# Patient Record
Sex: MALE | Race: WHITE | NOT HISPANIC OR LATINO | URBAN - METROPOLITAN AREA
[De-identification: names, ages, dates, MRNs, and addresses within clinical notes are randomized per-mention and may not be internally consistent; named-entity substitution may affect disease eponyms.]

---

## 2021-05-24 ENCOUNTER — HOSPITAL ENCOUNTER (EMERGENCY)
Facility: HOSPITAL | Age: 56
Discharge: HOME | End: 2021-05-25
Attending: STUDENT IN AN ORGANIZED HEALTH CARE EDUCATION/TRAINING PROGRAM
Payer: MEDICARE

## 2021-05-24 VITALS
RESPIRATION RATE: 20 BRPM | DIASTOLIC BLOOD PRESSURE: 60 MMHG | TEMPERATURE: 98 F | WEIGHT: 151.81 LBS | OXYGEN SATURATION: 97 % | HEART RATE: 98 BPM | SYSTOLIC BLOOD PRESSURE: 122 MMHG

## 2021-05-24 DIAGNOSIS — L03.032 CELLULITIS OF TOE OF LEFT FOOT: ICD-10-CM

## 2021-05-24 DIAGNOSIS — T74.21XA SEXUAL ASSAULT OF ADULT, INITIAL ENCOUNTER: Primary | ICD-10-CM

## 2021-05-24 LAB
BASOPHILS # BLD AUTO: 0.04 K/UL (ref 0–0.2)
BASOPHILS NFR BLD: 1.1 % (ref 0–1.9)
DIFFERENTIAL METHOD: ABNORMAL
EOSINOPHIL # BLD AUTO: 0.2 K/UL (ref 0–0.5)
EOSINOPHIL NFR BLD: 5 % (ref 0–8)
ERYTHROCYTE [DISTWIDTH] IN BLOOD BY AUTOMATED COUNT: 13.1 % (ref 11.5–14.5)
HCT VFR BLD AUTO: 42.8 % (ref 40–54)
HEP C VIRUS HOLD SPECIMEN: NORMAL
HGB BLD-MCNC: 14 G/DL (ref 14–18)
IMM GRANULOCYTES # BLD AUTO: 0.01 K/UL (ref 0–0.04)
IMM GRANULOCYTES NFR BLD AUTO: 0.3 % (ref 0–0.5)
LYMPHOCYTES # BLD AUTO: 1 K/UL (ref 1–4.8)
LYMPHOCYTES NFR BLD: 27.6 % (ref 18–48)
MCH RBC QN AUTO: 30.8 PG (ref 27–31)
MCHC RBC AUTO-ENTMCNC: 32.7 G/DL (ref 32–36)
MCV RBC AUTO: 94 FL (ref 82–98)
MONOCYTES # BLD AUTO: 0.5 K/UL (ref 0.3–1)
MONOCYTES NFR BLD: 13.5 % (ref 4–15)
NEUTROPHILS # BLD AUTO: 1.9 K/UL (ref 1.8–7.7)
NEUTROPHILS NFR BLD: 52.5 % (ref 38–73)
NRBC BLD-RTO: 0 /100 WBC
PLATELET # BLD AUTO: 197 K/UL (ref 150–450)
PMV BLD AUTO: 10.1 FL (ref 9.2–12.9)
RBC # BLD AUTO: 4.54 M/UL (ref 4.6–6.2)
WBC # BLD AUTO: 3.62 K/UL (ref 3.9–12.7)

## 2021-05-24 PROCEDURE — 63600175 PHARM REV CODE 636 W HCPCS: Performed by: STUDENT IN AN ORGANIZED HEALTH CARE EDUCATION/TRAINING PROGRAM

## 2021-05-24 PROCEDURE — 36415 COLL VENOUS BLD VENIPUNCTURE: CPT | Performed by: EMERGENCY MEDICINE

## 2021-05-24 PROCEDURE — 99284 EMERGENCY DEPT VISIT MOD MDM: CPT | Mod: 25

## 2021-05-24 PROCEDURE — 85025 COMPLETE CBC W/AUTO DIFF WBC: CPT | Performed by: STUDENT IN AN ORGANIZED HEALTH CARE EDUCATION/TRAINING PROGRAM

## 2021-05-24 PROCEDURE — 85651 RBC SED RATE NONAUTOMATED: CPT | Performed by: STUDENT IN AN ORGANIZED HEALTH CARE EDUCATION/TRAINING PROGRAM

## 2021-05-24 PROCEDURE — 96374 THER/PROPH/DIAG INJ IV PUSH: CPT

## 2021-05-24 RX ORDER — KETOROLAC TROMETHAMINE 30 MG/ML
15 INJECTION, SOLUTION INTRAMUSCULAR; INTRAVENOUS
Status: COMPLETED | OUTPATIENT
Start: 2021-05-24 | End: 2021-05-24

## 2021-05-24 RX ADMIN — KETOROLAC TROMETHAMINE 15 MG: 30 INJECTION, SOLUTION INTRAMUSCULAR; INTRAVENOUS at 11:05

## 2021-05-25 LAB
ALBUMIN SERPL BCP-MCNC: 3.6 G/DL (ref 3.5–5.2)
ALP SERPL-CCNC: 84 U/L (ref 55–135)
ALT SERPL W/O P-5'-P-CCNC: 20 U/L (ref 10–44)
ANION GAP SERPL CALC-SCNC: 9 MMOL/L (ref 8–16)
AST SERPL-CCNC: 21 U/L (ref 10–40)
BILIRUB SERPL-MCNC: 0.3 MG/DL (ref 0.1–1)
BUN SERPL-MCNC: 25 MG/DL (ref 6–20)
CALCIUM SERPL-MCNC: 8.5 MG/DL (ref 8.7–10.5)
CHLORIDE SERPL-SCNC: 104 MMOL/L (ref 95–110)
CO2 SERPL-SCNC: 27 MMOL/L (ref 23–29)
CREAT SERPL-MCNC: 0.9 MG/DL (ref 0.5–1.4)
CRP SERPL-MCNC: 1.6 MG/L (ref 0–8.2)
ERYTHROCYTE [SEDIMENTATION RATE] IN BLOOD BY WESTERGREN METHOD: 0 MM/HR (ref 0–10)
EST. GFR  (AFRICAN AMERICAN): >60 ML/MIN/1.73 M^2
EST. GFR  (NON AFRICAN AMERICAN): >60 ML/MIN/1.73 M^2
GLUCOSE SERPL-MCNC: 100 MG/DL (ref 70–110)
HCV AB SERPL QL IA: NEGATIVE
HEP C VIRUS HOLD SPECIMEN: NORMAL
HIV 1+2 AB+HIV1 P24 AG SERPL QL IA: NEGATIVE
POTASSIUM SERPL-SCNC: 4 MMOL/L (ref 3.5–5.1)
PROT SERPL-MCNC: 6.8 G/DL (ref 6–8.4)
SODIUM SERPL-SCNC: 140 MMOL/L (ref 136–145)

## 2021-05-25 PROCEDURE — 86140 C-REACTIVE PROTEIN: CPT | Performed by: STUDENT IN AN ORGANIZED HEALTH CARE EDUCATION/TRAINING PROGRAM

## 2021-05-25 PROCEDURE — 36415 COLL VENOUS BLD VENIPUNCTURE: CPT | Performed by: STUDENT IN AN ORGANIZED HEALTH CARE EDUCATION/TRAINING PROGRAM

## 2021-05-25 PROCEDURE — 25000003 PHARM REV CODE 250: Performed by: STUDENT IN AN ORGANIZED HEALTH CARE EDUCATION/TRAINING PROGRAM

## 2021-05-25 PROCEDURE — 86803 HEPATITIS C AB TEST: CPT | Performed by: STUDENT IN AN ORGANIZED HEALTH CARE EDUCATION/TRAINING PROGRAM

## 2021-05-25 PROCEDURE — 80053 COMPREHEN METABOLIC PANEL: CPT | Performed by: STUDENT IN AN ORGANIZED HEALTH CARE EDUCATION/TRAINING PROGRAM

## 2021-05-25 PROCEDURE — 86703 HIV-1/HIV-2 1 RESULT ANTBDY: CPT | Performed by: STUDENT IN AN ORGANIZED HEALTH CARE EDUCATION/TRAINING PROGRAM

## 2021-05-25 RX ORDER — SULFAMETHOXAZOLE AND TRIMETHOPRIM 800; 160 MG/1; MG/1
1 TABLET ORAL 2 TIMES DAILY
Qty: 14 TABLET | Refills: 0 | Status: SHIPPED | OUTPATIENT
Start: 2021-05-25 | End: 2021-06-01

## 2021-05-25 RX ORDER — SULFAMETHOXAZOLE AND TRIMETHOPRIM 800; 160 MG/1; MG/1
1 TABLET ORAL
Status: COMPLETED | OUTPATIENT
Start: 2021-05-25 | End: 2021-05-25

## 2021-05-25 RX ADMIN — SULFAMETHOXAZOLE AND TRIMETHOPRIM 1 TABLET: 800; 160 TABLET ORAL at 01:05

## 2022-08-16 ENCOUNTER — OFFICE VISIT (OUTPATIENT)
Dept: PODIATRY | Facility: CLINIC | Age: 57
End: 2022-08-16
Payer: MEDICARE

## 2022-08-16 DIAGNOSIS — F79 INTELLECTUAL DISABILITY: Primary | ICD-10-CM

## 2022-08-16 DIAGNOSIS — E11.69 TYPE 2 DIABETES MELLITUS WITH OTHER SPECIFIED COMPLICATION, WITHOUT LONG-TERM CURRENT USE OF INSULIN: ICD-10-CM

## 2022-08-16 DIAGNOSIS — Z89.431 HISTORY OF TRANSMETATARSAL AMPUTATION OF RIGHT FOOT: ICD-10-CM

## 2022-08-16 DIAGNOSIS — Z89.412 HISTORY OF AMPUTATION OF LEFT GREAT TOE: ICD-10-CM

## 2022-08-16 DIAGNOSIS — Z89.422 HISTORY OF AMPUTATION OF LESSER TOE OF LEFT FOOT: ICD-10-CM

## 2022-08-16 PROCEDURE — 99999 PR PBB SHADOW E&M-EST. PATIENT-LVL II: CPT | Mod: PBBFAC,,, | Performed by: PODIATRIST

## 2022-08-16 PROCEDURE — 99203 OFFICE O/P NEW LOW 30 MIN: CPT | Mod: S$PBB,,, | Performed by: PODIATRIST

## 2022-08-16 PROCEDURE — 99203 PR OFFICE/OUTPT VISIT, NEW, LEVL III, 30-44 MIN: ICD-10-PCS | Mod: S$PBB,,, | Performed by: PODIATRIST

## 2022-08-16 PROCEDURE — 99999 PR PBB SHADOW E&M-EST. PATIENT-LVL II: ICD-10-PCS | Mod: PBBFAC,,, | Performed by: PODIATRIST

## 2022-08-16 PROCEDURE — 99212 OFFICE O/P EST SF 10 MIN: CPT | Mod: PBBFAC | Performed by: PODIATRIST

## 2022-08-16 RX ORDER — POTASSIUM CHLORIDE 20 MEQ/1
20 TABLET, EXTENDED RELEASE ORAL 2 TIMES DAILY
COMMUNITY
Start: 2022-07-18

## 2022-08-16 RX ORDER — FUROSEMIDE 20 MG/1
20 TABLET ORAL 2 TIMES DAILY
COMMUNITY
Start: 2022-07-18

## 2022-08-16 RX ORDER — APIXABAN 5 MG/1
5 TABLET, FILM COATED ORAL 2 TIMES DAILY
COMMUNITY
Start: 2022-07-18

## 2022-08-16 RX ORDER — ATORVASTATIN CALCIUM 10 MG/1
10 TABLET, FILM COATED ORAL DAILY
COMMUNITY
Start: 2022-07-18

## 2022-08-16 RX ORDER — TAMSULOSIN HYDROCHLORIDE 0.4 MG/1
1 CAPSULE ORAL DAILY
COMMUNITY
Start: 2022-07-18

## 2022-08-16 RX ORDER — DIVALPROEX SODIUM 500 MG/1
1000 TABLET, FILM COATED, EXTENDED RELEASE ORAL NIGHTLY
COMMUNITY
Start: 2022-07-18

## 2022-08-16 RX ORDER — AMLODIPINE BESYLATE 5 MG/1
5 TABLET ORAL DAILY
COMMUNITY
Start: 2022-07-18

## 2022-08-16 RX ORDER — OXCARBAZEPINE 600 MG/1
600 TABLET, FILM COATED ORAL 2 TIMES DAILY
COMMUNITY
Start: 2022-07-18

## 2022-08-16 RX ORDER — QUETIAPINE FUMARATE 400 MG/1
400 TABLET, FILM COATED ORAL NIGHTLY
COMMUNITY
Start: 2022-07-18

## 2022-08-16 RX ORDER — CYPROHEPTADINE HYDROCHLORIDE 4 MG/1
4 TABLET ORAL 2 TIMES DAILY
COMMUNITY
Start: 2022-07-18

## 2022-08-16 RX ORDER — DIVALPROEX SODIUM 250 MG/1
250 TABLET, FILM COATED, EXTENDED RELEASE ORAL DAILY
COMMUNITY
Start: 2022-07-18

## 2022-08-16 RX ORDER — GABAPENTIN 300 MG/1
300 CAPSULE ORAL 3 TIMES DAILY
COMMUNITY
Start: 2022-07-18

## 2022-08-16 NOTE — PROGRESS NOTES
Subjective:       Patient ID: Juan Francisco Parker is a 57 y.o. male.    Chief Complaint: Diabetic Foot Exam (Patient is a diabetic and was last seen on 2.10.22 by DANIS Davis. He denies pain at present and is wearing socks and closed toe shoes .)      HPI: Patient presents to the office today with his caregiver present.  Patient does have a history of type 2 diabetes mellitus and does follow with Raad Davis NP at his group home with last appointment on 02/10/2022.  Patient does have history of right transmetatarsal amputation and left 1 and to digital amputation.  He does wear custom diabetic shoe gear with insert.  Last pair of shoes was purchase in December per the patient's care provider who is present.  He reports 0/10 pain.    Hemoglobin A1C   Date Value Ref Range Status   12/10/2021 5.1 <=6.5 % Final   .     Review of patient's allergies indicates:   Allergen Reactions    Cephalexin        Past Medical History:   Diagnosis Date    Diabetes mellitus, type 2     Hyperlipidemia     Hypertension     Peripheral neuropathy        No family history on file.    Social History     Socioeconomic History    Marital status: Single       No past surgical history on file.    Review of Systems       Objective:   There were no vitals taken for this visit.    Physical Exam  LOWER EXTREMITY PHYSICAL EXAMINATION    VASCULAR:  The right dorsalis pedis pulse 2/4 and the right posterior tibial pulse 1/4.  The left dorsalis pedis pulse 2/4 and posterior tibial pulse on the left is 1/4.  Capillary refill is intact.  Pedal hair growth decreased.     NEUROLOGY:  Unable to accurately assess patient's neurological status given history of intellectual disability.    DERMATOLOGY:  No signs infection, acute infection or edema.  There is no open wounds.  No signs of callusing.  No pre ulcerative lesions.  No discoloration.    ORTHOPEDIC:  History of right transmetatarsal amputation.  History of left great toe and 2nd toe  amputation.  Ambulating in wheelchair    Assessment:     1. Intellectual disability    2. Type 2 diabetes mellitus with other specified complication, without long-term current use of insulin    3. History of transmetatarsal amputation of right foot    4. History of amputation of left great toe    5. History of amputation of second toe of left foot        Plan:     Intellectual disability    Type 2 diabetes mellitus with other specified complication, without long-term current use of insulin    History of transmetatarsal amputation of right foot    History of amputation of left great toe    History of amputation of second toe of left foot        Thorough discussion is had with the patient's caregiver this afternoon, concerning the diagnosis, its etiology, and the treatment algorithm at present.  Greater than 50% of this visit spent on counseling and coordination of care. Greater than 15 minutes of a 20 minute appointment spent on education about the diabetic foot, neuropathy, and prevention of limb loss.  Shoe inspection. Diabetic Foot Education. Patient reminded of the importance of good nutrition and blood sugar control to help prevent podiatric complications of diabetes. Patient instructed on proper foot hygeine. We discussed wearing proper and supportive shoe gear, daily foot inspections, never walking barefooted or sock footed, never putting sharp instruments to feet which can cause major complications associated with infection, ulcers, lacerations.      Future Appointments   Date Time Provider Department Center   [facility]/2022 10:30 [facility]   [facility]22 10:30 [facility]

## 2022-08-25 ENCOUNTER — OFFICE VISIT (OUTPATIENT)
Dept: INTERNAL MEDICINE | Facility: CLINIC | Age: 57
End: 2022-08-25
Payer: MEDICARE

## 2022-08-25 VITALS
HEIGHT: 73 IN | HEART RATE: 94 BPM | OXYGEN SATURATION: 97 % | SYSTOLIC BLOOD PRESSURE: 118 MMHG | BODY MASS INDEX: 20.67 KG/M2 | DIASTOLIC BLOOD PRESSURE: 72 MMHG | WEIGHT: 156 LBS | RESPIRATION RATE: 18 BRPM

## 2022-08-25 DIAGNOSIS — Z02.89 ENCOUNTER FOR COMPLETION OF FORM WITH PATIENT: Primary | ICD-10-CM

## 2022-08-25 DIAGNOSIS — S98.132A AMPUTATED TOE OF LEFT FOOT: ICD-10-CM

## 2022-08-25 DIAGNOSIS — N40.0 BENIGN PROSTATIC HYPERPLASIA, UNSPECIFIED WHETHER LOWER URINARY TRACT SYMPTOMS PRESENT: ICD-10-CM

## 2022-08-25 DIAGNOSIS — F63.9 IMPULSE CONTROL DISORDER: ICD-10-CM

## 2022-08-25 DIAGNOSIS — I10 PRIMARY HYPERTENSION: ICD-10-CM

## 2022-08-25 DIAGNOSIS — S98.131A AMPUTATED TOE, RIGHT: ICD-10-CM

## 2022-08-25 DIAGNOSIS — E11.69 CONTROLLED TYPE 2 DIABETES MELLITUS WITH OTHER SPECIFIED COMPLICATION, WITHOUT LONG-TERM CURRENT USE OF INSULIN: ICD-10-CM

## 2022-08-25 DIAGNOSIS — F79 INTELLECTUAL DISABILITY: ICD-10-CM

## 2022-08-25 DIAGNOSIS — E78.5 HYPERLIPIDEMIA, UNSPECIFIED HYPERLIPIDEMIA TYPE: ICD-10-CM

## 2022-08-25 PROBLEM — E11.9 CONTROLLED TYPE 2 DIABETES MELLITUS, WITHOUT LONG-TERM CURRENT USE OF INSULIN: Status: ACTIVE | Noted: 2022-08-25

## 2022-08-25 PROCEDURE — 99999 PR PBB SHADOW E&M-EST. PATIENT-LVL III: ICD-10-PCS | Mod: PBBFAC,,, | Performed by: FAMILY MEDICINE

## 2022-08-25 PROCEDURE — 99204 PR OFFICE/OUTPT VISIT, NEW, LEVL IV, 45-59 MIN: ICD-10-PCS | Mod: S$PBB,,, | Performed by: FAMILY MEDICINE

## 2022-08-25 PROCEDURE — 99204 OFFICE O/P NEW MOD 45 MIN: CPT | Mod: S$PBB,,, | Performed by: FAMILY MEDICINE

## 2022-08-25 PROCEDURE — 99213 OFFICE O/P EST LOW 20 MIN: CPT | Mod: PBBFAC | Performed by: FAMILY MEDICINE

## 2022-08-25 PROCEDURE — 99999 PR PBB SHADOW E&M-EST. PATIENT-LVL III: CPT | Mod: PBBFAC,,, | Performed by: FAMILY MEDICINE

## 2022-08-25 NOTE — PROGRESS NOTES
Subjective:       Patient ID: Juan Francisco Parker is a 57 y.o. male.    Chief Complaint: Annual Exam and Establish Care    He is here for completion of 90 L form.  He is in a group.  Primary care physician is in [place].  He is also followed by Neurology and Psychiatry.  He has diagnoses of hypertension diabetes anxiety depression impulse control disorder in like trial disability.  As of diabetes he has had amputation of toes of both feet.  Recent glucose was 110.  Blood pressure 118/72.    Review of Systems   Constitutional: Negative for chills and fever.   HENT: Negative for congestion.    Respiratory: Negative for cough, chest tightness and shortness of breath.    Cardiovascular: Negative for chest pain, palpitations and leg swelling.   Gastrointestinal: Negative for abdominal distention and abdominal pain.   Genitourinary: Negative for difficulty urinating.   Neurological: Negative for dizziness, weakness, light-headedness and headaches.   Psychiatric/Behavioral: Positive for agitation and confusion.       Objective:      Physical Exam  Constitutional:       General: He is not in acute distress.     Appearance: He is not ill-appearing or diaphoretic.   Cardiovascular:      Rate and Rhythm: Normal rate and regular rhythm.      Heart sounds: No murmur heard.    No gallop.   Pulmonary:      Effort: Pulmonary effort is normal. No respiratory distress.      Breath sounds: No wheezing, rhonchi or rales.   Abdominal:      General: There is no distension.   Lymphadenopathy:      Cervical: No cervical adenopathy.   Skin:     General: Skin is warm and dry.      Coloration: Skin is not pale.      Findings: No erythema.   Psychiatric:         Mood and Affect: Mood normal.         Behavior: Behavior normal.         Admission on 05/24/2021, Discharged on 05/25/2021   Component Date Value Ref Range Status    WBC 05/24/2021 3.62 (A) 3.90 - 12.70 K/uL Final    RBC 05/24/2021 4.54 (A) 4.60 - 6.20 M/uL Final    Hemoglobin 05/24/2021  14.0  14.0 - 18.0 g/dL Final    Hematocrit 05/24/2021 42.8  40.0 - 54.0 % Final    MCV 05/24/2021 94  82 - 98 fL Final    MCH 05/24/2021 30.8  27.0 - 31.0 pg Final    MCHC 05/24/2021 32.7  32.0 - 36.0 g/dL Final    RDW 05/24/2021 13.1  11.5 - 14.5 % Final    Platelets 05/24/2021 197  150 - 450 K/uL Final    MPV 05/24/2021 10.1  9.2 - 12.9 fL Final    Immature Granulocytes 05/24/2021 0.3  0.0 - 0.5 % Final    Gran # (ANC) 05/24/2021 1.9  1.8 - 7.7 K/uL Final    Immature Grans (Abs) 05/24/2021 0.01  0.00 - 0.04 K/uL Final    Lymph # 05/24/2021 1.0  1.0 - 4.8 K/uL Final    Mono # 05/24/2021 0.5  0.3 - 1.0 K/uL Final    Eos # 05/24/2021 0.2  0.0 - 0.5 K/uL Final    Baso # 05/24/2021 0.04  0.00 - 0.20 K/uL Final    nRBC 05/24/2021 0  0 /100 WBC Final    Gran % 05/24/2021 52.5  38.0 - 73.0 % Final    Lymph % 05/24/2021 27.6  18.0 - 48.0 % Final    Mono % 05/24/2021 13.5  4.0 - 15.0 % Final    Eosinophil % 05/24/2021 5.0  0.0 - 8.0 % Final    Basophil % 05/24/2021 1.1  0.0 - 1.9 % Final    Differential Method 05/24/2021 Automated   Final    Sed Rate 05/24/2021 0  0 - 10 mm/Hr Final    HEP C Virus Hold Specimen 05/24/2021 Hold for HCV sendout   Final    Sodium 05/25/2021 140  136 - 145 mmol/L Final    Potassium 05/25/2021 4.0  3.5 - 5.1 mmol/L Final    Chloride 05/25/2021 104  95 - 110 mmol/L Final    CO2 05/25/2021 27  23 - 29 mmol/L Final    Glucose 05/25/2021 100  70 - 110 mg/dL Final    BUN 05/25/2021 25 (A) 6 - 20 mg/dL Final    Creatinine 05/25/2021 0.9  0.5 - 1.4 mg/dL Final    Calcium 05/25/2021 8.5 (A) 8.7 - 10.5 mg/dL Final    Total Protein 05/25/2021 6.8  6.0 - 8.4 g/dL Final    Albumin 05/25/2021 3.6  3.5 - 5.2 g/dL Final    Total Bilirubin 05/25/2021 0.3  0.1 - 1.0 mg/dL Final    Alkaline Phosphatase 05/25/2021 84  55 - 135 U/L Final    AST 05/25/2021 21  10 - 40 U/L Final    ALT 05/25/2021 20  10 - 44 U/L Final    Anion Gap 05/25/2021 9  8 - 16 mmol/L Final    eGFR if   05/25/2021 >60  >60 mL/min/1.73 m^2 Final    eGFR if non African American 05/25/2021 >60  >60 mL/min/1.73 m^2 Final    CRP 05/25/2021 1.6  0.0 - 8.2 mg/L Final    HIV 1/2 Ag/Ab 05/25/2021 Negative  Negative Final    Hepatitis C Ab 05/25/2021 Negative  Negative Final    HEP C Virus Hold Specimen 05/25/2021 Hold for HCV sendout   Final     Assessment:       1. Encounter for completion of form with patient    2. Controlled type 2 diabetes mellitus with other specified complication, without long-term current use of insulin    3. Primary hypertension    4. Impulse control disorder    5. Intellectual disability    6. Amputated toe of left foot    7. Amputated toe, right    8. Benign prostatic hyperplasia, unspecified whether lower urinary tract symptoms present    9. Hyperlipidemia, unspecified hyperlipidemia type        Plan:   90 L form was completed.  He will continue follow-up with primary care physician his neurologist and a psychiatrist.      Encounter for completion of form with patient    Controlled type 2 diabetes mellitus with other specified complication, without long-term current use of insulin    Primary hypertension    Impulse control disorder    Intellectual disability    Amputated toe of left foot    Amputated toe, right    Benign prostatic hyperplasia, unspecified whether lower urinary tract symptoms present    Hyperlipidemia, unspecified hyperlipidemia type

## 2022-09-06 ENCOUNTER — OFFICE VISIT (OUTPATIENT)
Dept: OPHTHALMOLOGY | Facility: CLINIC | Age: 57
End: 2022-09-06
Payer: MEDICARE

## 2022-09-06 DIAGNOSIS — H52.7 REFRACTIVE ERRORS: ICD-10-CM

## 2022-09-06 DIAGNOSIS — E11.9 DIABETES MELLITUS TYPE 2 WITHOUT RETINOPATHY: Primary | ICD-10-CM

## 2022-09-06 DIAGNOSIS — H50.15 EXOTROPIA, ALTERNATING: ICD-10-CM

## 2022-09-06 PROCEDURE — 92004 COMPRE OPH EXAM NEW PT 1/>: CPT | Mod: S$PBB,,, | Performed by: OPTOMETRIST

## 2022-09-06 PROCEDURE — 99211 OFF/OP EST MAY X REQ PHY/QHP: CPT | Mod: PBBFAC | Performed by: OPTOMETRIST

## 2022-09-06 PROCEDURE — 99999 PR PBB SHADOW E&M-EST. PATIENT-LVL I: ICD-10-PCS | Mod: PBBFAC,,, | Performed by: OPTOMETRIST

## 2022-09-06 PROCEDURE — 92004 PR EYE EXAM, NEW PATIENT,COMPREHESV: ICD-10-PCS | Mod: S$PBB,,, | Performed by: OPTOMETRIST

## 2022-09-06 PROCEDURE — 92015 DETERMINE REFRACTIVE STATE: CPT | Mod: ,,, | Performed by: OPTOMETRIST

## 2022-09-06 PROCEDURE — 99999 PR PBB SHADOW E&M-EST. PATIENT-LVL I: CPT | Mod: PBBFAC,,, | Performed by: OPTOMETRIST

## 2022-09-06 PROCEDURE — 92015 PR REFRACTION: ICD-10-PCS | Mod: ,,, | Performed by: OPTOMETRIST

## 2022-09-06 RX ORDER — ARGININE/GLUTAMINE/CALCIUM BMB 7G-7G-1.5G
POWDER IN PACKET (EA) ORAL DAILY
COMMUNITY

## 2022-09-06 RX ORDER — RISPERIDONE 0.5 MG/1
0.5 TABLET ORAL 2 TIMES DAILY
COMMUNITY
Start: 2022-08-31

## 2022-09-06 RX ORDER — LACTULOSE 10 G/10G
10 SOLUTION ORAL 3 TIMES DAILY
COMMUNITY

## 2022-09-06 RX ORDER — BISACODYL 5 MG
5 TABLET, DELAYED RELEASE (ENTERIC COATED) ORAL DAILY PRN
COMMUNITY

## 2022-09-06 NOTE — PROGRESS NOTES
HPI    NIDDM exam  No visual complaints  New patient last eye exam 06/17/2021  Update glasses   RX.Lab Results       Component                Value               Date                       HGBA1C                   5.1                 12/10/2021              Last edited by Glenys Villela MA on 9/6/2022 10:50 AM.            Assessment /Plan     For exam results, see Encounter Report.    Diabetes mellitus type 2 without retinopathy    Exotropia, alternating    Refractive errors    No Background Diabetic Retinopathy    Dispense Final Rx for glasses.  RTC 1 year  Discussed above and answered questions.

## 2022-12-06 ENCOUNTER — TELEPHONE (OUTPATIENT)
Dept: PODIATRY | Facility: CLINIC | Age: 57
End: 2022-12-06
Payer: MEDICARE

## 2023-10-24 ENCOUNTER — HOSPITAL ENCOUNTER (INPATIENT)
Facility: HOSPITAL | Age: 58
LOS: 12 days | Discharge: NURSING, REHAB OR LONG-TERM CARE | DRG: 871 | End: 2023-11-05
Attending: EMERGENCY MEDICINE | Admitting: HOSPITALIST
Payer: MEDICARE

## 2023-10-24 DIAGNOSIS — N39.0 RECURRENT UTI: ICD-10-CM

## 2023-10-24 DIAGNOSIS — T68.XXXA HYPOTHERMIA, INITIAL ENCOUNTER: Primary | ICD-10-CM

## 2023-10-24 DIAGNOSIS — F79 INTELLECTUAL DISABILITY: ICD-10-CM

## 2023-10-24 DIAGNOSIS — A41.9 SEPSIS: ICD-10-CM

## 2023-10-24 DIAGNOSIS — F99 MENTAL DISORDER: ICD-10-CM

## 2023-10-24 LAB
ALBUMIN SERPL BCP-MCNC: 3 G/DL (ref 3.5–5.2)
ALP SERPL-CCNC: 112 U/L (ref 55–135)
ALT SERPL W/O P-5'-P-CCNC: 37 U/L (ref 10–44)
ANION GAP SERPL CALC-SCNC: 10 MMOL/L (ref 8–16)
AST SERPL-CCNC: 40 U/L (ref 10–40)
BACTERIA #/AREA URNS HPF: ABNORMAL /HPF
BASOPHILS # BLD AUTO: 0.01 K/UL (ref 0–0.2)
BASOPHILS NFR BLD: 0.3 % (ref 0–1.9)
BILIRUB SERPL-MCNC: 0.4 MG/DL (ref 0.1–1)
BILIRUB UR QL STRIP: NEGATIVE
BUN SERPL-MCNC: 10 MG/DL (ref 6–20)
CALCIUM SERPL-MCNC: 8.5 MG/DL (ref 8.7–10.5)
CHLORIDE SERPL-SCNC: 98 MMOL/L (ref 95–110)
CLARITY UR: ABNORMAL
CO2 SERPL-SCNC: 28 MMOL/L (ref 23–29)
COLOR UR: YELLOW
CREAT SERPL-MCNC: 0.6 MG/DL (ref 0.5–1.4)
DIFFERENTIAL METHOD: ABNORMAL
EOSINOPHIL # BLD AUTO: 0 K/UL (ref 0–0.5)
EOSINOPHIL NFR BLD: 0.3 % (ref 0–8)
ERYTHROCYTE [DISTWIDTH] IN BLOOD BY AUTOMATED COUNT: 14.4 % (ref 11.5–14.5)
EST. GFR  (NO RACE VARIABLE): >60 ML/MIN/1.73 M^2
GLUCOSE SERPL-MCNC: 75 MG/DL (ref 70–110)
GLUCOSE UR QL STRIP: NEGATIVE
HCT VFR BLD AUTO: 32.9 % (ref 40–54)
HGB BLD-MCNC: 11.2 G/DL (ref 14–18)
HGB UR QL STRIP: ABNORMAL
HYALINE CASTS #/AREA URNS LPF: 2 /LPF
IMM GRANULOCYTES # BLD AUTO: 0 K/UL (ref 0–0.04)
IMM GRANULOCYTES NFR BLD AUTO: 0 % (ref 0–0.5)
KETONES UR QL STRIP: NEGATIVE
LACTATE SERPL-SCNC: 0.6 MMOL/L (ref 0.5–2.2)
LEUKOCYTE ESTERASE UR QL STRIP: ABNORMAL
LIPASE SERPL-CCNC: 13 U/L (ref 4–60)
LYMPHOCYTES # BLD AUTO: 0.3 K/UL (ref 1–4.8)
LYMPHOCYTES NFR BLD: 9.4 % (ref 18–48)
MCH RBC QN AUTO: 32.4 PG (ref 27–31)
MCHC RBC AUTO-ENTMCNC: 34 G/DL (ref 32–36)
MCV RBC AUTO: 95 FL (ref 82–98)
MICROSCOPIC COMMENT: ABNORMAL
MONOCYTES # BLD AUTO: 0.4 K/UL (ref 0.3–1)
MONOCYTES NFR BLD: 12.4 % (ref 4–15)
NEUTROPHILS # BLD AUTO: 2.4 K/UL (ref 1.8–7.7)
NEUTROPHILS NFR BLD: 77.6 % (ref 38–73)
NITRITE UR QL STRIP: POSITIVE
NRBC BLD-RTO: 0 /100 WBC
PH UR STRIP: 8 [PH] (ref 5–8)
PLATELET # BLD AUTO: 76 K/UL (ref 150–450)
PMV BLD AUTO: 10.1 FL (ref 9.2–12.9)
POCT GLUCOSE: 73 MG/DL (ref 70–110)
POTASSIUM SERPL-SCNC: 4.8 MMOL/L (ref 3.5–5.1)
PROCALCITONIN SERPL IA-MCNC: 0.14 NG/ML
PROT SERPL-MCNC: 6.5 G/DL (ref 6–8.4)
PROT UR QL STRIP: ABNORMAL
RBC # BLD AUTO: 3.46 M/UL (ref 4.6–6.2)
RBC #/AREA URNS HPF: >100 /HPF (ref 0–4)
SODIUM SERPL-SCNC: 136 MMOL/L (ref 136–145)
SP GR UR STRIP: 1.01 (ref 1–1.03)
URN SPEC COLLECT METH UR: ABNORMAL
UROBILINOGEN UR STRIP-ACNC: NEGATIVE EU/DL
VALPROATE SERPL-MCNC: 65.8 UG/ML (ref 50–100)
WBC # BLD AUTO: 3.07 K/UL (ref 3.9–12.7)
WBC #/AREA URNS HPF: >100 /HPF (ref 0–5)
WBC CLUMPS URNS QL MICRO: ABNORMAL

## 2023-10-24 PROCEDURE — 96361 HYDRATE IV INFUSION ADD-ON: CPT

## 2023-10-24 PROCEDURE — 25000003 PHARM REV CODE 250: Performed by: HOSPITALIST

## 2023-10-24 PROCEDURE — 81000 URINALYSIS NONAUTO W/SCOPE: CPT | Performed by: EMERGENCY MEDICINE

## 2023-10-24 PROCEDURE — 85025 COMPLETE CBC W/AUTO DIFF WBC: CPT | Performed by: EMERGENCY MEDICINE

## 2023-10-24 PROCEDURE — 83605 ASSAY OF LACTIC ACID: CPT | Performed by: EMERGENCY MEDICINE

## 2023-10-24 PROCEDURE — 25000003 PHARM REV CODE 250: Performed by: EMERGENCY MEDICINE

## 2023-10-24 PROCEDURE — 80053 COMPREHEN METABOLIC PANEL: CPT | Performed by: EMERGENCY MEDICINE

## 2023-10-24 PROCEDURE — 87086 URINE CULTURE/COLONY COUNT: CPT | Performed by: EMERGENCY MEDICINE

## 2023-10-24 PROCEDURE — 63600175 PHARM REV CODE 636 W HCPCS: Performed by: EMERGENCY MEDICINE

## 2023-10-24 PROCEDURE — 96365 THER/PROPH/DIAG IV INF INIT: CPT

## 2023-10-24 PROCEDURE — 84145 PROCALCITONIN (PCT): CPT | Performed by: EMERGENCY MEDICINE

## 2023-10-24 PROCEDURE — 87186 SC STD MICRODIL/AGAR DIL: CPT | Performed by: EMERGENCY MEDICINE

## 2023-10-24 PROCEDURE — 80164 ASSAY DIPROPYLACETIC ACD TOT: CPT | Performed by: EMERGENCY MEDICINE

## 2023-10-24 PROCEDURE — 82962 GLUCOSE BLOOD TEST: CPT

## 2023-10-24 PROCEDURE — 87040 BLOOD CULTURE FOR BACTERIA: CPT | Performed by: EMERGENCY MEDICINE

## 2023-10-24 PROCEDURE — 99291 CRITICAL CARE FIRST HOUR: CPT

## 2023-10-24 PROCEDURE — 63600175 PHARM REV CODE 636 W HCPCS: Performed by: HOSPITALIST

## 2023-10-24 PROCEDURE — 87088 URINE BACTERIA CULTURE: CPT | Performed by: EMERGENCY MEDICINE

## 2023-10-24 PROCEDURE — 83690 ASSAY OF LIPASE: CPT | Performed by: EMERGENCY MEDICINE

## 2023-10-24 PROCEDURE — 11000001 HC ACUTE MED/SURG PRIVATE ROOM

## 2023-10-24 PROCEDURE — 87077 CULTURE AEROBIC IDENTIFY: CPT | Performed by: EMERGENCY MEDICINE

## 2023-10-24 RX ORDER — ATORVASTATIN CALCIUM 10 MG/1
10 TABLET, FILM COATED ORAL DAILY
Status: DISCONTINUED | OUTPATIENT
Start: 2023-10-25 | End: 2023-11-05 | Stop reason: HOSPADM

## 2023-10-24 RX ORDER — FLUOXETINE HYDROCHLORIDE 20 MG/1
40 CAPSULE ORAL DAILY
Status: DISCONTINUED | OUTPATIENT
Start: 2023-10-25 | End: 2023-11-05 | Stop reason: HOSPADM

## 2023-10-24 RX ORDER — OXCARBAZEPINE 150 MG/1
600 TABLET, FILM COATED ORAL 2 TIMES DAILY
Status: DISCONTINUED | OUTPATIENT
Start: 2023-10-24 | End: 2023-11-05 | Stop reason: HOSPADM

## 2023-10-24 RX ORDER — ACETAMINOPHEN 325 MG/1
650 TABLET ORAL EVERY 4 HOURS PRN
Status: DISCONTINUED | OUTPATIENT
Start: 2023-10-24 | End: 2023-11-05 | Stop reason: HOSPADM

## 2023-10-24 RX ORDER — ONDANSETRON 2 MG/ML
4 INJECTION INTRAMUSCULAR; INTRAVENOUS EVERY 8 HOURS PRN
Status: DISCONTINUED | OUTPATIENT
Start: 2023-10-24 | End: 2023-11-05 | Stop reason: HOSPADM

## 2023-10-24 RX ORDER — PROMETHAZINE HYDROCHLORIDE 12.5 MG/1
25 TABLET ORAL EVERY 6 HOURS PRN
Status: DISCONTINUED | OUTPATIENT
Start: 2023-10-24 | End: 2023-11-05 | Stop reason: HOSPADM

## 2023-10-24 RX ORDER — IPRATROPIUM BROMIDE AND ALBUTEROL SULFATE 2.5; .5 MG/3ML; MG/3ML
3 SOLUTION RESPIRATORY (INHALATION) EVERY 6 HOURS PRN
Status: DISCONTINUED | OUTPATIENT
Start: 2023-10-24 | End: 2023-11-05 | Stop reason: HOSPADM

## 2023-10-24 RX ORDER — GABAPENTIN 300 MG/1
300 CAPSULE ORAL 3 TIMES DAILY
Status: DISCONTINUED | OUTPATIENT
Start: 2023-10-25 | End: 2023-11-05 | Stop reason: HOSPADM

## 2023-10-24 RX ORDER — DIVALPROEX SODIUM 500 MG/1
1000 TABLET, FILM COATED, EXTENDED RELEASE ORAL NIGHTLY
Status: DISCONTINUED | OUTPATIENT
Start: 2023-10-24 | End: 2023-11-05 | Stop reason: HOSPADM

## 2023-10-24 RX ORDER — DIVALPROEX SODIUM 250 MG/1
250 TABLET, FILM COATED, EXTENDED RELEASE ORAL DAILY
Status: DISCONTINUED | OUTPATIENT
Start: 2023-10-25 | End: 2023-11-05 | Stop reason: HOSPADM

## 2023-10-24 RX ORDER — GLUCAGON 1 MG
1 KIT INJECTION
Status: DISCONTINUED | OUTPATIENT
Start: 2023-10-24 | End: 2023-11-05 | Stop reason: HOSPADM

## 2023-10-24 RX ORDER — CEFDINIR 300 MG/1
300 CAPSULE ORAL 2 TIMES DAILY
Qty: 14 CAPSULE | Refills: 0 | Status: SHIPPED | OUTPATIENT
Start: 2023-10-24 | End: 2023-11-05 | Stop reason: HOSPADM

## 2023-10-24 RX ORDER — BISACODYL 5 MG
5 TABLET, DELAYED RELEASE (ENTERIC COATED) ORAL DAILY PRN
Status: DISCONTINUED | OUTPATIENT
Start: 2023-10-24 | End: 2023-11-05 | Stop reason: HOSPADM

## 2023-10-24 RX ORDER — TAMSULOSIN HYDROCHLORIDE 0.4 MG/1
1 CAPSULE ORAL DAILY
Status: DISCONTINUED | OUTPATIENT
Start: 2023-10-25 | End: 2023-11-05 | Stop reason: HOSPADM

## 2023-10-24 RX ORDER — POTASSIUM CHLORIDE 20 MEQ/1
20 TABLET, EXTENDED RELEASE ORAL 2 TIMES DAILY
Status: DISCONTINUED | OUTPATIENT
Start: 2023-10-24 | End: 2023-11-05 | Stop reason: HOSPADM

## 2023-10-24 RX ORDER — IBUPROFEN 200 MG
24 TABLET ORAL
Status: DISCONTINUED | OUTPATIENT
Start: 2023-10-24 | End: 2023-11-05 | Stop reason: HOSPADM

## 2023-10-24 RX ORDER — QUETIAPINE FUMARATE 100 MG/1
400 TABLET, FILM COATED ORAL NIGHTLY
Status: DISCONTINUED | OUTPATIENT
Start: 2023-10-24 | End: 2023-11-05 | Stop reason: HOSPADM

## 2023-10-24 RX ORDER — ALBUTEROL SULFATE 0.83 MG/ML
2.5 SOLUTION RESPIRATORY (INHALATION) EVERY 4 HOURS PRN
Status: DISCONTINUED | OUTPATIENT
Start: 2023-10-24 | End: 2023-10-24 | Stop reason: SDUPTHER

## 2023-10-24 RX ORDER — LACTULOSE 10 G/15ML
10 SOLUTION ORAL 3 TIMES DAILY PRN
Status: DISCONTINUED | OUTPATIENT
Start: 2023-10-24 | End: 2023-11-05 | Stop reason: HOSPADM

## 2023-10-24 RX ORDER — SODIUM CHLORIDE, SODIUM LACTATE, POTASSIUM CHLORIDE, CALCIUM CHLORIDE 600; 310; 30; 20 MG/100ML; MG/100ML; MG/100ML; MG/100ML
INJECTION, SOLUTION INTRAVENOUS CONTINUOUS
Status: DISCONTINUED | OUTPATIENT
Start: 2023-10-24 | End: 2023-10-26

## 2023-10-24 RX ORDER — IBUPROFEN 200 MG
16 TABLET ORAL
Status: DISCONTINUED | OUTPATIENT
Start: 2023-10-24 | End: 2023-11-05 | Stop reason: HOSPADM

## 2023-10-24 RX ORDER — ENOXAPARIN SODIUM 100 MG/ML
40 INJECTION SUBCUTANEOUS EVERY 24 HOURS
Status: DISCONTINUED | OUTPATIENT
Start: 2023-10-24 | End: 2023-10-24

## 2023-10-24 RX ORDER — ACETAMINOPHEN 325 MG/1
650 TABLET ORAL EVERY 8 HOURS PRN
Status: DISCONTINUED | OUTPATIENT
Start: 2023-10-24 | End: 2023-11-05 | Stop reason: HOSPADM

## 2023-10-24 RX ORDER — POLYETHYLENE GLYCOL 3350 17 G/17G
17 POWDER, FOR SOLUTION ORAL DAILY PRN
Status: DISCONTINUED | OUTPATIENT
Start: 2023-10-24 | End: 2023-11-05 | Stop reason: HOSPADM

## 2023-10-24 RX ORDER — RISPERIDONE 0.5 MG/1
0.5 TABLET ORAL 2 TIMES DAILY
Status: DISCONTINUED | OUTPATIENT
Start: 2023-10-24 | End: 2023-11-05 | Stop reason: HOSPADM

## 2023-10-24 RX ADMIN — SODIUM CHLORIDE 2000 ML: 9 INJECTION, SOLUTION INTRAVENOUS at 06:10

## 2023-10-24 RX ADMIN — RISPERIDONE 0.5 MG: 0.5 TABLET ORAL at 11:10

## 2023-10-24 RX ADMIN — CEFTRIAXONE SODIUM 1 G: 1 INJECTION, POWDER, FOR SOLUTION INTRAMUSCULAR; INTRAVENOUS at 04:10

## 2023-10-24 RX ADMIN — OXCARBAZEPINE 600 MG: 150 TABLET, FILM COATED ORAL at 11:10

## 2023-10-24 RX ADMIN — SODIUM CHLORIDE, POTASSIUM CHLORIDE, SODIUM LACTATE AND CALCIUM CHLORIDE: 600; 310; 30; 20 INJECTION, SOLUTION INTRAVENOUS at 10:10

## 2023-10-24 RX ADMIN — DIVALPROEX SODIUM 1000 MG: 500 TABLET, FILM COATED, EXTENDED RELEASE ORAL at 11:10

## 2023-10-24 RX ADMIN — QUETIAPINE FUMARATE 400 MG: 100 TABLET ORAL at 11:10

## 2023-10-24 RX ADMIN — POTASSIUM CHLORIDE 20 MEQ: 1500 TABLET, EXTENDED RELEASE ORAL at 11:10

## 2023-10-24 NOTE — ED PROVIDER NOTES
SCRIBE #1 NOTE: I, Percy Ghosh, am scribing for, and in the presence of, Paulette Cook MD. I have scribed the entire note.      History      Chief Complaint   Patient presents with    Altered Mental Status     AMS coming from group home, per ems pt has been AMS since this morning and has frequent UTI's. Unable to obtain temp orally or axillary       Review of patient's allergies indicates:   Allergen Reactions    Cephalexin         HPI   HPI    10/24/2023, 1:46 PM   History obtained from EMS  HPI and ROS limited, as pt is nonverbal at baseline      History of Present Illness: Juan Francisco Parker is a 58 y.o. male patient with a PMHx of HTN, mental disability, DM2 who presents to the Emergency Department for AMS, onset this morning. EMS reports that the pt was sent to the ED form his group home after he was noted to be more altered than his baseline. Pt has a PMHx of recurrent UTIs. Symptoms are constant and moderate in severity. No mitigating or exacerbating factors reported. No associated sxs reported. No prior Tx reported. No further complaints or concerns at this time.     Arrival mode: EMS    PCP: No, Primary Doctor       Past Medical History:  Past Medical History:   Diagnosis Date    Diabetes mellitus, type 2 2004    BS didn't check 09/06/2022    Hyperlipidemia     Hypertension     Mental disability     Paranoid personality disorder     Peripheral neuropathy     Unspecified intellectual disabilities        Past Surgical History:  History reviewed. No pertinent surgical history.      Family History:  History reviewed. No pertinent family history.    Social History:  Social History     Tobacco Use    Smoking status: Former    Smokeless tobacco: Never   Substance and Sexual Activity    Alcohol use: Not on file    Drug use: Not on file    Sexual activity: Not on file       ROS   Review of Systems   Unable to perform ROS: Patient nonverbal   Psychiatric/Behavioral:  Positive for confusion (AMS).      Physical Exam       Initial Vitals   BP Pulse Resp Temp SpO2   10/24/23 1316 10/24/23 1316 10/24/23 1316 10/24/23 1419 10/24/23 1316   106/68 (!) 55 18 (!) 87.4 °F (30.8 °C) 96 %      MAP       --                 Physical Exam  Nursing Notes and Vital Signs Reviewed.  Constitutional: Patient is in no acute distress. Smells of foul urine.  Head: Atraumatic. Normocephalic.  Eyes: PERRL. EOM intact. Conjunctivae are not pale. No scleral icterus.  ENT: Mucous membranes are moist. Oropharynx is clear and symmetric.    Neck: Supple. Full ROM.  Cardiovascular: Regular rate. Regular rhythm. No murmurs, rubs, or gallops. Distal pulses are 2+ and symmetric.  Pulmonary/Chest: No respiratory distress. Clear to auscultation bilaterally. No wheezing or rales.  Abdominal: Soft and non-distended.  There is no tenderness.  No rebound, guarding, or rigidity.   Musculoskeletal: Moves all extremities. No obvious deformities. No edema.  Skin: Warm and dry.  Neurological: Awake and alert. Does not appear disoriented. Able to follow simple commands. Nonverbal at baseline.    ED Course    Critical Care    Date/Time: 10/24/2023 6:47 PM    Performed by: Paulette Cook MD  Authorized by: Paulette Cook MD  Direct patient critical care time: 15 minutes  Additional history critical care time: 5 minutes  Ordering / reviewing critical care time: 10 minutes  Documentation critical care time: 5 minutes  Consulting other physicians critical care time: 5 minutes  Consult with family critical care time: 5 minutes  Total critical care time (exclusive of procedural time) : 45 minutes  Critical care time was exclusive of separately billable procedures and treating other patients and teaching time.  Critical care was necessary to treat or prevent imminent or life-threatening deterioration of the following conditions: sepsis (Hypothermia).  Critical care was time spent personally by me on the following activities: blood draw for specimens, development of treatment  "plan with patient or surrogate, discussions with consultants, interpretation of cardiac output measurements, evaluation of patient's response to treatment, examination of patient, obtaining history from patient or surrogate, ordering and performing treatments and interventions, ordering and review of laboratory studies, pulse oximetry, re-evaluation of patient's condition and review of old charts.        ED Vital Signs:  Vitals:    10/24/23 1930 10/24/23 2036 10/25/23 0012 10/25/23 0305   BP: (!) 104/57 102/71 109/60    Pulse: 76 82 91 97   Resp: 17 18 18    Temp: (!) 92.5 °F (33.6 °C) (!) 94.5 °F (34.7 °C) (!) 94.5 °F (34.7 °C)    TempSrc: Rectal Rectal Axillary    SpO2: 95% (!) 94% (!) 92%    Weight:       Height:        10/25/23 0449 10/25/23 0753 10/25/23 1029 10/25/23 1115   BP: 99/63 (!) 103/55     Pulse: 91 92  84   Resp: 18 15     Temp: 97.6 °F (36.4 °C) (!) 95.8 °F (35.4 °C) 96 °F (35.6 °C)    TempSrc: Axillary Rectal Rectal    SpO2: (!) 92% (!) 92%     Weight: 75.3 kg (166 lb 0.1 oz)      Height:        10/25/23 1151 10/25/23 1341 10/25/23 1500 10/25/23 1531   BP: (!) 105/58   (!) 101/51   Pulse: 90 86 84 94   Resp: 18   18   Temp: 97.6 °F (36.4 °C)   98.7 °F (37.1 °C)   TempSrc: Oral   Oral   SpO2: (!) 93%   (!) 91%   Weight:       Height:        10/25/23 1652 10/25/23 1700 10/25/23 1940   BP:   131/61   Pulse:  72 99   Resp:   18   Temp: 98.7 °F (37.1 °C)  98.1 °F (36.7 °C)   TempSrc:      SpO2:   (!) 91%   Weight: 75.3 kg (166 lb 0.1 oz)     Height: 6' 1" (1.854 m)         Abnormal Lab Results:  Labs Reviewed   URINALYSIS, REFLEX TO URINE CULTURE - Abnormal; Notable for the following components:       Result Value    Appearance, UA Hazy (*)     Protein, UA 1+ (*)     Occult Blood UA 2+ (*)     Nitrite, UA Positive (*)     Leukocytes, UA 3+ (*)     All other components within normal limits    Narrative:     Specimen Source->Urine   CBC W/ AUTO DIFFERENTIAL - Abnormal; Notable for the following " components:    WBC 3.07 (*)     RBC 3.46 (*)     Hemoglobin 11.2 (*)     Hematocrit 32.9 (*)     MCH 32.4 (*)     Platelets 76 (*)     Lymph # 0.3 (*)     Gran % 77.6 (*)     Lymph % 9.4 (*)     All other components within normal limits   COMPREHENSIVE METABOLIC PANEL - Abnormal; Notable for the following components:    Calcium 8.5 (*)     Albumin 3.0 (*)     All other components within normal limits   URINALYSIS MICROSCOPIC - Abnormal; Notable for the following components:    RBC, UA >100 (*)     WBC, UA >100 (*)     WBC Clumps, UA Many (*)     Bacteria Moderate (*)     Hyaline Casts, UA 2 (*)     All other components within normal limits    Narrative:     Specimen Source->Urine   VALPROIC ACID   LIPASE   PROCALCITONIN   LACTIC ACID, PLASMA   POCT GLUCOSE   POCT GLUCOSE MONITORING CONTINUOUS   POCT GLUCOSE MONITORING CONTINUOUS        All Lab Results:  Results for orders placed or performed during the hospital encounter of 10/24/23   Urine culture    Specimen: Urine   Result Value Ref Range    Urine Culture, Routine (A)      GRAM NEGATIVE RAMA  50,000 - 99,999 cfu/ml  Identification and susceptibility pending     Blood Culture #1 **CANNOT BE ORDERED STAT**    Specimen: Peripheral, Antecubital, Left; Blood   Result Value Ref Range    Blood Culture, Routine No Growth to date    Blood Culture #2 **CANNOT BE ORDERED STAT**    Specimen: Peripheral, Antecubital, Right; Blood   Result Value Ref Range    Blood Culture, Routine No Growth to date    Urinalysis, Reflex to Urine Culture Urine, Catheterized    Specimen: Urine   Result Value Ref Range    Specimen UA Urine, Catheterized     Color, UA Yellow Yellow, Straw, Elizabet    Appearance, UA Hazy (A) Clear    pH, UA 8.0 5.0 - 8.0    Specific Gravity, UA 1.015 1.005 - 1.030    Protein, UA 1+ (A) Negative    Glucose, UA Negative Negative    Ketones, UA Negative Negative    Bilirubin (UA) Negative Negative    Occult Blood UA 2+ (A) Negative    Nitrite, UA Positive (A) Negative     Urobilinogen, UA Negative <2.0 EU/dL    Leukocytes, UA 3+ (A) Negative   Valproic Acid   Result Value Ref Range    Valproic Acid Level 65.8 50.0 - 100.0 ug/mL   CBC auto differential   Result Value Ref Range    WBC 3.07 (L) 3.90 - 12.70 K/uL    RBC 3.46 (L) 4.60 - 6.20 M/uL    Hemoglobin 11.2 (L) 14.0 - 18.0 g/dL    Hematocrit 32.9 (L) 40.0 - 54.0 %    MCV 95 82 - 98 fL    MCH 32.4 (H) 27.0 - 31.0 pg    MCHC 34.0 32.0 - 36.0 g/dL    RDW 14.4 11.5 - 14.5 %    Platelets 76 (L) 150 - 450 K/uL    MPV 10.1 9.2 - 12.9 fL    Immature Granulocytes 0.0 0.0 - 0.5 %    Gran # (ANC) 2.4 1.8 - 7.7 K/uL    Immature Grans (Abs) 0.00 0.00 - 0.04 K/uL    Lymph # 0.3 (L) 1.0 - 4.8 K/uL    Mono # 0.4 0.3 - 1.0 K/uL    Eos # 0.0 0.0 - 0.5 K/uL    Baso # 0.01 0.00 - 0.20 K/uL    nRBC 0 0 /100 WBC    Gran % 77.6 (H) 38.0 - 73.0 %    Lymph % 9.4 (L) 18.0 - 48.0 %    Mono % 12.4 4.0 - 15.0 %    Eosinophil % 0.3 0.0 - 8.0 %    Basophil % 0.3 0.0 - 1.9 %    Differential Method Automated    Comprehensive metabolic panel   Result Value Ref Range    Sodium 136 136 - 145 mmol/L    Potassium 4.8 3.5 - 5.1 mmol/L    Chloride 98 95 - 110 mmol/L    CO2 28 23 - 29 mmol/L    Glucose 75 70 - 110 mg/dL    BUN 10 6 - 20 mg/dL    Creatinine 0.6 0.5 - 1.4 mg/dL    Calcium 8.5 (L) 8.7 - 10.5 mg/dL    Total Protein 6.5 6.0 - 8.4 g/dL    Albumin 3.0 (L) 3.5 - 5.2 g/dL    Total Bilirubin 0.4 0.1 - 1.0 mg/dL    Alkaline Phosphatase 112 55 - 135 U/L    AST 40 10 - 40 U/L    ALT 37 10 - 44 U/L    eGFR >60 >60 mL/min/1.73 m^2    Anion Gap 10 8 - 16 mmol/L   Lipase   Result Value Ref Range    Lipase 13 4 - 60 U/L   Urinalysis Microscopic   Result Value Ref Range    RBC, UA >100 (H) 0 - 4 /hpf    WBC, UA >100 (H) 0 - 5 /hpf    WBC Clumps, UA Many (A) None-Rare    Bacteria Moderate (A) None-Occ /hpf    Hyaline Casts, UA 2 (A) 0-1/lpf /lpf    Microscopic Comment SEE COMMENT    Procalcitonin   Result Value Ref Range    Procalcitonin 0.14 <0.25 ng/mL   Lactic acid,  plasma   Result Value Ref Range    Lactate (Lactic Acid) 0.6 0.5 - 2.2 mmol/L   CBC auto differential (daily)   Result Value Ref Range    WBC 4.91 3.90 - 12.70 K/uL    RBC 3.47 (L) 4.60 - 6.20 M/uL    Hemoglobin 11.2 (L) 14.0 - 18.0 g/dL    Hematocrit 32.6 (L) 40.0 - 54.0 %    MCV 94 82 - 98 fL    MCH 32.3 (H) 27.0 - 31.0 pg    MCHC 34.4 32.0 - 36.0 g/dL    RDW 14.7 (H) 11.5 - 14.5 %    Platelets 90 (L) 150 - 450 K/uL    MPV 10.4 9.2 - 12.9 fL    Immature Granulocytes 0.4 0.0 - 0.5 %    Gran # (ANC) 4.3 1.8 - 7.7 K/uL    Immature Grans (Abs) 0.02 0.00 - 0.04 K/uL    Lymph # 0.2 (L) 1.0 - 4.8 K/uL    Mono # 0.4 0.3 - 1.0 K/uL    Eos # 0.0 0.0 - 0.5 K/uL    Baso # 0.03 0.00 - 0.20 K/uL    nRBC 0 0 /100 WBC    Gran % 88.2 (H) 38.0 - 73.0 %    Lymph % 3.1 (L) 18.0 - 48.0 %    Mono % 7.5 4.0 - 15.0 %    Eosinophil % 0.2 0.0 - 8.0 %    Basophil % 0.6 0.0 - 1.9 %    Platelet Estimate Decreased (A)     Aniso Slight     Ovalocytes Occasional     Large/Giant Platelets Present     Differential Method Automated    Comprehensive metabolic panel (daily)   Result Value Ref Range    Sodium 138 136 - 145 mmol/L    Potassium 4.0 3.5 - 5.1 mmol/L    Chloride 98 95 - 110 mmol/L    CO2 28 23 - 29 mmol/L    Glucose 57 (L) 70 - 110 mg/dL    BUN 9 6 - 20 mg/dL    Creatinine 0.6 0.5 - 1.4 mg/dL    Calcium 8.6 (L) 8.7 - 10.5 mg/dL    Total Protein 6.1 6.0 - 8.4 g/dL    Albumin 2.8 (L) 3.5 - 5.2 g/dL    Total Bilirubin 0.4 0.1 - 1.0 mg/dL    Alkaline Phosphatase 109 55 - 135 U/L    AST 31 10 - 40 U/L    ALT 33 10 - 44 U/L    eGFR >60 >60 mL/min/1.73 m^2    Anion Gap 12 8 - 16 mmol/L   Magnesium - Daily   Result Value Ref Range    Magnesium 1.5 (L) 1.6 - 2.6 mg/dL   Phosphorus -Daily   Result Value Ref Range    Phosphorus 4.2 2.7 - 4.5 mg/dL   POCT glucose   Result Value Ref Range    POCT Glucose 73 70 - 110 mg/dL     Imaging Results:  Imaging Results    None                 The Emergency Provider reviewed the vital signs and test results,  which are outlined above.    ED Discussion     6:42 PM: Discussed case with Dr. Moura ([facility]). Dr. Sommers agrees with current care and management of pt and accepts admission.   Admitting Service: Hospital Medicine  Admitting Physician: Dr. Sommers  Admit to: Inpatient Tele    6:43 PM: Re-evaluated pt. I have discussed test results, shared treatment plan, and the need for admission with patient's sitter at bedside. Sitter expresses understanding at this time and agrees with all information. All questions answered. Sitter has no further questions or concerns at this time. Pt is ready for admit.         ED Medication(s):  Medications   glucose chewable tablet 16 g (has no administration in time range)   glucose chewable tablet 24 g (has no administration in time range)   glucagon (human recombinant) injection 1 mg (has no administration in time range)   cefTRIAXone (ROCEPHIN) 1 g in dextrose 5 % in water (D5W) 100 mL IVPB (MB+) (0 g Intravenous Stopped 10/25/23 6117)   acetaminophen tablet 650 mg (has no administration in time range)   polyethylene glycol packet 17 g (has no administration in time range)   ondansetron injection 4 mg (has no administration in time range)   promethazine tablet 25 mg (has no administration in time range)   albuterol-ipratropium 2.5 mg-0.5 mg/3 mL nebulizer solution 3 mL (has no administration in time range)   acetaminophen tablet 650 mg (has no administration in time range)   dextrose 10% bolus 125 mL 125 mL (has no administration in time range)   dextrose 10% bolus 250 mL 250 mL (has no administration in time range)   lactated ringers infusion ( Intravenous New Bag 10/24/23 0621)   atorvastatin tablet 10 mg (10 mg Oral Given 10/25/23 0947)   bisacodyL EC tablet 5 mg (has no administration in time range)   divalproex 24 hr tablet 1,000 mg (1,000 mg Oral Given 10/24/23 5144)   divalproex ER 24 hr tablet 250 mg (250 mg Oral Given 10/25/23 0951)   gabapentin capsule 300 mg (300  mg Oral Given 10/25/23 1437)   lactulose 20 gram/30 mL solution Soln 10 g (has no administration in time range)   OXcarbazepine tablet 600 mg (600 mg Oral Given 10/25/23 0947)   potassium chloride SA CR tablet 20 mEq (20 mEq Oral Given 10/25/23 0947)   QUEtiapine tablet 400 mg (400 mg Oral Given 10/24/23 2343)   risperiDONE tablet 0.5 mg (0.5 mg Oral Given 10/25/23 0947)   tamsulosin 24 hr capsule 0.4 mg (0.4 mg Oral Given 10/25/23 0947)   FLUoxetine capsule 40 mg (40 mg Oral Given 10/25/23 0947)   apixaban tablet 5 mg (5 mg Oral Given 10/25/23 0947)   cefTRIAXone (ROCEPHIN) 1 g in dextrose 5 % in water (D5W) 100 mL IVPB (MB+) (0 g Intravenous Stopped 10/24/23 1649)   sodium chloride 0.9% bolus 2,000 mL 2,000 mL (0 mLs Intravenous Stopped 10/24/23 1940)   magnesium sulfate 2g in water 50mL IVPB (premix) (0 g Intravenous Stopped 10/25/23 1158)        Follow-up Information       [facility]. Schedule an appointment as soon as possible for a visit in 2 days.    Specialty: Urology  Why: Return to the Emergency Room, If symptoms worsen  Contact information:  [address] [facility] [address] [phone]                         Current Discharge Medication List        START taking these medications    Details   cefdinir (OMNICEF) 300 MG capsule Take 1 capsule (300 mg total) by mouth 2 (two) times daily. for 7 days  Qty: 14 capsule, Refills: 0               Medical Decision Making    Medical Decision Making  Dehydration  UTI  Electrolyte dysfunction    59 y/o male hx of severe intellectual disability, personality disorder, recurrent UTI, coming from NH, sent to the ER for confusion, foul odor noted to urine.  Exam quite limited but patient not in any distress, urine very malodorous, and noted to have low rectal temp which was repeat and now patient on bare hugger.  Lab work otherwise normal except for significant UTI noted, iv antibiotics and iv fluids initiated, will need admission to hospital  medicine, consulted for admission.     Amount and/or Complexity of Data Reviewed  Independent Historian: guardian and caregiver     Details: Group home due to patient being nonverbal with severe intellectual delay.   Labs: ordered. Decision-making details documented in ED Course.    Risk  Prescription drug management.  Decision regarding hospitalization.                  Scribe Attestation:   Scribe #1: I performed the above scribed service and the documentation accurately describes the services I performed. I attest to the accuracy of the note.    Attending:   Physician Attestation Statement for Scribe #1: I, Paulette Cook MD, personally performed the services described in this documentation, as scribed by Percy Ghosh, in my presence, and it is both accurate and complete.          Clinical Impression       ICD-10-CM ICD-9-CM   1. Hypothermia, initial encounter  T68.XXXA 991.6   2. Recurrent UTI  N39.0 599.0   3. Mental disorder  F99 300.9   4. Intellectual disability  F79 319       Disposition:   Disposition: Admitted  Condition: Fair         Paulette Cook MD  10/25/23 2032

## 2023-10-25 PROBLEM — F41.9 ANXIETY AND DEPRESSION: Status: ACTIVE | Noted: 2023-10-25

## 2023-10-25 PROBLEM — G62.9 NEUROPATHY: Status: ACTIVE | Noted: 2023-10-25

## 2023-10-25 PROBLEM — Z86.718 HISTORY OF DVT (DEEP VEIN THROMBOSIS): Status: ACTIVE | Noted: 2023-10-25

## 2023-10-25 PROBLEM — F32.A ANXIETY AND DEPRESSION: Status: ACTIVE | Noted: 2023-10-25

## 2023-10-25 LAB
ALBUMIN SERPL BCP-MCNC: 2.8 G/DL (ref 3.5–5.2)
ALP SERPL-CCNC: 109 U/L (ref 55–135)
ALT SERPL W/O P-5'-P-CCNC: 33 U/L (ref 10–44)
ANION GAP SERPL CALC-SCNC: 12 MMOL/L (ref 8–16)
ANISOCYTOSIS BLD QL SMEAR: SLIGHT
AST SERPL-CCNC: 31 U/L (ref 10–40)
BASOPHILS # BLD AUTO: 0.03 K/UL (ref 0–0.2)
BASOPHILS NFR BLD: 0.6 % (ref 0–1.9)
BILIRUB SERPL-MCNC: 0.4 MG/DL (ref 0.1–1)
BUN SERPL-MCNC: 9 MG/DL (ref 6–20)
CALCIUM SERPL-MCNC: 8.6 MG/DL (ref 8.7–10.5)
CHLORIDE SERPL-SCNC: 98 MMOL/L (ref 95–110)
CO2 SERPL-SCNC: 28 MMOL/L (ref 23–29)
CREAT SERPL-MCNC: 0.6 MG/DL (ref 0.5–1.4)
DIFFERENTIAL METHOD: ABNORMAL
EOSINOPHIL # BLD AUTO: 0 K/UL (ref 0–0.5)
EOSINOPHIL NFR BLD: 0.2 % (ref 0–8)
ERYTHROCYTE [DISTWIDTH] IN BLOOD BY AUTOMATED COUNT: 14.7 % (ref 11.5–14.5)
EST. GFR  (NO RACE VARIABLE): >60 ML/MIN/1.73 M^2
GIANT PLATELETS BLD QL SMEAR: PRESENT
GLUCOSE SERPL-MCNC: 57 MG/DL (ref 70–110)
HCT VFR BLD AUTO: 32.6 % (ref 40–54)
HGB BLD-MCNC: 11.2 G/DL (ref 14–18)
IMM GRANULOCYTES # BLD AUTO: 0.02 K/UL (ref 0–0.04)
IMM GRANULOCYTES NFR BLD AUTO: 0.4 % (ref 0–0.5)
LYMPHOCYTES # BLD AUTO: 0.2 K/UL (ref 1–4.8)
LYMPHOCYTES NFR BLD: 3.1 % (ref 18–48)
MAGNESIUM SERPL-MCNC: 1.5 MG/DL (ref 1.6–2.6)
MCH RBC QN AUTO: 32.3 PG (ref 27–31)
MCHC RBC AUTO-ENTMCNC: 34.4 G/DL (ref 32–36)
MCV RBC AUTO: 94 FL (ref 82–98)
MONOCYTES # BLD AUTO: 0.4 K/UL (ref 0.3–1)
MONOCYTES NFR BLD: 7.5 % (ref 4–15)
NEUTROPHILS # BLD AUTO: 4.3 K/UL (ref 1.8–7.7)
NEUTROPHILS NFR BLD: 88.2 % (ref 38–73)
NRBC BLD-RTO: 0 /100 WBC
OVALOCYTES BLD QL SMEAR: ABNORMAL
PHOSPHATE SERPL-MCNC: 4.2 MG/DL (ref 2.7–4.5)
PLATELET # BLD AUTO: 90 K/UL (ref 150–450)
PLATELET BLD QL SMEAR: ABNORMAL
PMV BLD AUTO: 10.4 FL (ref 9.2–12.9)
POTASSIUM SERPL-SCNC: 4 MMOL/L (ref 3.5–5.1)
PROT SERPL-MCNC: 6.1 G/DL (ref 6–8.4)
RBC # BLD AUTO: 3.47 M/UL (ref 4.6–6.2)
SODIUM SERPL-SCNC: 138 MMOL/L (ref 136–145)
WBC # BLD AUTO: 4.91 K/UL (ref 3.9–12.7)

## 2023-10-25 PROCEDURE — 63600175 PHARM REV CODE 636 W HCPCS: Performed by: HOSPITALIST

## 2023-10-25 PROCEDURE — 36415 COLL VENOUS BLD VENIPUNCTURE: CPT | Performed by: HOSPITALIST

## 2023-10-25 PROCEDURE — 84100 ASSAY OF PHOSPHORUS: CPT | Performed by: HOSPITALIST

## 2023-10-25 PROCEDURE — 11000001 HC ACUTE MED/SURG PRIVATE ROOM

## 2023-10-25 PROCEDURE — 25000003 PHARM REV CODE 250: Performed by: HOSPITALIST

## 2023-10-25 PROCEDURE — 80053 COMPREHEN METABOLIC PANEL: CPT | Performed by: HOSPITALIST

## 2023-10-25 PROCEDURE — 63600175 PHARM REV CODE 636 W HCPCS: Performed by: FAMILY MEDICINE

## 2023-10-25 PROCEDURE — 85025 COMPLETE CBC W/AUTO DIFF WBC: CPT | Performed by: HOSPITALIST

## 2023-10-25 PROCEDURE — 83735 ASSAY OF MAGNESIUM: CPT | Performed by: HOSPITALIST

## 2023-10-25 RX ORDER — MAGNESIUM SULFATE HEPTAHYDRATE 40 MG/ML
2 INJECTION, SOLUTION INTRAVENOUS ONCE
Status: COMPLETED | OUTPATIENT
Start: 2023-10-25 | End: 2023-10-25

## 2023-10-25 RX ADMIN — APIXABAN 5 MG: 2.5 TABLET, FILM COATED ORAL at 09:10

## 2023-10-25 RX ADMIN — OXCARBAZEPINE 600 MG: 150 TABLET, FILM COATED ORAL at 09:10

## 2023-10-25 RX ADMIN — ATORVASTATIN CALCIUM 10 MG: 10 TABLET, FILM COATED ORAL at 09:10

## 2023-10-25 RX ADMIN — GABAPENTIN 300 MG: 300 CAPSULE ORAL at 09:10

## 2023-10-25 RX ADMIN — RISPERIDONE 0.5 MG: 0.5 TABLET ORAL at 09:10

## 2023-10-25 RX ADMIN — POTASSIUM CHLORIDE 20 MEQ: 1500 TABLET, EXTENDED RELEASE ORAL at 09:10

## 2023-10-25 RX ADMIN — TAMSULOSIN HYDROCHLORIDE 0.4 MG: 0.4 CAPSULE ORAL at 09:10

## 2023-10-25 RX ADMIN — CEFTRIAXONE 1 G: 1 INJECTION, POWDER, FOR SOLUTION INTRAMUSCULAR; INTRAVENOUS at 02:10

## 2023-10-25 RX ADMIN — DIVALPROEX SODIUM 250 MG: 500 TABLET, FILM COATED, EXTENDED RELEASE ORAL at 09:10

## 2023-10-25 RX ADMIN — QUETIAPINE FUMARATE 400 MG: 100 TABLET ORAL at 09:10

## 2023-10-25 RX ADMIN — DIVALPROEX SODIUM 1000 MG: 500 TABLET, FILM COATED, EXTENDED RELEASE ORAL at 09:10

## 2023-10-25 RX ADMIN — GABAPENTIN 300 MG: 300 CAPSULE ORAL at 02:10

## 2023-10-25 RX ADMIN — MAGNESIUM SULFATE HEPTAHYDRATE 2 G: 40 INJECTION, SOLUTION INTRAVENOUS at 09:10

## 2023-10-25 RX ADMIN — FLUOXETINE 40 MG: 20 CAPSULE ORAL at 09:10

## 2023-10-25 NOTE — ASSESSMENT & PLAN NOTE
Currently normotensive. BP currently ranging from  systolic.   Plan:  -Optimize pain control   -Hold home medications in setting of sepsis, titrate as needed   -Monitor BP  -IV hydralazine prn for SBP>160 or DBP>90

## 2023-10-25 NOTE — CONSULTS
Patient is from a group home. Employee from group home in room with patient and also on phone with manager of the group homes. Patient skin assessed. Consulted for multiple wounds. Patient has multiple scabs to back, BUE, BLE. Pressure injury noted to the right hip. Wedge in room, waffle mattress in place, turn Q 2 hours, heel boots in place. Foam dressing to all areas. Patient also has purple, maroon area to right posterior shoulder and left buttocks. Bruise vs DTI cant be ruled out at this time.    10/25/23 0750   WOCN Assessment   WOCN Total Time (mins) 90   Visit Date 10/25/23   Visit Time 0750   Consult Type New   WOCN Speciality Wound   Wound pressure;skin tear   Intervention assessed;applied   Teaching on-going        Altered Skin Integrity 10/24/23 2200 Right anterior Hip #1 Other (comment)   Date First Assessed/Time First Assessed: 10/24/23 2200   Altered Skin Integrity Present on Admission - Did Patient arrive to the hospital with altered skin?: yes  Side: Right  Orientation: anterior  Location: Hip  Wound Number: #1  Is this injury tomer...   Wound Image    Description of Altered Skin Integrity Full thickness tissue loss. Subcutaneous fat may be visible but bone, tendon or muscle are not exposed   Dressing Appearance Clean;Intact;Dry   Drainage Amount None   Drainage Characteristics/Odor No odor   Appearance Pink;Red   Tissue loss description Full thickness   Wound Edges Open   Wound Length (cm) 1.75 cm   Wound Width (cm) 1.75 cm   Wound Depth (cm) 0.3 cm   Wound Volume (cm^3) 0.86250 cm^3   Wound Surface Area (cm^2) 3.0625 cm^2   Care Cleansed with:;Wound cleanser   Dressing Applied;Foam   Periwound Care Skin barrier film applied        Altered Skin Integrity 10/24/23 2300 Left Buttocks #2 Skin Tear Partial thickness tissue loss. Shallow open ulcer with a red or pink wound bed, without slough. Intact or Open/Ruptured Serum-filled blister.   Date First Assessed/Time First Assessed: 10/24/23 2300   Altered  Skin Integrity Present on Admission - Did Patient arrive to the hospital with altered skin?: yes  Side: Left  Location: Buttocks  Wound Number: #2  Is this injury device related?: No  Pr...   Wound Image    Dressing Appearance Dry;Intact;Clean   Drainage Amount None   Appearance Pink;Red   Tissue loss description Partial thickness   Periwound Area Ecchymotic   Wound Edges Open   Wound Length (cm) 1 cm   Wound Width (cm) 2.5 cm   Wound Depth (cm) 0.1 cm   Wound Volume (cm^3) 0.25 cm^3   Wound Surface Area (cm^2) 2.5 cm^2   Care Cleansed with:;Wound cleanser   Dressing Applied;Foam        Altered Skin Integrity 10/24/23 2300 Left Heel #3 Other (comment)   Date First Assessed/Time First Assessed: 10/24/23 2300   Altered Skin Integrity Present on Admission - Did Patient arrive to the hospital with altered skin?: yes  Side: Left  Location: Heel  Wound Number: #3  Is this injury device related?: No  Primar...   Wound Image    Description of Altered Skin Integrity Intact skin with non-blanchable redness of localized area   Dressing Appearance Dry;Intact;Clean   Drainage Amount None   Drainage Characteristics/Odor No odor   Appearance Red   Dressing Applied;Foam   Off Loading Other (see comments)  (heel boots)        Altered Skin Integrity 10/25/23 0800 Right upper Back Abrasion(s)   Date First Assessed/Time First Assessed: 10/25/23 0800   Altered Skin Integrity Present on Admission - Did Patient arrive to the hospital with altered skin?: yes  Side: Right  Orientation: upper  Location: Back  Primary Wound Type: Abrasion(s)   Wound Image    Dressing Appearance Open to air   Drainage Amount None   Appearance Pink;Red   Tissue loss description Partial thickness   Wound Length (cm) 3 cm   Wound Width (cm) 2 cm   Wound Depth (cm) 0.1 cm   Wound Volume (cm^3) 0.6 cm^3   Wound Surface Area (cm^2) 6 cm^2   Care Cleansed with:;Wound cleanser   Dressing Applied;Foam      10/25/23 0750   WOCN Assessment   WOCN Total Time (mins) 90    Visit Date 10/25/23   Visit Time 0750   Consult Type New   WOCN Speciality Wound   Wound pressure;skin tear   Intervention assessed;applied   Teaching on-going        Altered Skin Integrity 10/24/23 2200 Right anterior Hip #1 Other (comment)   Date First Assessed/Time First Assessed: 10/24/23 2200   Altered Skin Integrity Present on Admission - Did Patient arrive to the hospital with altered skin?: yes  Side: Right  Orientation: anterior  Location: Hip  Wound Number: #1  Is this injury tomer...   Wound Image    Description of Altered Skin Integrity Full thickness tissue loss. Subcutaneous fat may be visible but bone, tendon or muscle are not exposed   Dressing Appearance Clean;Intact;Dry   Drainage Amount None   Drainage Characteristics/Odor No odor   Appearance Pink;Red   Tissue loss description Full thickness   Wound Edges Open   Wound Length (cm) 1.75 cm   Wound Width (cm) 1.75 cm   Wound Depth (cm) 0.3 cm   Wound Volume (cm^3) 0.03140 cm^3   Wound Surface Area (cm^2) 3.0625 cm^2   Care Cleansed with:;Wound cleanser   Dressing Applied;Foam   Periwound Care Skin barrier film applied        Altered Skin Integrity 10/24/23 2300 Left Buttocks #2 Skin Tear Partial thickness tissue loss. Shallow open ulcer with a red or pink wound bed, without slough. Intact or Open/Ruptured Serum-filled blister.   Date First Assessed/Time First Assessed: 10/24/23 2300   Altered Skin Integrity Present on Admission - Did Patient arrive to the hospital with altered skin?: yes  Side: Left  Location: Buttocks  Wound Number: #2  Is this injury device related?: No  Pr...   Wound Image    Dressing Appearance Dry;Intact;Clean   Drainage Amount None   Appearance Pink;Red   Tissue loss description Partial thickness   Periwound Area Ecchymotic   Wound Edges Open   Wound Length (cm) 1 cm   Wound Width (cm) 2.5 cm   Wound Depth (cm) 0.1 cm   Wound Volume (cm^3) 0.25 cm^3   Wound Surface Area (cm^2) 2.5 cm^2   Care Cleansed with:;Wound cleanser    Dressing Applied;Foam        Altered Skin Integrity 10/24/23 2300 Left Heel #3 Other (comment)   Date First Assessed/Time First Assessed: 10/24/23 2300   Altered Skin Integrity Present on Admission - Did Patient arrive to the hospital with altered skin?: yes  Side: Left  Location: Heel  Wound Number: #3  Is this injury device related?: No  Primar...   Wound Image    Description of Altered Skin Integrity Intact skin with non-blanchable redness of localized area   Dressing Appearance Dry;Intact;Clean   Drainage Amount None   Drainage Characteristics/Odor No odor   Appearance Red   Dressing Applied;Foam   Off Loading Other (see comments)  (heel boots)        Altered Skin Integrity 10/25/23 0800 Right upper Back Abrasion(s)   Date First Assessed/Time First Assessed: 10/25/23 0800   Altered Skin Integrity Present on Admission - Did Patient arrive to the hospital with altered skin?: yes  Side: Right  Orientation: upper  Location: Back  Primary Wound Type: Abrasion(s)   Wound Image    Dressing Appearance Open to air   Drainage Amount None   Appearance Pink;Red   Tissue loss description Partial thickness   Wound Length (cm) 3 cm   Wound Width (cm) 2 cm   Wound Depth (cm) 0.1 cm   Wound Volume (cm^3) 0.6 cm^3   Wound Surface Area (cm^2) 6 cm^2   Care Cleansed with:;Wound cleanser   Dressing Applied;Foam

## 2023-10-25 NOTE — ASSESSMENT & PLAN NOTE
This patient does have evidence of infective focus  My overall impression is sepsis.  Source: Urinary Tract  Antibiotics given-   Antibiotics (72h ago, onward)    Start     Stop Route Frequency Ordered    10/25/23 1530  cefTRIAXone (ROCEPHIN) 1 g in dextrose 5 % in water (D5W) 100 mL IVPB (MB+)  ( Urinary Tract Infection (UTI))         10/30/23 1529 IV Every 24 hours (non-standard times) 10/24/23 1907        Latest lactate reviewed-  Recent Labs   Lab 10/24/23  1809   LACTATE 0.6     Organ dysfunction indicated by Encephalopathy    Fluid challenge Ideal Body Weight- The patient's ideal body weight is Ideal body weight: 79.9 kg (176 lb 2.4 oz) which will be used to calculate fluid bolus of 30 ml/kg for treatment of septic shock.      Post- resuscitation assessment No - Post resuscitation assessment not needed     Will Not start Pressors- Levophed for MAP of 65  Source control achieved by: Rocephin     - Vitals more stable this morning  - Will restart leela hugger if temp <98

## 2023-10-25 NOTE — PROGRESS NOTES
[facility] - Med Surg 3  Adult Nutrition  Progress Note    SUMMARY       Recommendations    1. Encourage po intake    2. Recommend commercial beverages: Boost, to help meet nutritional needs   3. Monitor labs and weights   4. Collaboration with medical providers    Goals: Patient to consume >75% of EEN/EPN prior to RD follow up.  Nutrition Goal Status: new  Communication of RD Recs: other (comment) (POC)    Assessment and Plan  Nutrition Problem  Increased nutrition needs     Related to (etiology):   Sepsis     Signs and Symptoms (as evidenced by):   Decreased po intake   Higher and modified nutrient demands     Interventions/(treatment strategy):  Commercial beverages  Collaboration with medical providers      Nutrition Diagnosis Status:   New         Malnutrition Assessment                                       Reason for Assessment    Reason For Assessment: identified at risk by screening criteria    Diagnosis: infection/sepsis  Patient Active Problem List   Diagnosis    Intellectual disability    Impulse control disorder    Primary hypertension    Controlled type 2 diabetes mellitus, without long-term current use of insulin    Encounter for completion of form with patient    Amputated toe, right    Amputated toe of left foot    Hyperlipidemia    Benign prostatic hyperplasia    Sepsis    Anxiety and depression    Neuropathy    History of DVT (deep vein thrombosis)       Relevant Medical History:   Past Medical History:   Diagnosis Date    Diabetes mellitus, type 2 2004    BS didn't check 09/06/2022    Hyperlipidemia     Hypertension     Mental disability     Paranoid personality disorder     Peripheral neuropathy     Unspecified intellectual disabilities        Interdisciplinary Rounds: did not attend (RD remote)    General Information Comments:   10/25/2023: Patient is on a pureed IDDDI level 4 diet with fair intake.  Patient has caregiver.  Cognitive and Intellectual disabilities noted in medical diagnosis.  Labs  "reviewed.  FA.  LBM: 10/24/2023.  Weight history reviewed to show no weight loss.  RD to continue to monitor for nutrition related risks at each follow up visit.  RD to continue to monitor and follow.    Nutrition Discharge Planning: Patient to continue on recommended pureed, IDDSI level 4 diet post discharge    Nutrition Risk Screen    Nutrition Risk Screen: other (see comments) (unable to access, patient does appear thin with bony prominence easily visualized)    Nutrition/Diet History    Spiritual, Cultural Beliefs, Baptist Practices, Values that Affect Care: no  Food Allergies: NK    Anthropometrics    Temp: 98.7 °F (37.1 °C)  Height Method: Estimated  Height: 6' 1" (185.4 cm)  Height (inches): 73 in  Weight Method: Bed Scale  Weight: 75.3 kg (166 lb 0.1 oz)  Weight (lb): 166.01 lb  Ideal Body Weight (IBW), Male: 184 lb  % Ideal Body Weight, Male (lb): 90.22 %  BMI (Calculated): 21.9  BMI Grade: 18.5-24.9 - normal       Lab/Procedures/Meds    Pertinent Labs Reviewed: reviewed  BMP  Lab Results   Component Value Date     10/25/2023    K 4.0 10/25/2023    CL 98 10/25/2023    CO2 28 10/25/2023    BUN 9 10/25/2023    CREATININE 0.6 10/25/2023    CALCIUM 8.6 (L) 10/25/2023    ANIONGAP 12 10/25/2023    EGFRNORACEVR >60 10/25/2023     Lab Results   Component Value Date    HGBA1C 5.1 12/10/2021     Lab Results   Component Value Date    CALCIUM 8.6 (L) 10/25/2023    PHOS 4.2 10/25/2023       Pertinent Medications Reviewed: reviewed  Scheduled Meds:   apixaban  5 mg Oral BID    atorvastatin  10 mg Oral Daily    cefTRIAXone (ROCEPHIN) IVPB  1 g Intravenous Q24H    divalproex  1,000 mg Oral Nightly    divalproex ER  250 mg Oral Daily    FLUoxetine  40 mg Oral Daily    gabapentin  300 mg Oral TID    OXcarbazepine  600 mg Oral BID    potassium chloride SA  20 mEq Oral BID    QUEtiapine  400 mg Oral Nightly    risperiDONE  0.5 mg Oral BID    tamsulosin  1 capsule Oral Daily     Continuous Infusions:   lactated " ringers 125 mL/hr at 10/24/23 2241     PRN Meds:.acetaminophen, acetaminophen, albuterol-ipratropium, bisacodyL, dextrose 10%, dextrose 10%, glucagon (human recombinant), glucose, glucose, lactulose, ondansetron, polyethylene glycol, promethazine    Physical Findings/Assessment         Estimated/Assessed Needs    Weight Used For Calorie Calculations: 75.3 kg (166 lb 0.1 oz)  Energy Calorie Requirements (kcal): 25-30kcals/kg (1882-2259kcals/day)  Energy Need Method: Kcal/kg  Protein Requirements: 0.8-1.2g/kg (60-90g/day)  Weight Used For Protein Calculations: 75.3 kg (166 lb 0.1 oz)  Fluid Requirements (mL): 1ml/kcal  Estimated Fluid Requirement Method: RDA Method  RDA Method (mL): 25  CHO Requirement: 250      Nutrition Prescription Ordered    Current Diet Order: Pureed  Oral Nutrition Supplement: Boost    Evaluation of Received Nutrient/Fluid Intake    % Kcal Needs: 25-75%  % Protein Needs: 25-75%  I/O: +1420ml since admit  Energy Calories Required: not meeting needs  Protein Required: not meeting needs  Fluid Required: not meeting needs  Comments: LBM: 10/24/2023  Tolerance: tolerating  % Intake of Estimated Energy Needs: 25 - 50 %  % Meal Intake: 25 - 50 %    Nutrition Risk    Level of Risk/Frequency of Follow-up: moderate (follow up: 1-2x per week)     Monitor and Evaluation    Food and Nutrient Intake: energy intake, food and beverage intake  Food and Nutrient Adminstration: diet order  Physical Activity and Function: nutrition-related ADLs and IADLs, factors affecting access to physical activity  Anthropometric Measurements: height/length, weight, weight change, body mass index  Biochemical Data, Medical Tests and Procedures: electrolyte and renal panel, gastrointestinal profile, glucose/endocrine profile, inflammatory profile, lipid profile     Nutrition Follow-Up    RD Follow-up?: Yes  Yulia Macedo, MS, RDN, LDN

## 2023-10-25 NOTE — ASSESSMENT & PLAN NOTE
This patient does have evidence of infective focus  My overall impression is sepsis.  Source: Urinary Tract  Antibiotics given-   Antibiotics (72h ago, onward)    Start     Stop Route Frequency Ordered    10/25/23 1530  cefTRIAXone (ROCEPHIN) 1 g in dextrose 5 % in water (D5W) 100 mL IVPB (MB+)  ( Urinary Tract Infection (UTI))         10/30/23 1529 IV Every 24 hours (non-standard times) 10/24/23 1907        Latest lactate reviewed-  Recent Labs   Lab 10/24/23  1809   LACTATE 0.6     Organ dysfunction indicated by Encephalopathy    Fluid challenge Ideal Body Weight- The patient's ideal body weight is Ideal body weight: 79.9 kg (176 lb 2.4 oz) which will be used to calculate fluid bolus of 30 ml/kg for treatment of septic shock.      Post- resuscitation assessment No - Post resuscitation assessment not needed     Will Not start Pressors- Levophed for MAP of 65  Source control achieved by: Rocephin

## 2023-10-25 NOTE — SUBJECTIVE & OBJECTIVE
Interval History: Patient seen and examined with caretaker at bedside.  Patient complains of abdominal pain.  BP and temp much better controlled this morning.  Will continue IV antibiotics.    Review of Systems  Objective:     Vital Signs (Most Recent):  Temp: 96 °F (35.6 °C) (10/25/23 1029)  Pulse: 92 (10/25/23 0753)  Resp: 15 (10/25/23 0753)  BP: (!) 103/55 (10/25/23 0753)  SpO2: (!) 92 % (10/25/23 0753) Vital Signs (24h Range):  Temp:  [87.4 °F (30.8 °C)-97.6 °F (36.4 °C)] 96 °F (35.6 °C)  Pulse:  [55-97] 92  Resp:  [15-20] 15  SpO2:  [92 %-98 %] 92 %  BP: ()/(51-71) 103/55     Weight: 75.3 kg (166 lb 0.1 oz)  Body mass index is 21.9 kg/m².    Intake/Output Summary (Last 24 hours) at 10/25/2023 1035  Last data filed at 10/25/2023 0619  Gross per 24 hour   Intake 2220 ml   Output 800 ml   Net 1420 ml         Physical Exam  Vitals and nursing note reviewed.   Constitutional:       Appearance: Normal appearance.   HENT:      Head: Normocephalic and atraumatic.      Nose: Nose normal.      Mouth/Throat:      Mouth: Mucous membranes are moist.   Eyes:      Extraocular Movements: Extraocular movements intact.      Conjunctiva/sclera: Conjunctivae normal.   Cardiovascular:      Rate and Rhythm: Normal rate and regular rhythm.      Pulses: Normal pulses.      Heart sounds: Normal heart sounds.   Pulmonary:      Effort: Pulmonary effort is normal.      Breath sounds: Normal breath sounds.   Abdominal:      General: Abdomen is flat. Bowel sounds are normal.      Palpations: Abdomen is soft.      Tenderness: There is abdominal tenderness.   Musculoskeletal:         General: Normal range of motion.      Cervical back: Normal range of motion and neck supple.      Comments: Feet are dorsiflexed.   Skin:     General: Skin is warm.      Capillary Refill: Capillary refill takes less than 2 seconds.   Neurological:      Mental Status: He is alert. Mental status is at baseline.      Comments: Minimal verbal communications.   Patient is able to answer questions appropriately but not able to answer orientation questions due to underlying cognitive delays.   Psychiatric:         Mood and Affect: Mood normal.         Behavior: Behavior normal.             Significant Labs: All pertinent labs within the past 24 hours have been reviewed.  Recent Lab Results  (Last 5 results in the past 24 hours)        10/25/23  0455   10/24/23  1809   10/24/23  1808   10/24/23  1749   10/24/23  1426        Procalcitonin   0.14  Comment: A concentration < 0.25 ng/mL represents a low risk of bacterial   infection.  Procalcitonin may not be accurate among patients with localized   infection, recent trauma or major surgery, immunosuppressed state,   invasive fungal infection, renal dysfunction. Decisions regarding   initiation or continuation of antibiotic therapy should not be based   solely on procalcitonin levels.               Albumin 2.8                              ALT 33               Anion Gap 12               Aniso Slight               AST 31               Baso # 0.03               Basophil % 0.6               BILIRUBIN TOTAL 0.4  Comment: For infants and newborns, interpretation of results should be based  on gestational age, weight and in agreement with clinical  observations.    Premature Infant recommended reference ranges:  Up to 24 hours.............<8.0 mg/dL  Up to 48 hours............<12.0 mg/dL  3-5 days..................<15.0 mg/dL  6-29 days.................<15.0 mg/dL                 Blood Culture, Routine     No Growth to date  [P]   No Growth to date  [P]         BUN 9               Calcium 8.6               Chloride 98               CO2 28               Creatinine 0.6               Differential Method Automated               eGFR >60               Eos # 0.0               Eosinophil % 0.2               Giant Platelets Present               Glucose 57               Gran # (ANC) 4.3               Gran % 88.2               Hematocrit  32.6               Hemoglobin 11.2               Immature Grans (Abs) 0.02  Comment: Mild elevation in immature granulocytes is non specific and   can be seen in a variety of conditions including stress response,   acute inflammation, trauma and pregnancy. Correlation with other   laboratory and clinical findings is essential.                 Immature Granulocytes 0.4               Lactate, Thai   0.6  Comment: Falsely low lactic acid results can be found in samples   containing >=13.0 mg/dL total bilirubin and/or >=3.5 mg/dL   direct bilirubin.               Lymph # 0.2               Lymph % 3.1               Magnesium  1.5               MCH 32.3               MCHC 34.4               MCV 94               Mono # 0.4               Mono % 7.5               MPV 10.4               nRBC 0               Ovalocytes Occasional               Phosphorus Level 4.2               Platelet Estimate Decreased               Platelet Count 90               POCT Glucose         73       Potassium 4.0               PROTEIN TOTAL 6.1               RBC 3.47               RDW 14.7               Sodium 138               WBC 4.91                                       [P] - Preliminary Result               Significant Imaging:   X-Ray Abdomen AP 1 View   Final Result      As above         Electronically signed by: Noé Fernandez   Date:    10/24/2023   Time:    23:52

## 2023-10-25 NOTE — PLAN OF CARE
Nutrition Plan of Care:    Recommendations     1. Encourage po intake    2. Recommend commercial beverages: Boost, to help meet nutritional needs   3. Monitor labs and weights   4. Collaboration with medical providers     Goals: Patient to consume >75% of EEN/EPN prior to RD follow up.  Nutrition Goal Status: new  Communication of RD Recs: other (comment) (POC)     Assessment and Plan  Nutrition Problem  Increased nutrition needs      Related to (etiology):   Sepsis      Signs and Symptoms (as evidenced by):   Decreased po intake   Higher and modified nutrient demands      Interventions/(treatment strategy):  Commercial beverages  Collaboration with medical providers        Nutrition Diagnosis Status:   Jesus Macedo MS, RDN, LDN

## 2023-10-25 NOTE — ASSESSMENT & PLAN NOTE
"Patient is chronically on statin.will continue for now. Last Lipid Panel: No results found for: "CHOL", "HDL", "LDLCALC", "TRIG", "CHOLHDL"  Plan:  -Continue home medication  -low fat/low calorie diet    "

## 2023-10-25 NOTE — SUBJECTIVE & OBJECTIVE
Past Medical History:   Diagnosis Date    Diabetes mellitus, type 2 2004    BS didn't check 09/06/2022    Hyperlipidemia     Hypertension     Mental disability     Paranoid personality disorder     Peripheral neuropathy     Unspecified intellectual disabilities        History reviewed. No pertinent surgical history.    Review of patient's allergies indicates:   Allergen Reactions    Cephalexin        No current facility-administered medications on file prior to encounter.     Current Outpatient Medications on File Prior to Encounter   Medication Sig    amLODIPine (NORVASC) 5 MG tablet Take 5 mg by mouth once daily.    arginine-glutamine-calcium HMB (JOHN) 7-7-1.5 gram PwPk Take by mouth once daily.    ascorbic acid, vitamin C, (VITAMIN C) 100 MG tablet Take 100 mg by mouth once daily.    atorvastatin (LIPITOR) 10 MG tablet Take 10 mg by mouth once daily.    bisacodyL (DULCOLAX) 5 mg EC tablet Take 5 mg by mouth daily as needed for Constipation.    cyproheptadine (PERIACTIN) 4 mg tablet Take 4 mg by mouth 2 (two) times daily.    divalproex 500 MG Tb24 Take 1,000 mg by mouth nightly.    divalproex ER (DEPAKOTE ER) 250 MG 24 hr tablet Take 250 mg by mouth once daily.    ELIQUIS 5 mg Tab Take 5 mg by mouth 2 (two) times daily.    furosemide (LASIX) 20 MG tablet Take 20 mg by mouth 2 (two) times daily.    gabapentin (NEURONTIN) 300 MG capsule Take 300 mg by mouth 3 (three) times daily.    lactulose (CEPHULAC) 10 gram packet Take 10 g by mouth 3 (three) times daily.    OXcarbazepine (TRILEPTAL) 600 MG Tab Take 600 mg by mouth 2 (two) times daily.    potassium chloride SA (K-DUR,KLOR-CON) 20 MEQ tablet Take 20 mEq by mouth 2 (two) times daily.    QUEtiapine (SEROQUEL) 400 MG tablet Take 400 mg by mouth nightly.    risperiDONE (RISPERDAL) 0.5 MG Tab Take 0.5 mg by mouth 2 (two) times daily.    tamsulosin (FLOMAX) 0.4 mg Cap Take 1 capsule by mouth once daily.     Family History    None       Tobacco Use    Smoking status:  Former    Smokeless tobacco: Never   Substance and Sexual Activity    Alcohol use: Not on file    Drug use: Not on file    Sexual activity: Not on file     Review of Systems   Unable to perform ROS: Other   All other systems reviewed and are negative.    Objective:     Vital Signs (Most Recent):  Temp: (!) 92.5 °F (33.6 °C) (10/24/23 1930)  Pulse: 82 (10/24/23 2036)  Resp: 18 (10/24/23 2036)  BP: 102/71 (10/24/23 2036)  SpO2: (!) 94 % (10/24/23 2036) Vital Signs (24h Range):  Temp:  [87.4 °F (30.8 °C)-92.5 °F (33.6 °C)] 92.5 °F (33.6 °C)  Pulse:  [55-82] 82  Resp:  [16-20] 18  SpO2:  [94 %-98 %] 94 %  BP: ()/(51-71) 102/71     Weight: 70.8 kg (156 lb)  Body mass index is 20.58 kg/m².     Physical Exam  Vitals reviewed.   Constitutional:       General: He is not in acute distress.     Appearance: Normal appearance. He is normal weight. He is ill-appearing. He is not toxic-appearing or diaphoretic.   HENT:      Head: Normocephalic and atraumatic.      Right Ear: External ear normal.      Left Ear: External ear normal.      Nose: Nose normal. No congestion or rhinorrhea.      Mouth/Throat:      Mouth: Mucous membranes are moist.      Pharynx: Oropharynx is clear. No oropharyngeal exudate or posterior oropharyngeal erythema.   Eyes:      General: No scleral icterus.     Extraocular Movements: Extraocular movements intact.      Conjunctiva/sclera: Conjunctivae normal.      Pupils: Pupils are equal, round, and reactive to light.   Neck:      Vascular: No carotid bruit.   Cardiovascular:      Rate and Rhythm: Normal rate and regular rhythm.      Pulses: Normal pulses.      Heart sounds: Normal heart sounds. No murmur heard.     No friction rub. No gallop.   Pulmonary:      Effort: Pulmonary effort is normal. No respiratory distress.      Breath sounds: Normal breath sounds. No stridor. No wheezing, rhonchi or rales.   Chest:      Chest wall: No tenderness.   Abdominal:      General: Abdomen is flat. Bowel sounds are  normal. There is no distension.      Palpations: Abdomen is soft. There is no mass.      Tenderness: There is no abdominal tenderness. There is no guarding or rebound.      Hernia: No hernia is present.   Musculoskeletal:         General: No swelling, tenderness, deformity or signs of injury. Normal range of motion.      Cervical back: Normal range of motion and neck supple. No rigidity or tenderness.   Lymphadenopathy:      Cervical: No cervical adenopathy.   Skin:     General: Skin is warm and dry.      Capillary Refill: Capillary refill takes less than 2 seconds.      Coloration: Skin is not jaundiced or pale.      Findings: No bruising, erythema, lesion or rash.   Neurological:      Mental Status: He is alert.      Cranial Nerves: No cranial nerve deficit.      Sensory: No sensory deficit.      Motor: No weakness.      Coordination: Coordination normal.      Comments: Full neurological exam limited to presentation.  Patient awake and alert.  Minimally verbal at baseline but did voice not feeling well and pain in his belly.  Follows commands intermittently.   Psychiatric:         Mood and Affect: Mood normal.         Behavior: Behavior normal.         Thought Content: Thought content normal.         Judgment: Judgment normal.              CRANIAL NERVES     CN III, IV, VI   Pupils are equal, round, and reactive to light.       Significant Labs: All pertinent labs within the past 24 hours have been reviewed.    Significant Imaging: I have reviewed all pertinent imaging results/findings within the past 24 hours.    LABS:  Recent Results (from the past 24 hour(s))   Valproic Acid    Collection Time: 10/24/23  2:03 PM   Result Value Ref Range    Valproic Acid Level 65.8 50.0 - 100.0 ug/mL   CBC auto differential    Collection Time: 10/24/23  2:03 PM   Result Value Ref Range    WBC 3.07 (L) 3.90 - 12.70 K/uL    RBC 3.46 (L) 4.60 - 6.20 M/uL    Hemoglobin 11.2 (L) 14.0 - 18.0 g/dL    Hematocrit 32.9 (L) 40.0 - 54.0 %     MCV 95 82 - 98 fL    MCH 32.4 (H) 27.0 - 31.0 pg    MCHC 34.0 32.0 - 36.0 g/dL    RDW 14.4 11.5 - 14.5 %    Platelets 76 (L) 150 - 450 K/uL    MPV 10.1 9.2 - 12.9 fL    Immature Granulocytes 0.0 0.0 - 0.5 %    Gran # (ANC) 2.4 1.8 - 7.7 K/uL    Immature Grans (Abs) 0.00 0.00 - 0.04 K/uL    Lymph # 0.3 (L) 1.0 - 4.8 K/uL    Mono # 0.4 0.3 - 1.0 K/uL    Eos # 0.0 0.0 - 0.5 K/uL    Baso # 0.01 0.00 - 0.20 K/uL    nRBC 0 0 /100 WBC    Gran % 77.6 (H) 38.0 - 73.0 %    Lymph % 9.4 (L) 18.0 - 48.0 %    Mono % 12.4 4.0 - 15.0 %    Eosinophil % 0.3 0.0 - 8.0 %    Basophil % 0.3 0.0 - 1.9 %    Differential Method Automated    Comprehensive metabolic panel    Collection Time: 10/24/23  2:03 PM   Result Value Ref Range    Sodium 136 136 - 145 mmol/L    Potassium 4.8 3.5 - 5.1 mmol/L    Chloride 98 95 - 110 mmol/L    CO2 28 23 - 29 mmol/L    Glucose 75 70 - 110 mg/dL    BUN 10 6 - 20 mg/dL    Creatinine 0.6 0.5 - 1.4 mg/dL    Calcium 8.5 (L) 8.7 - 10.5 mg/dL    Total Protein 6.5 6.0 - 8.4 g/dL    Albumin 3.0 (L) 3.5 - 5.2 g/dL    Total Bilirubin 0.4 0.1 - 1.0 mg/dL    Alkaline Phosphatase 112 55 - 135 U/L    AST 40 10 - 40 U/L    ALT 37 10 - 44 U/L    eGFR >60 >60 mL/min/1.73 m^2    Anion Gap 10 8 - 16 mmol/L   Lipase    Collection Time: 10/24/23  2:03 PM   Result Value Ref Range    Lipase 13 4 - 60 U/L   Urinalysis, Reflex to Urine Culture Urine, Catheterized    Collection Time: 10/24/23  2:17 PM    Specimen: Urine   Result Value Ref Range    Specimen UA Urine, Catheterized     Color, UA Yellow Yellow, Straw, Elizabet    Appearance, UA Hazy (A) Clear    pH, UA 8.0 5.0 - 8.0    Specific Gravity, UA 1.015 1.005 - 1.030    Protein, UA 1+ (A) Negative    Glucose, UA Negative Negative    Ketones, UA Negative Negative    Bilirubin (UA) Negative Negative    Occult Blood UA 2+ (A) Negative    Nitrite, UA Positive (A) Negative    Urobilinogen, UA Negative <2.0 EU/dL    Leukocytes, UA 3+ (A) Negative   Urinalysis Microscopic     Collection Time: 10/24/23  2:17 PM   Result Value Ref Range    RBC, UA >100 (H) 0 - 4 /hpf    WBC, UA >100 (H) 0 - 5 /hpf    WBC Clumps, UA Many (A) None-Rare    Bacteria Moderate (A) None-Occ /hpf    Hyaline Casts, UA 2 (A) 0-1/lpf /lpf    Microscopic Comment SEE COMMENT    POCT glucose    Collection Time: 10/24/23  2:26 PM   Result Value Ref Range    POCT Glucose 73 70 - 110 mg/dL   Procalcitonin    Collection Time: 10/24/23  6:09 PM   Result Value Ref Range    Procalcitonin 0.14 <0.25 ng/mL   Lactic acid, plasma    Collection Time: 10/24/23  6:09 PM   Result Value Ref Range    Lactate (Lactic Acid) 0.6 0.5 - 2.2 mmol/L       RADIOLOGY  No results found.    EKG    MICROBIOLOGY    MDM

## 2023-10-25 NOTE — ASSESSMENT & PLAN NOTE
Most recent A1c measuring   Hemoglobin A1C   Date Value Ref Range Status   12/10/2021 5.1 <=6.5 % Final   Current blood glucose measuring 75  Plan:  -SSI  -Accu-checks   -Hypoglycemic protocol   -Continue home gabapentin   -Hold oral antihyperglycemics while inpatient

## 2023-10-25 NOTE — H&P
[facility]  [facility]  History & Physical    Patient Name: Juan Francisco Parker  MRN: [id number]  Patient Class: IP- Inpatient  Admission Date: 10/24/2023  Attending Physician: Telly Sommers MD   Primary Care Provider: Meaghan Primary Doctor         Patient information was obtained from patient, caregiver / friend, past medical records and ER records.     Subjective:     Principal Problem:Sepsis    Chief Complaint:   Chief Complaint   Patient presents with    Altered Mental Status     AMS coming from group home, per ems pt has been AMS since this morning and has frequent UTI's. Unable to obtain temp orally or axillary        HPI: Juan Francisco Parker is a 58 y.o. male with a PMH  has a past medical history of Diabetes mellitus, type 2 (2004), Hyperlipidemia, Hypertension, Mental disability, Paranoid personality disorder, Peripheral neuropathy, and Unspecified intellectual disabilities. who presented to the ED to the ED via EMS for further evaluation of worsening altered mental status since this morning.  History mainly obtained through chart review, ED sign-out, and caregiver at bedside as patient has underlying intellectual disability and only able to provide minimal information.  Patient has history of recurrent UTIs and was complaining of belly pain over the past few days.  Caregiver unsure of patient's last bowel movement but has continued to eat pureed food and drink fluids.  Patient usually ambulates with aid of wheelchair but is able to stand and walk around but has been mostly bed-bound the past few days.  No known alleviating or aggravating factors with noted with all other review of systems negative except as stated above.  Initial workup in the ED revealed patient met SIRS criteria for sepsis with UA positive for UTI.  Patient initiated on sepsis protocol and continued on Rocephin with cultures obtained and pending.  Patient being admitted to Hospital Medicine inpatient for continued medical  management.       PCP: No, Primary Doctor        Past Medical History:   Diagnosis Date    Diabetes mellitus, type 2 2004    BS didn't check 09/06/2022    Hyperlipidemia     Hypertension     Mental disability     Paranoid personality disorder     Peripheral neuropathy     Unspecified intellectual disabilities        History reviewed. No pertinent surgical history.    Review of patient's allergies indicates:   Allergen Reactions    Cephalexin        No current facility-administered medications on file prior to encounter.     Current Outpatient Medications on File Prior to Encounter   Medication Sig    amLODIPine (NORVASC) 5 MG tablet Take 5 mg by mouth once daily.    arginine-glutamine-calcium HMB (JOHN) 7-7-1.5 gram PwPk Take by mouth once daily.    ascorbic acid, vitamin C, (VITAMIN C) 100 MG tablet Take 100 mg by mouth once daily.    atorvastatin (LIPITOR) 10 MG tablet Take 10 mg by mouth once daily.    bisacodyL (DULCOLAX) 5 mg EC tablet Take 5 mg by mouth daily as needed for Constipation.    cyproheptadine (PERIACTIN) 4 mg tablet Take 4 mg by mouth 2 (two) times daily.    divalproex 500 MG Tb24 Take 1,000 mg by mouth nightly.    divalproex ER (DEPAKOTE ER) 250 MG 24 hr tablet Take 250 mg by mouth once daily.    ELIQUIS 5 mg Tab Take 5 mg by mouth 2 (two) times daily.    furosemide (LASIX) 20 MG tablet Take 20 mg by mouth 2 (two) times daily.    gabapentin (NEURONTIN) 300 MG capsule Take 300 mg by mouth 3 (three) times daily.    lactulose (CEPHULAC) 10 gram packet Take 10 g by mouth 3 (three) times daily.    OXcarbazepine (TRILEPTAL) 600 MG Tab Take 600 mg by mouth 2 (two) times daily.    potassium chloride SA (K-DUR,KLOR-CON) 20 MEQ tablet Take 20 mEq by mouth 2 (two) times daily.    QUEtiapine (SEROQUEL) 400 MG tablet Take 400 mg by mouth nightly.    risperiDONE (RISPERDAL) 0.5 MG Tab Take 0.5 mg by mouth 2 (two) times daily.    tamsulosin (FLOMAX) 0.4 mg Cap Take 1 capsule by mouth  once daily.     Family History    None       Tobacco Use    Smoking status: Former    Smokeless tobacco: Never   Substance and Sexual Activity    Alcohol use: Not on file    Drug use: Not on file    Sexual activity: Not on file     Review of Systems   Unable to perform ROS: Other   All other systems reviewed and are negative.    Objective:     Vital Signs (Most Recent):  Temp: (!) 92.5 °F (33.6 °C) (10/24/23 1930)  Pulse: 82 (10/24/23 2036)  Resp: 18 (10/24/23 2036)  BP: 102/71 (10/24/23 2036)  SpO2: (!) 94 % (10/24/23 2036) Vital Signs (24h Range):  Temp:  [87.4 °F (30.8 °C)-92.5 °F (33.6 °C)] 92.5 °F (33.6 °C)  Pulse:  [55-82] 82  Resp:  [16-20] 18  SpO2:  [94 %-98 %] 94 %  BP: ()/(51-71) 102/71     Weight: 70.8 kg (156 lb)  Body mass index is 20.58 kg/m².     Physical Exam  Vitals reviewed.   Constitutional:       General: He is not in acute distress.     Appearance: Normal appearance. He is normal weight. He is ill-appearing. He is not toxic-appearing or diaphoretic.   HENT:      Head: Normocephalic and atraumatic.      Right Ear: External ear normal.      Left Ear: External ear normal.      Nose: Nose normal. No congestion or rhinorrhea.      Mouth/Throat:      Mouth: Mucous membranes are moist.      Pharynx: Oropharynx is clear. No oropharyngeal exudate or posterior oropharyngeal erythema.   Eyes:      General: No scleral icterus.     Extraocular Movements: Extraocular movements intact.      Conjunctiva/sclera: Conjunctivae normal.      Pupils: Pupils are equal, round, and reactive to light.   Neck:      Vascular: No carotid bruit.   Cardiovascular:      Rate and Rhythm: Normal rate and regular rhythm.      Pulses: Normal pulses.      Heart sounds: Normal heart sounds. No murmur heard.     No friction rub. No gallop.   Pulmonary:      Effort: Pulmonary effort is normal. No respiratory distress.      Breath sounds: Normal breath sounds. No stridor. No wheezing, rhonchi or rales.   Chest:      Chest  wall: No tenderness.   Abdominal:      General: Abdomen is flat. Bowel sounds are normal. There is no distension.      Palpations: Abdomen is soft. There is no mass.      Tenderness: There is no abdominal tenderness. There is no guarding or rebound.      Hernia: No hernia is present.   Musculoskeletal:         General: No swelling, tenderness, deformity or signs of injury. Normal range of motion.      Cervical back: Normal range of motion and neck supple. No rigidity or tenderness.   Lymphadenopathy:      Cervical: No cervical adenopathy.   Skin:     General: Skin is warm and dry.      Capillary Refill: Capillary refill takes less than 2 seconds.      Coloration: Skin is not jaundiced or pale.      Findings: No bruising, erythema, lesion or rash.   Neurological:      Mental Status: He is alert.      Cranial Nerves: No cranial nerve deficit.      Sensory: No sensory deficit.      Motor: No weakness.      Coordination: Coordination normal.      Comments: Full neurological exam limited to presentation.  Patient awake and alert.  Minimally verbal at baseline but did voice not feeling well and pain in his belly.  Follows commands intermittently.   Psychiatric:         Mood and Affect: Mood normal.         Behavior: Behavior normal.         Thought Content: Thought content normal.         Judgment: Judgment normal.              CRANIAL NERVES     CN III, IV, VI   Pupils are equal, round, and reactive to light.       Significant Labs: All pertinent labs within the past 24 hours have been reviewed.    Significant Imaging: I have reviewed all pertinent imaging results/findings within the past 24 hours.    LABS:  Recent Results (from the past 24 hour(s))   Valproic Acid    Collection Time: 10/24/23  2:03 PM   Result Value Ref Range    Valproic Acid Level 65.8 50.0 - 100.0 ug/mL   CBC auto differential    Collection Time: 10/24/23  2:03 PM   Result Value Ref Range    WBC 3.07 (L) 3.90 - 12.70 K/uL    RBC 3.46 (L) 4.60 - 6.20  M/uL    Hemoglobin 11.2 (L) 14.0 - 18.0 g/dL    Hematocrit 32.9 (L) 40.0 - 54.0 %    MCV 95 82 - 98 fL    MCH 32.4 (H) 27.0 - 31.0 pg    MCHC 34.0 32.0 - 36.0 g/dL    RDW 14.4 11.5 - 14.5 %    Platelets 76 (L) 150 - 450 K/uL    MPV 10.1 9.2 - 12.9 fL    Immature Granulocytes 0.0 0.0 - 0.5 %    Gran # (ANC) 2.4 1.8 - 7.7 K/uL    Immature Grans (Abs) 0.00 0.00 - 0.04 K/uL    Lymph # 0.3 (L) 1.0 - 4.8 K/uL    Mono # 0.4 0.3 - 1.0 K/uL    Eos # 0.0 0.0 - 0.5 K/uL    Baso # 0.01 0.00 - 0.20 K/uL    nRBC 0 0 /100 WBC    Gran % 77.6 (H) 38.0 - 73.0 %    Lymph % 9.4 (L) 18.0 - 48.0 %    Mono % 12.4 4.0 - 15.0 %    Eosinophil % 0.3 0.0 - 8.0 %    Basophil % 0.3 0.0 - 1.9 %    Differential Method Automated    Comprehensive metabolic panel    Collection Time: 10/24/23  2:03 PM   Result Value Ref Range    Sodium 136 136 - 145 mmol/L    Potassium 4.8 3.5 - 5.1 mmol/L    Chloride 98 95 - 110 mmol/L    CO2 28 23 - 29 mmol/L    Glucose 75 70 - 110 mg/dL    BUN 10 6 - 20 mg/dL    Creatinine 0.6 0.5 - 1.4 mg/dL    Calcium 8.5 (L) 8.7 - 10.5 mg/dL    Total Protein 6.5 6.0 - 8.4 g/dL    Albumin 3.0 (L) 3.5 - 5.2 g/dL    Total Bilirubin 0.4 0.1 - 1.0 mg/dL    Alkaline Phosphatase 112 55 - 135 U/L    AST 40 10 - 40 U/L    ALT 37 10 - 44 U/L    eGFR >60 >60 mL/min/1.73 m^2    Anion Gap 10 8 - 16 mmol/L   Lipase    Collection Time: 10/24/23  2:03 PM   Result Value Ref Range    Lipase 13 4 - 60 U/L   Urinalysis, Reflex to Urine Culture Urine, Catheterized    Collection Time: 10/24/23  2:17 PM    Specimen: Urine   Result Value Ref Range    Specimen UA Urine, Catheterized     Color, UA Yellow Yellow, Straw, Elizabet    Appearance, UA Hazy (A) Clear    pH, UA 8.0 5.0 - 8.0    Specific Gravity, UA 1.015 1.005 - 1.030    Protein, UA 1+ (A) Negative    Glucose, UA Negative Negative    Ketones, UA Negative Negative    Bilirubin (UA) Negative Negative    Occult Blood UA 2+ (A) Negative    Nitrite, UA Positive (A) Negative    Urobilinogen, UA Negative  <2.0 EU/dL    Leukocytes, UA 3+ (A) Negative   Urinalysis Microscopic    Collection Time: 10/24/23  2:17 PM   Result Value Ref Range    RBC, UA >100 (H) 0 - 4 /hpf    WBC, UA >100 (H) 0 - 5 /hpf    WBC Clumps, UA Many (A) None-Rare    Bacteria Moderate (A) None-Occ /hpf    Hyaline Casts, UA 2 (A) 0-1/lpf /lpf    Microscopic Comment SEE COMMENT    POCT glucose    Collection Time: 10/24/23  2:26 PM   Result Value Ref Range    POCT Glucose 73 70 - 110 mg/dL   Procalcitonin    Collection Time: 10/24/23  6:09 PM   Result Value Ref Range    Procalcitonin 0.14 <0.25 ng/mL   Lactic acid, plasma    Collection Time: 10/24/23  6:09 PM   Result Value Ref Range    Lactate (Lactic Acid) 0.6 0.5 - 2.2 mmol/L       RADIOLOGY  No results found.    EKG    MICROBIOLOGY    MDM      Assessment/Plan:     * Sepsis  This patient does have evidence of infective focus  My overall impression is sepsis.  Source: Urinary Tract  Antibiotics given-   Antibiotics (72h ago, onward)    Start     Stop Route Frequency Ordered    10/25/23 1530  cefTRIAXone (ROCEPHIN) 1 g in dextrose 5 % in water (D5W) 100 mL IVPB (MB+)  ( Urinary Tract Infection (UTI))         10/30/23 1529 IV Every 24 hours (non-standard times) 10/24/23 1907        Latest lactate reviewed-  Recent Labs   Lab 10/24/23  1809   LACTATE 0.6     Organ dysfunction indicated by Encephalopathy    Fluid challenge Ideal Body Weight- The patient's ideal body weight is Ideal body weight: 79.9 kg (176 lb 2.4 oz) which will be used to calculate fluid bolus of 30 ml/kg for treatment of septic shock.      Post- resuscitation assessment No - Post resuscitation assessment not needed     Will Not start Pressors- Levophed for MAP of 65  Source control achieved by: Rocephin     Primary hypertension  Currently normotensive. BP currently ranging from  systolic.   Plan:  -Optimize pain control   -Hold home medications in setting of sepsis, titrate as needed   -Monitor BP  -IV hydralazine prn for  "SBP>160 or DBP>90         Controlled type 2 diabetes mellitus, without long-term current use of insulin  Most recent A1c measuring   Hemoglobin A1C   Date Value Ref Range Status   12/10/2021 5.1 <=6.5 % Final   Current blood glucose measuring 75  Plan:  -SSI  -Accu-checks   -Hypoglycemic protocol   -Continue home gabapentin   -Hold oral antihyperglycemics while inpatient       Intellectual disability    Anxiety and depression  Chronic. Stable. Not in acute exacerbation and currently denies endorsing any suicidal/homicidal ideations.   Plan:  -Continue home medications       Neuropathy  Currently on gabapentin outpatient.  Plan:  -continue home medication      Hyperlipidemia  Patient is chronically on statin.will continue for now. Last Lipid Panel: No results found for: "CHOL", "HDL", "LDLCALC", "TRIG", "CHOLHDL"  Plan:  -Continue home medication  -low fat/low calorie diet      Benign prostatic hyperplasia  Currently on Flomax outpatient.  Plan:  -continue home medication      History of DVT (deep vein thrombosis)  Currently on Eliquis b.i.d..  Plan:  -continue home medication      VTE Risk Mitigation (From admission, onward)         Ordered     apixaban tablet 5 mg  2 times daily         10/24/23 2234     Place sequential compression device  Until discontinued         10/24/23 1907     IP VTE LOW RISK PATIENT  Once         10/24/23 1907              //Core Measures   -DVT proph: SCDs, Eliquis    -Code status: Full    -Surrogate: none provided       Components of this note were documented using a voice recognition system and are subject to errors not corrected at the time the document was proof read. Please contact the author for any clarifications.       Telly Sommers MD  Department of Hospital Medicine  [facility]      COMPLETED  Family history is reviewed and has not changed   Pertinent information:      "

## 2023-10-25 NOTE — HPI
Juan Francisco Parker is a 58 y.o. male with a PMH  has a past medical history of Diabetes mellitus, type 2 (2004), Hyperlipidemia, Hypertension, Mental disability, Paranoid personality disorder, Peripheral neuropathy, and Unspecified intellectual disabilities. who presented to the ED to the ED via EMS for further evaluation of worsening altered mental status since this morning.  History mainly obtained through chart review, ED sign-out, and caregiver at bedside as patient has underlying intellectual disability and only able to provide minimal information.  Patient has history of recurrent UTIs and was complaining of belly pain over the past few days.  Caregiver unsure of patient's last bowel movement but has continued to eat pureed food and drink fluids.  Patient usually ambulates with aid of wheelchair but is able to stand and walk around but has been mostly bed-bound the past few days.  No known alleviating or aggravating factors with noted with all other review of systems negative except as stated above.  Initial workup in the ED revealed patient met SIRS criteria for sepsis with UA positive for UTI.  Patient initiated on sepsis protocol and continued on Rocephin with cultures obtained and pending.  Patient being admitted to Hospital Medicine inpatient for continued medical management.       PCP: No, Primary Doctor

## 2023-10-25 NOTE — PROGRESS NOTES
[facility]  Hospital Medicine  Progress Note    Patient Name: Juan Francisco Parker  MRN: [id number]  Patient Class: IP- Inpatient   Admission Date: 10/24/2023  Length of Stay: 1 days  Attending Physician: Darvin Moura MD  Primary Care Provider: Meaghan Primary Doctor        Subjective:     Principal Problem:Sepsis        HPI:  Juan Francisco Parker is a 58 y.o. male with a PMH  has a past medical history of Diabetes mellitus, type 2 (2004), Hyperlipidemia, Hypertension, Mental disability, Paranoid personality disorder, Peripheral neuropathy, and Unspecified intellectual disabilities. who presented to the ED to the ED via EMS for further evaluation of worsening altered mental status since this morning.  History mainly obtained through chart review, ED sign-out, and caregiver at bedside as patient has underlying intellectual disability and only able to provide minimal information.  Patient has history of recurrent UTIs and was complaining of belly pain over the past few days.  Caregiver unsure of patient's last bowel movement but has continued to eat pureed food and drink fluids.  Patient usually ambulates with aid of wheelchair but is able to stand and walk around but has been mostly bed-bound the past few days.  No known alleviating or aggravating factors with noted with all other review of systems negative except as stated above.  Initial workup in the ED revealed patient met SIRS criteria for sepsis with UA positive for UTI.  Patient initiated on sepsis protocol and continued on Rocephin with cultures obtained and pending.  Patient being admitted to Hospital Medicine inpatient for continued medical management.       PCP: Meaghan Primary Doctor        Overview/Hospital Course:  No notes on file    Interval History: Patient seen and examined with caretaker at bedside.  Patient complains of abdominal pain.  BP and temp much better controlled this morning.  Will continue IV antibiotics.    Review of Systems  Objective:     Vital  Signs (Most Recent):  Temp: 96 °F (35.6 °C) (10/25/23 1029)  Pulse: 92 (10/25/23 0753)  Resp: 15 (10/25/23 0753)  BP: (!) 103/55 (10/25/23 0753)  SpO2: (!) 92 % (10/25/23 0753) Vital Signs (24h Range):  Temp:  [87.4 °F (30.8 °C)-97.6 °F (36.4 °C)] 96 °F (35.6 °C)  Pulse:  [55-97] 92  Resp:  [15-20] 15  SpO2:  [92 %-98 %] 92 %  BP: ()/(51-71) 103/55     Weight: 75.3 kg (166 lb 0.1 oz)  Body mass index is 21.9 kg/m².    Intake/Output Summary (Last 24 hours) at 10/25/2023 1035  Last data filed at 10/25/2023 0619  Gross per 24 hour   Intake 2220 ml   Output 800 ml   Net 1420 ml         Physical Exam  Vitals and nursing note reviewed.   Constitutional:       Appearance: Normal appearance.   HENT:      Head: Normocephalic and atraumatic.      Nose: Nose normal.      Mouth/Throat:      Mouth: Mucous membranes are moist.   Eyes:      Extraocular Movements: Extraocular movements intact.      Conjunctiva/sclera: Conjunctivae normal.   Cardiovascular:      Rate and Rhythm: Normal rate and regular rhythm.      Pulses: Normal pulses.      Heart sounds: Normal heart sounds.   Pulmonary:      Effort: Pulmonary effort is normal.      Breath sounds: Normal breath sounds.   Abdominal:      General: Abdomen is flat. Bowel sounds are normal.      Palpations: Abdomen is soft.      Tenderness: There is abdominal tenderness.   Musculoskeletal:         General: Normal range of motion.      Cervical back: Normal range of motion and neck supple.      Comments: Feet are dorsiflexed.   Skin:     General: Skin is warm.      Capillary Refill: Capillary refill takes less than 2 seconds.   Neurological:      Mental Status: He is alert. Mental status is at baseline.      Comments: Minimal verbal communications.  Patient is able to answer questions appropriately but not able to answer orientation questions due to underlying cognitive delays.   Psychiatric:         Mood and Affect: Mood normal.         Behavior: Behavior normal.              Significant Labs: All pertinent labs within the past 24 hours have been reviewed.  Recent Lab Results  (Last 5 results in the past 24 hours)        10/25/23  0455   10/24/23  1809   10/24/23  1808   10/24/23  1749   10/24/23  1426        Procalcitonin   0.14  Comment: A concentration < 0.25 ng/mL represents a low risk of bacterial   infection.  Procalcitonin may not be accurate among patients with localized   infection, recent trauma or major surgery, immunosuppressed state,   invasive fungal infection, renal dysfunction. Decisions regarding   initiation or continuation of antibiotic therapy should not be based   solely on procalcitonin levels.               Albumin 2.8                              ALT 33               Anion Gap 12               Aniso Slight               AST 31               Baso # 0.03               Basophil % 0.6               BILIRUBIN TOTAL 0.4  Comment: For infants and newborns, interpretation of results should be based  on gestational age, weight and in agreement with clinical  observations.    Premature Infant recommended reference ranges:  Up to 24 hours.............<8.0 mg/dL  Up to 48 hours............<12.0 mg/dL  3-5 days..................<15.0 mg/dL  6-29 days.................<15.0 mg/dL                 Blood Culture, Routine     No Growth to date  [P]   No Growth to date  [P]         BUN 9               Calcium 8.6               Chloride 98               CO2 28               Creatinine 0.6               Differential Method Automated               eGFR >60               Eos # 0.0               Eosinophil % 0.2               Giant Platelets Present               Glucose 57               Gran # (ANC) 4.3               Gran % 88.2               Hematocrit 32.6               Hemoglobin 11.2               Immature Grans (Abs) 0.02  Comment: Mild elevation in immature granulocytes is non specific and   can be seen in a variety of conditions including stress response,   acute  inflammation, trauma and pregnancy. Correlation with other   laboratory and clinical findings is essential.                 Immature Granulocytes 0.4               Lactate, Thai   0.6  Comment: Falsely low lactic acid results can be found in samples   containing >=13.0 mg/dL total bilirubin and/or >=3.5 mg/dL   direct bilirubin.               Lymph # 0.2               Lymph % 3.1               Magnesium  1.5               MCH 32.3               MCHC 34.4               MCV 94               Mono # 0.4               Mono % 7.5               MPV 10.4               nRBC 0               Ovalocytes Occasional               Phosphorus Level 4.2               Platelet Estimate Decreased               Platelet Count 90               POCT Glucose         73       Potassium 4.0               PROTEIN TOTAL 6.1               RBC 3.47               RDW 14.7               Sodium 138               WBC 4.91                                       [P] - Preliminary Result               Significant Imaging:   X-Ray Abdomen AP 1 View   Final Result      As above         Electronically signed by: Noé Fernandez   Date:    10/24/2023   Time:    23:52             Assessment/Plan:      * Sepsis  This patient does have evidence of infective focus  My overall impression is sepsis.  Source: Urinary Tract  Antibiotics given-   Antibiotics (72h ago, onward)    Start     Stop Route Frequency Ordered    10/25/23 1530  cefTRIAXone (ROCEPHIN) 1 g in dextrose 5 % in water (D5W) 100 mL IVPB (MB+)  ( Urinary Tract Infection (UTI))         10/30/23 1529 IV Every 24 hours (non-standard times) 10/24/23 1907        Latest lactate reviewed-  Recent Labs   Lab 10/24/23  1809   LACTATE 0.6     Organ dysfunction indicated by Encephalopathy    Fluid challenge Ideal Body Weight- The patient's ideal body weight is Ideal body weight: 79.9 kg (176 lb 2.4 oz) which will be used to calculate fluid bolus of 30 ml/kg for treatment of septic shock.      Post- resuscitation  "assessment No - Post resuscitation assessment not needed     Will Not start Pressors- Levophed for MAP of 65  Source control achieved by: Rocephin     - Vitals more stable this morning  - Will restart leela hugger if temp <98    History of DVT (deep vein thrombosis)  Currently on Eliquis b.i.d..  Plan:  -continue home medication      Neuropathy  Currently on gabapentin outpatient.  Plan:  -continue home medication      Anxiety and depression  Chronic. Stable. Not in acute exacerbation and currently denies endorsing any suicidal/homicidal ideations.   Plan:  -Continue home medications       Benign prostatic hyperplasia  Currently on Flomax outpatient.  Plan:  -continue home medication      Hyperlipidemia  Patient is chronically on statin.will continue for now. Last Lipid Panel: No results found for: "CHOL", "HDL", "LDLCALC", "TRIG", "CHOLHDL"  Plan:  -Continue home medication  -low fat/low calorie diet      Controlled type 2 diabetes mellitus, without long-term current use of insulin  Most recent A1c measuring   Hemoglobin A1C   Date Value Ref Range Status   12/10/2021 5.1 <=6.5 % Final   Current blood glucose measuring 75  Plan:  -SSI  -Accu-checks   -Hypoglycemic protocol   -Continue home gabapentin   -Hold oral antihyperglycemics while inpatient       Primary hypertension  Currently normotensive. BP currently ranging from  systolic.   Plan:  -Optimize pain control   -Hold home medications in setting of sepsis, titrate as needed   -Monitor BP  -IV hydralazine prn for SBP>160 or DBP>90         Intellectual disability          VTE Risk Mitigation (From admission, onward)         Ordered     apixaban tablet 5 mg  2 times daily         10/24/23 2234     Place sequential compression device  Until discontinued         10/24/23 1907     IP VTE LOW RISK PATIENT  Once         10/24/23 1907                Discharge Planning   GEOFF:      Code Status: Full Code   Is the patient medically ready for discharge?:     Reason " for patient still in hospital (select all that apply): Patient trending condition, Laboratory test and Treatment                     Darvin Moura MD  Department of Hospital Medicine   [facility]

## 2023-10-25 NOTE — PLAN OF CARE
[facility]  Initial Discharge Assessment       Primary Care Provider: No, Primary Doctor    Admission Diagnosis: Mental disorder [F99]  Recurrent UTI [N39.0]  Intellectual disability [F79]  Hypothermia, initial encounter [T68.XXXA]    Admission Date: 10/24/2023  Expected Discharge Date:     Transition of Care Barriers: None    Payor: MEDICARE / Plan: MEDICARE PART A & B / Product Type: Government /     Extended Emergency Contact Information  Primary Emergency Contact: fidelina hooks  Mobile Phone: [phone]  Relation: Other  Preferred language: English   needed? No    Discharge Plan A: Group Home       No Pharmacies Listed    Initial Assessment (most recent)       Adult Discharge Assessment - 10/25/23 1519          Discharge Assessment    Assessment Type Discharge Planning Assessment     Confirmed/corrected address, phone number and insurance Yes     Confirmed Demographics Correct on Facesheet     Source of Information health care advocate     Communicated GEOFF with patient/caregiver Date not available/Unable to determine     Reason For Admission sepsis     People in Home facility resident     Facility Arrived From: [facility]     Do you expect to return to your current living situation? Yes     Do you have help at home or someone to help you manage your care at home? Yes     Who are your caregiver(s) and their phone number(s)? facility staff     Prior to hospitilization cognitive status: Alert/Oriented     Current cognitive status: Unable to Assess     Home Accessibility wheelchair accessible     Home Layout Able to live on 1st floor     Equipment Currently Used at Home wheelchair     Readmission within 30 days? No     Patient currently being followed by outpatient case management? No     Do you currently have service(s) that help you manage your care at home? No     Do you take prescription medications? Yes     Do you have prescription coverage? Yes     Coverage MCR     Do you have any  problems affording any of your prescribed medications? No     Is the patient taking medications as prescribed? yes     Who is going to help you get home at discharge? Facility     How do you get to doctors appointments? health plan transportation     Are you on dialysis? No     Do you take coumadin? No     DME Needed Upon Discharge  none     Transition of Care Barriers None     Discharge Plan A Group Home                   Anticipated DC dispo: return to group home   Prior Level of Function: dependent with ADLs   People in home: group home resident     Comments:  CM met with patient and caregiver at bedside to introduce role and discuss discharge planning. Facility staff will be help at home and can provide transport at time of discharge. Confirmed demographics, insurance, and emergency contacts. CM discharge needs depends on hospital progress. CM will continue following to assist with other needs.

## 2023-10-26 PROBLEM — R05.8 WEAK COUGH: Status: ACTIVE | Noted: 2023-10-26

## 2023-10-26 LAB
ALBUMIN SERPL BCP-MCNC: 2.2 G/DL (ref 3.5–5.2)
ALP SERPL-CCNC: 88 U/L (ref 55–135)
ALT SERPL W/O P-5'-P-CCNC: 25 U/L (ref 10–44)
ANION GAP SERPL CALC-SCNC: 10 MMOL/L (ref 8–16)
AST SERPL-CCNC: 22 U/L (ref 10–40)
BASOPHILS # BLD AUTO: 0 K/UL (ref 0–0.2)
BASOPHILS NFR BLD: 0 % (ref 0–1.9)
BILIRUB SERPL-MCNC: 0.3 MG/DL (ref 0.1–1)
BUN SERPL-MCNC: 14 MG/DL (ref 6–20)
CALCIUM SERPL-MCNC: 8.2 MG/DL (ref 8.7–10.5)
CHLORIDE SERPL-SCNC: 104 MMOL/L (ref 95–110)
CO2 SERPL-SCNC: 25 MMOL/L (ref 23–29)
CREAT SERPL-MCNC: 0.7 MG/DL (ref 0.5–1.4)
DIFFERENTIAL METHOD: ABNORMAL
EOSINOPHIL # BLD AUTO: 0 K/UL (ref 0–0.5)
EOSINOPHIL NFR BLD: 0 % (ref 0–8)
ERYTHROCYTE [DISTWIDTH] IN BLOOD BY AUTOMATED COUNT: 15 % (ref 11.5–14.5)
EST. GFR  (NO RACE VARIABLE): >60 ML/MIN/1.73 M^2
GLUCOSE SERPL-MCNC: 62 MG/DL (ref 70–110)
HCT VFR BLD AUTO: 27.7 % (ref 40–54)
HGB BLD-MCNC: 9.2 G/DL (ref 14–18)
IMM GRANULOCYTES # BLD AUTO: 0.01 K/UL (ref 0–0.04)
IMM GRANULOCYTES NFR BLD AUTO: 0.4 % (ref 0–0.5)
LYMPHOCYTES # BLD AUTO: 0.5 K/UL (ref 1–4.8)
LYMPHOCYTES NFR BLD: 21.2 % (ref 18–48)
MAGNESIUM SERPL-MCNC: 1.9 MG/DL (ref 1.6–2.6)
MCH RBC QN AUTO: 32.2 PG (ref 27–31)
MCHC RBC AUTO-ENTMCNC: 33.2 G/DL (ref 32–36)
MCV RBC AUTO: 97 FL (ref 82–98)
MONOCYTES # BLD AUTO: 0.3 K/UL (ref 0.3–1)
MONOCYTES NFR BLD: 12.9 % (ref 4–15)
NEUTROPHILS # BLD AUTO: 1.6 K/UL (ref 1.8–7.7)
NEUTROPHILS NFR BLD: 65.5 % (ref 38–73)
NRBC BLD-RTO: 1 /100 WBC
PHOSPHATE SERPL-MCNC: 3.7 MG/DL (ref 2.7–4.5)
PLATELET # BLD AUTO: 62 K/UL (ref 150–450)
PMV BLD AUTO: 10.4 FL (ref 9.2–12.9)
POCT GLUCOSE: 63 MG/DL (ref 70–110)
POCT GLUCOSE: 64 MG/DL (ref 70–110)
POCT GLUCOSE: 75 MG/DL (ref 70–110)
POCT GLUCOSE: 84 MG/DL (ref 70–110)
POTASSIUM SERPL-SCNC: 3.8 MMOL/L (ref 3.5–5.1)
PROT SERPL-MCNC: 5.1 G/DL (ref 6–8.4)
RBC # BLD AUTO: 2.86 M/UL (ref 4.6–6.2)
SODIUM SERPL-SCNC: 139 MMOL/L (ref 136–145)
WBC # BLD AUTO: 2.41 K/UL (ref 3.9–12.7)

## 2023-10-26 PROCEDURE — 25000003 PHARM REV CODE 250: Performed by: HOSPITALIST

## 2023-10-26 PROCEDURE — 94761 N-INVAS EAR/PLS OXIMETRY MLT: CPT

## 2023-10-26 PROCEDURE — 11000001 HC ACUTE MED/SURG PRIVATE ROOM

## 2023-10-26 PROCEDURE — 94640 AIRWAY INHALATION TREATMENT: CPT

## 2023-10-26 PROCEDURE — 36415 COLL VENOUS BLD VENIPUNCTURE: CPT | Performed by: HOSPITALIST

## 2023-10-26 PROCEDURE — 84100 ASSAY OF PHOSPHORUS: CPT | Performed by: HOSPITALIST

## 2023-10-26 PROCEDURE — 85025 COMPLETE CBC W/AUTO DIFF WBC: CPT | Performed by: HOSPITALIST

## 2023-10-26 PROCEDURE — 83735 ASSAY OF MAGNESIUM: CPT | Performed by: HOSPITALIST

## 2023-10-26 PROCEDURE — 25000242 PHARM REV CODE 250 ALT 637 W/ HCPCS: Performed by: INTERNAL MEDICINE

## 2023-10-26 PROCEDURE — 63600175 PHARM REV CODE 636 W HCPCS: Performed by: HOSPITALIST

## 2023-10-26 PROCEDURE — 80053 COMPREHEN METABOLIC PANEL: CPT | Performed by: HOSPITALIST

## 2023-10-26 RX ORDER — SODIUM CHLORIDE FOR INHALATION 3 %
4 VIAL, NEBULIZER (ML) INHALATION EVERY 12 HOURS
Status: DISCONTINUED | OUTPATIENT
Start: 2023-10-26 | End: 2023-10-27

## 2023-10-26 RX ADMIN — POTASSIUM CHLORIDE 20 MEQ: 1500 TABLET, EXTENDED RELEASE ORAL at 09:10

## 2023-10-26 RX ADMIN — SODIUM CHLORIDE 30 MG/ML INHALATION SOLUTION 4 ML: 30 SOLUTION INHALANT at 12:10

## 2023-10-26 RX ADMIN — ATORVASTATIN CALCIUM 10 MG: 10 TABLET, FILM COATED ORAL at 08:10

## 2023-10-26 RX ADMIN — QUETIAPINE FUMARATE 400 MG: 100 TABLET ORAL at 09:10

## 2023-10-26 RX ADMIN — OXCARBAZEPINE 600 MG: 150 TABLET, FILM COATED ORAL at 08:10

## 2023-10-26 RX ADMIN — DIVALPROEX SODIUM 250 MG: 500 TABLET, FILM COATED, EXTENDED RELEASE ORAL at 09:10

## 2023-10-26 RX ADMIN — POTASSIUM CHLORIDE 20 MEQ: 1500 TABLET, EXTENDED RELEASE ORAL at 08:10

## 2023-10-26 RX ADMIN — DIVALPROEX SODIUM 1000 MG: 500 TABLET, FILM COATED, EXTENDED RELEASE ORAL at 09:10

## 2023-10-26 RX ADMIN — GABAPENTIN 300 MG: 300 CAPSULE ORAL at 08:10

## 2023-10-26 RX ADMIN — OXCARBAZEPINE 600 MG: 150 TABLET, FILM COATED ORAL at 09:10

## 2023-10-26 RX ADMIN — RISPERIDONE 0.5 MG: 0.5 TABLET ORAL at 08:10

## 2023-10-26 RX ADMIN — GABAPENTIN 300 MG: 300 CAPSULE ORAL at 03:10

## 2023-10-26 RX ADMIN — GABAPENTIN 300 MG: 300 CAPSULE ORAL at 09:10

## 2023-10-26 RX ADMIN — DEXTROSE MONOHYDRATE 125 ML: 100 INJECTION, SOLUTION INTRAVENOUS at 09:10

## 2023-10-26 RX ADMIN — SODIUM CHLORIDE, POTASSIUM CHLORIDE, SODIUM LACTATE AND CALCIUM CHLORIDE: 600; 310; 30; 20 INJECTION, SOLUTION INTRAVENOUS at 03:10

## 2023-10-26 RX ADMIN — SODIUM CHLORIDE 30 MG/ML INHALATION SOLUTION 4 ML: 30 SOLUTION INHALANT at 07:10

## 2023-10-26 RX ADMIN — APIXABAN 5 MG: 2.5 TABLET, FILM COATED ORAL at 08:10

## 2023-10-26 RX ADMIN — FLUOXETINE 40 MG: 20 CAPSULE ORAL at 08:10

## 2023-10-26 RX ADMIN — TAMSULOSIN HYDROCHLORIDE 0.4 MG: 0.4 CAPSULE ORAL at 08:10

## 2023-10-26 RX ADMIN — CEFTRIAXONE 1 G: 1 INJECTION, POWDER, FOR SOLUTION INTRAMUSCULAR; INTRAVENOUS at 03:10

## 2023-10-26 NOTE — ASSESSMENT & PLAN NOTE
Currently on Eliquis b.i.d..  Plan:  -Hold Eliquis 5mg PO BID in the setting of pancytopenia (plt 62)

## 2023-10-26 NOTE — SUBJECTIVE & OBJECTIVE
Past Medical History:   Diagnosis Date    Diabetes mellitus, type 2 2004    BS didn't check 09/06/2022    Hyperlipidemia     Hypertension     Mental disability     Paranoid personality disorder     Peripheral neuropathy     Unspecified intellectual disabilities        History reviewed. No pertinent surgical history.    Review of patient's allergies indicates:   Allergen Reactions    Cephalexin        Family History    None       Tobacco Use    Smoking status: Former    Smokeless tobacco: Never   Substance and Sexual Activity    Alcohol use: Not on file    Drug use: Not on file    Sexual activity: Not on file         Review of Systems   Unable to perform ROS: Mental status change     Objective:     Vital Signs (Most Recent):  Temp: 97.6 °F (36.4 °C) (10/26/23 1136)  Pulse: 91 (10/26/23 1236)  Resp: 18 (10/26/23 1236)  BP: 123/76 (10/26/23 1136)  SpO2: 97 % (10/26/23 1236) Vital Signs (24h Range):  Temp:  [97.6 °F (36.4 °C)-99.4 °F (37.4 °C)] 97.6 °F (36.4 °C)  Pulse:  [72-99] 91  Resp:  [17-18] 18  SpO2:  [91 %-98 %] 97 %  BP: (101-131)/(51-76) 123/76     Weight: 74.3 kg (163 lb 12.8 oz)  Body mass index is 21.61 kg/m².      Intake/Output Summary (Last 24 hours) at 10/26/2023 1321  Last data filed at 10/26/2023 0635  Gross per 24 hour   Intake 1978.35 ml   Output --   Net 1978.35 ml        Physical Exam  Vitals and nursing note reviewed.   Constitutional:       General: He is not in acute distress.     Appearance: He is ill-appearing.   Cardiovascular:      Rate and Rhythm: Normal rate and regular rhythm.      Pulses: Normal pulses.      Heart sounds: No murmur heard.  Pulmonary:      Effort: Pulmonary effort is normal. No respiratory distress.      Breath sounds: Rhonchi present. No wheezing or rales.   Abdominal:      General: Abdomen is flat. There is no distension.      Palpations: Abdomen is soft.      Tenderness: There is no abdominal tenderness.   Musculoskeletal:      Right lower leg: No edema.      Left  lower leg: No edema.   Neurological:      Mental Status: He is alert.      Comments: Alert and tracking, though nonverbal.          Vents:       Lines/Drains/Airways       Peripheral Intravenous Line  Duration                  Peripheral IV - Single Lumen 10/24/23 1400 20 G Left Antecubital 1 day                    Significant Labs:    CBC/Anemia Profile:  Recent Labs   Lab 10/24/23  1403 10/25/23  0455 10/26/23  0509   WBC 3.07* 4.91 2.41*   HGB 11.2* 11.2* 9.2*   HCT 32.9* 32.6* 27.7*   PLT 76* 90* 62*   MCV 95 94 97   RDW 14.4 14.7* 15.0*        Chemistries:  Recent Labs   Lab 10/24/23  1403 10/25/23  0455 10/26/23  0509    138 139   K 4.8 4.0 3.8   CL 98 98 104   CO2 28 28 25   BUN 10 9 14   CREATININE 0.6 0.6 0.7   CALCIUM 8.5* 8.6* 8.2*   ALBUMIN 3.0* 2.8* 2.2*   PROT 6.5 6.1 5.1*   BILITOT 0.4 0.4 0.3   ALKPHOS 112 109 88   ALT 37 33 25   AST 40 31 22   MG  --  1.5* 1.9   PHOS  --  4.2 3.7       All pertinent labs within the past 24 hours have been reviewed.    Significant Imaging:   I have reviewed all pertinent imaging results/findings within the past 24 hours.

## 2023-10-26 NOTE — HOSPITAL COURSE
Mr Parker was initially admitted with severe sepsis requiring fluid resuscitation and a leela hugger.  With improvement in BP the fluids were stopped and with improvements in his temp the leela hugger has been weaned, requiring intermittent restarts.  On day 2 of hospitalization patient because lethargic  He appeared to be trying to cough up mucus but due to extreme weakness was not able to do so.  After aggressive suctioning he was able to have some mucus removed, but continued to have a weak cough. Pulmonology  was consulted.  The patient was started on 3% saline nebs to help with the congestion.  Chest xray Bi basilar low-grade infiltrates and/or atelectasis.  PT/OT/ST  - dysphagia diet and SNF. Urine culture Providencia rettgeri. Patient completed 7 day course of ceftriaxone. Hematology consulted due to pancytopenia. Labs ordered as requested and no significant deficiencies noted. His mental status improved. He had intermittent hypoglycemia requiring D5 infusion and dextrose tablets. Morning cortisol was low normal. Cortef was given. His blood sugar stabilized. His blood pressure medications were held due to soft blood pressure readings. His PCP [place] [facility] Patient seen and examined, stable for discharge.

## 2023-10-26 NOTE — ASSESSMENT & PLAN NOTE
This patient does have evidence of infective focus  My overall impression is sepsis.  Source: Urinary Tract  Antibiotics given-   Antibiotics (72h ago, onward)    Start     Stop Route Frequency Ordered    10/25/23 1530  cefTRIAXone (ROCEPHIN) 1 g in dextrose 5 % in water (D5W) 100 mL IVPB (MB+)  ( Urinary Tract Infection (UTI))         10/30/23 1529 IV Every 24 hours (non-standard times) 10/24/23 1907        Latest lactate reviewed-  Recent Labs   Lab 10/24/23  1809   LACTATE 0.6     Organ dysfunction indicated by Encephalopathy    Fluid challenge Ideal Body Weight- The patient's ideal body weight is Ideal body weight: 79.9 kg (176 lb 2.4 oz) which will be used to calculate fluid bolus of 30 ml/kg for treatment of septic shock.      Post- resuscitation assessment No - Post resuscitation assessment not needed     Will Not start Pressors- Levophed for MAP of 65  Source control achieved by: Rocephin     - Vitals more stable this morning  - Will restart leela hugger if temp <98  - Pancytopenic likely related to acute infection.  Will consider hematology consult if patient continues to decompensate

## 2023-10-26 NOTE — PROGRESS NOTES
[facility]  Hospital Medicine  Progress Note    Patient Name: Juan Francisco Parker  MRN: [id number]  Patient Class: IP- Inpatient   Admission Date: 10/24/2023  Length of Stay: 2 days  Attending Physician: Darvin Moura MD  Primary Care Provider: Meaghan Primary Doctor        Subjective:     Principal Problem:Sepsis        HPI:  Juan Francisco Parker is a 58 y.o. male with a PMH  has a past medical history of Diabetes mellitus, type 2 (2004), Hyperlipidemia, Hypertension, Mental disability, Paranoid personality disorder, Peripheral neuropathy, and Unspecified intellectual disabilities. who presented to the ED to the ED via EMS for further evaluation of worsening altered mental status since this morning.  History mainly obtained through chart review, ED sign-out, and caregiver at bedside as patient has underlying intellectual disability and only able to provide minimal information.  Patient has history of recurrent UTIs and was complaining of belly pain over the past few days.  Caregiver unsure of patient's last bowel movement but has continued to eat pureed food and drink fluids.  Patient usually ambulates with aid of wheelchair but is able to stand and walk around but has been mostly bed-bound the past few days.  No known alleviating or aggravating factors with noted with all other review of systems negative except as stated above.  Initial workup in the ED revealed patient met SIRS criteria for sepsis with UA positive for UTI.  Patient initiated on sepsis protocol and continued on Rocephin with cultures obtained and pending.  Patient being admitted to Hospital Medicine inpatient for continued medical management.       PCP: Meaghan Primary Doctor        Overview/Hospital Course:  Mr Parker was initially admitted with severe sepsis requiring fluid resuscitation and a leela hugger.  With improvement in BP the fluids were stopped and with improvements in his temp the leela hugger has been weaned, requiring intermittent restarts.  On  day 2 of hospitalization patient because very letheragic and was not follow commands.  He appeared to be trying to cough up mucus but due to extreme weakness was not able to do so.  After aggressive suctioning he was able to have some mucus removed, but continued to have a weak cough.  Dr. Cotter (pulmonary) was consulted.  He started the patient on 3% saline nebs to help with the congestion.  Chest xray ordered and PT/OT/ST ordered.  Patient is also continued on empiric Rocephin for his UTI and possible pulmonary infection.  Urine culture + gram negative rods.      Interval History: Patient seen and examined this morning with nurse and care taker at bedside.  Patient was more lethargic and BG noted to be 65.  D10 was given and patient noted to have difficulty with his cough reflex.  He was seen by pulmonary and started on 3% saline nebs.  PT/OT/ST ordered.    Review of Systems  Objective:     Vital Signs (Most Recent):  Temp: 97.6 °F (36.4 °C) (10/26/23 1136)  Pulse: 87 (10/26/23 1315)  Resp: 18 (10/26/23 1236)  BP: 123/76 (10/26/23 1136)  SpO2: 97 % (10/26/23 1236) Vital Signs (24h Range):  Temp:  [97.6 °F (36.4 °C)-99.4 °F (37.4 °C)] 97.6 °F (36.4 °C)  Pulse:  [72-99] 87  Resp:  [17-18] 18  SpO2:  [91 %-98 %] 97 %  BP: (107-131)/(57-76) 123/76     Weight: 74.3 kg (163 lb 12.8 oz)  Body mass index is 21.61 kg/m².    Intake/Output Summary (Last 24 hours) at 10/26/2023 1547  Last data filed at 10/26/2023 0635  Gross per 24 hour   Intake 1978.35 ml   Output --   Net 1978.35 ml         Physical Exam    Vitals and nursing note reviewed.   Constitutional:       Appearance: Normal appearance.   HENT:      Head: Normocephalic and atraumatic.      Nose: Nose normal.      Mouth/Throat:      Mouth: Mucous membranes are moist.   Eyes:      Extraocular Movements: Extraocular movements intact.      Conjunctiva/sclera: Conjunctivae normal.   Cardiovascular:      Rate and Rhythm: Normal rate and regular rhythm.      Pulses:  Normal pulses.      Heart sounds: Normal heart sounds.   Pulmonary:      Effort: Respiratory distress     Breath sounds: Coarse upper airway breathe sounds with rhonchi in all fields  Abdominal:      General: Abdomen is flat. Bowel sounds are normal.      Palpations: Abdomen is soft.   Musculoskeletal:         General: Normal range of motion.      Cervical back: Normal range of motion and neck supple.      Comments: Feet are dorsiflexed.   Skin:     General: Skin is warm.      Capillary Refill: Capillary refill takes less than 2 seconds.   Neurological:      Mental Status: He is alert. Mental status is at baseline.      Comments: more lethargic this morning, responds to questions with head shakes but not verbal responses like yesterday.  Psychiatric:         Mood and Affect: Mood normal.         Behavior: Behavior normal.        Significant Labs: All pertinent labs within the past 24 hours have been reviewed.  Recent Lab Results         10/26/23  0856   10/26/23  0509        Albumin   2.2       ALP   88       ALT   25       Anion Gap   10       AST   22       Baso #   0.00       Basophil %   0.0       BILIRUBIN TOTAL   0.3  Comment: For infants and newborns, interpretation of results should be based  on gestational age, weight and in agreement with clinical  observations.    Premature Infant recommended reference ranges:  Up to 24 hours.............<8.0 mg/dL  Up to 48 hours............<12.0 mg/dL  3-5 days..................<15.0 mg/dL  6-29 days.................<15.0 mg/dL         BUN   14       Calcium   8.2       Chloride   104       CO2   25       Creatinine   0.7       Differential Method   Automated       eGFR   >60       Eos #   0.0       Eosinophil %   0.0       Glucose   62       Gran # (ANC)   1.6       Gran %   65.5       Hematocrit   27.7       Hemoglobin   9.2       Immature Grans (Abs)   0.01  Comment: Mild elevation in immature granulocytes is non specific and   can be seen in a variety of conditions  including stress response,   acute inflammation, trauma and pregnancy. Correlation with other   laboratory and clinical findings is essential.         Immature Granulocytes   0.4       Lymph #   0.5       Lymph %   21.2       Magnesium    1.9       MCH   32.2       MCHC   33.2       MCV   97       Mono #   0.3       Mono %   12.9       MPV   10.4       nRBC   1       Phosphorus Level   3.7       Platelet Count   62       POCT Glucose 64         Potassium   3.8       PROTEIN TOTAL   5.1       RBC   2.86       RDW   15.0       Sodium   139       WBC   2.41               Significant Imaging:   X-Ray Chest 1 View   Final Result      Bi basilar low-grade infiltrates and/or atelectasis.      Short-term follow-up advised         Electronically signed by: Tristan Grullon MD   Date:    10/26/2023   Time:    14:27      X-Ray Abdomen AP 1 View   Final Result      As above         Electronically signed by: Noé Fernandez   Date:    10/24/2023   Time:    23:52             Assessment/Plan:      * Sepsis  This patient does have evidence of infective focus  My overall impression is sepsis.  Source: Urinary Tract  Antibiotics given-   Antibiotics (72h ago, onward)    Start     Stop Route Frequency Ordered    10/25/23 1530  cefTRIAXone (ROCEPHIN) 1 g in dextrose 5 % in water (D5W) 100 mL IVPB (MB+)  ( Urinary Tract Infection (UTI))         10/30/23 1529 IV Every 24 hours (non-standard times) 10/24/23 1907        Latest lactate reviewed-  Recent Labs   Lab 10/24/23  1809   LACTATE 0.6     Organ dysfunction indicated by Encephalopathy    Fluid challenge Ideal Body Weight- The patient's ideal body weight is Ideal body weight: 79.9 kg (176 lb 2.4 oz) which will be used to calculate fluid bolus of 30 ml/kg for treatment of septic shock.      Post- resuscitation assessment No - Post resuscitation assessment not needed     Will Not start Pressors- Levophed for MAP of 65  Source control achieved by: Rocephin     - Vitals more stable this  "morning  - Will restart leela hugger if temp <98  - Pancytopenic likely related to acute infection.  Will consider hematology consult if patient continues to decompensate    History of DVT (deep vein thrombosis)  Currently on Eliquis b.i.d..  Plan:  -Hold Eliquis 5mg PO BID in the setting of pancytopenia (plt 62)      Weak cough  - Appreciate pulmonary assistance  - 3% saline nebs  - PT/OT/ST    Neuropathy  Currently on gabapentin outpatient.  Plan:  -continue home medication      Anxiety and depression  Chronic. Stable. Not in acute exacerbation and currently denies endorsing any suicidal/homicidal ideations.   Plan:  -Continue home medications       Benign prostatic hyperplasia  Currently on Flomax outpatient.  Plan:  -continue home medication      Hyperlipidemia  Patient is chronically on statin.will continue for now. Last Lipid Panel: No results found for: "CHOL", "HDL", "LDLCALC", "TRIG", "CHOLHDL"  Plan:  -Continue home medication  -low fat/low calorie diet      Controlled type 2 diabetes mellitus, without long-term current use of insulin  Most recent A1c measuring   Hemoglobin A1C   Date Value Ref Range Status   12/10/2021 5.1 <=6.5 % Final   Current blood glucose measuring 75  Plan:  -SSI  -Accu-checks   -Hypoglycemic protocol   -Continue home gabapentin   -Hold oral antihyperglycemics while inpatient   -D10 given on 10/26      Primary hypertension  Currently normotensive. BP currently ranging from  systolic.   Plan:  -Optimize pain control   -Hold home medications in setting of sepsis, titrate as needed   -Monitor BP  -IV hydralazine prn for SBP>160 or DBP>90         Intellectual disability  - care taker from senior care at bedside.        VTE Risk Mitigation (From admission, onward)         Ordered     Place sequential compression device  Until discontinued         10/24/23 1907     IP VTE LOW RISK PATIENT  Once         10/24/23 1907                Discharge Planning   GEOFF:      Code Status: Full Code "   Is the patient medically ready for discharge?:     Reason for patient still in hospital (select all that apply): Patient unstable  Discharge Plan A: Group Home                  Darvin Moura MD  Department of Hospital Medicine   [facility]

## 2023-10-26 NOTE — HPI
Mr Parker is a 58-year-old male with intellectual disability who lives in a group home, diabetes mellitus, hyperlipidemia, hypertension, and paranoid personality disorder who was originally admitted to the Hospitalist Service on 10/24 after presenting with altered mental status. He was being treated for UTI. He was more lethargic and having difficulty clearing secretions, prompting pulmonary consult.      At the time of our initial exam, he is alert and tracking, though not conversant.  He has a weak cough with some posterior pharyngeal secretions observed.  Oxygenation is stable on room air.    Interval history:  10/28: Patient awake and alert this morning; talking with staff in the room. Cough appears improved from previous days. On room air.   10/30: on RA in NAD and appears happy and in good spirits, sitter at bedside, discussed with Dr. Penn

## 2023-10-26 NOTE — CONSULTS
[facility]  Pulmonology  Consult Note    Patient Name: Juan Francisco Parker  MRN: [id number]  Admission Date: 10/24/2023  Hospital Length of Stay: 2 days  Code Status: Full Code  Attending Physician: Darvin Moura MD  Primary Care Provider: Meaghan, Primary Doctor   Principal Problem: Sepsis    Subjective:     HPI:  Mr Parker is a 57 y/o WM with intellectual disability (lives in group home), DM, HLD, HTN, and paranoid personality disorder who was originally admitted to the Hospitalist service on 10/24 after presenting with altered mental status.  He is being treated for UTI.  This morning, he was more lethargic and having difficulty clearing secretions, prompting pulmonary consult.      At the time of my exam, he is alert and tracking, though not conversant.  He has a weak cough with some posterior pharyngeal secretions observed.  Oxygenation is stable on room air.      Past Medical History:   Diagnosis Date    Diabetes mellitus, type 2 2004    BS didn't check 09/06/2022    Hyperlipidemia     Hypertension     Mental disability     Paranoid personality disorder     Peripheral neuropathy     Unspecified intellectual disabilities        History reviewed. No pertinent surgical history.    Review of patient's allergies indicates:   Allergen Reactions    Cephalexin        Family History    None       Tobacco Use    Smoking status: Former    Smokeless tobacco: Never   Substance and Sexual Activity    Alcohol use: Not on file    Drug use: Not on file    Sexual activity: Not on file         Review of Systems   Unable to perform ROS: Mental status change     Objective:     Vital Signs (Most Recent):  Temp: 97.6 °F (36.4 °C) (10/26/23 1136)  Pulse: 91 (10/26/23 1236)  Resp: 18 (10/26/23 1236)  BP: 123/76 (10/26/23 1136)  SpO2: 97 % (10/26/23 1236) Vital Signs (24h Range):  Temp:  [97.6 °F (36.4 °C)-99.4 °F (37.4 °C)] 97.6 °F (36.4 °C)  Pulse:  [72-99] 91  Resp:  [17-18] 18  SpO2:  [91 %-98 %] 97 %  BP:  (101-131)/(51-76) 123/76     Weight: 74.3 kg (163 lb 12.8 oz)  Body mass index is 21.61 kg/m².      Intake/Output Summary (Last 24 hours) at 10/26/2023 1321  Last data filed at 10/26/2023 0635  Gross per 24 hour   Intake 1978.35 ml   Output --   Net 1978.35 ml        Physical Exam  Vitals and nursing note reviewed.   Constitutional:       General: He is not in acute distress.     Appearance: He is ill-appearing.   Cardiovascular:      Rate and Rhythm: Normal rate and regular rhythm.      Pulses: Normal pulses.      Heart sounds: No murmur heard.  Pulmonary:      Effort: Pulmonary effort is normal. No respiratory distress.      Breath sounds: Rhonchi present. No wheezing or rales.   Abdominal:      General: Abdomen is flat. There is no distension.      Palpations: Abdomen is soft.      Tenderness: There is no abdominal tenderness.   Musculoskeletal:      Right lower leg: No edema.      Left lower leg: No edema.   Neurological:      Mental Status: He is alert.      Comments: Alert and tracking, though nonverbal.          Vents:       Lines/Drains/Airways       Peripheral Intravenous Line  Duration                  Peripheral IV - Single Lumen 10/24/23 1400 20 G Left Antecubital 1 day                    Significant Labs:    CBC/Anemia Profile:  Recent Labs   Lab 10/24/23  1403 10/25/23  0455 10/26/23  0509   WBC 3.07* 4.91 2.41*   HGB 11.2* 11.2* 9.2*   HCT 32.9* 32.6* 27.7*   PLT 76* 90* 62*   MCV 95 94 97   RDW 14.4 14.7* 15.0*        Chemistries:  Recent Labs   Lab 10/24/23  1403 10/25/23  0455 10/26/23  0509    138 139   K 4.8 4.0 3.8   CL 98 98 104   CO2 28 28 25   BUN 10 9 14   CREATININE 0.6 0.6 0.7   CALCIUM 8.5* 8.6* 8.2*   ALBUMIN 3.0* 2.8* 2.2*   PROT 6.5 6.1 5.1*   BILITOT 0.4 0.4 0.3   ALKPHOS 112 109 88   ALT 37 33 25   AST 40 31 22   MG  --  1.5* 1.9   PHOS  --  4.2 3.7       All pertinent labs within the past 24 hours have been reviewed.    Significant Imaging:   I have reviewed all pertinent  "imaging results/findings within the past 24 hours.      ABG  No results for input(s): "PH", "PO2", "PCO2", "HCO3", "BE" in the last 168 hours.  Assessment/Plan:     Pulmonary  Weak cough  - likely 2/2 to AMS from his infection  - NT suction PRN  - will add 3% nebs to help thin up his secretions  - already getting Abx for UTI that should cover the vast majority of respiratory pathogens  - CXR pending  - PT/OT/ST and up out of bed as much as possible          Thank you for your consult. I will follow-up with patient. Please contact us if you have any additional questions.     Kahlil Cotter MD  Pulmonology  [facility]      "

## 2023-10-26 NOTE — SUBJECTIVE & OBJECTIVE
Interval History: Patient seen and examined this morning with nurse and care taker at bedside.  Patient was more lethargic and BG noted to be 65.  D10 was given and patient noted to have difficulty with his cough reflex.  He was seen by pulmonary and started on 3% saline nebs.  PT/OT/ST ordered.    Review of Systems  Objective:     Vital Signs (Most Recent):  Temp: 97.6 °F (36.4 °C) (10/26/23 1136)  Pulse: 87 (10/26/23 1315)  Resp: 18 (10/26/23 1236)  BP: 123/76 (10/26/23 1136)  SpO2: 97 % (10/26/23 1236) Vital Signs (24h Range):  Temp:  [97.6 °F (36.4 °C)-99.4 °F (37.4 °C)] 97.6 °F (36.4 °C)  Pulse:  [72-99] 87  Resp:  [17-18] 18  SpO2:  [91 %-98 %] 97 %  BP: (107-131)/(57-76) 123/76     Weight: 74.3 kg (163 lb 12.8 oz)  Body mass index is 21.61 kg/m².    Intake/Output Summary (Last 24 hours) at 10/26/2023 1547  Last data filed at 10/26/2023 0635  Gross per 24 hour   Intake 1978.35 ml   Output --   Net 1978.35 ml         Physical Exam    Vitals and nursing note reviewed.   Constitutional:       Appearance: Normal appearance.   HENT:      Head: Normocephalic and atraumatic.      Nose: Nose normal.      Mouth/Throat:      Mouth: Mucous membranes are moist.   Eyes:      Extraocular Movements: Extraocular movements intact.      Conjunctiva/sclera: Conjunctivae normal.   Cardiovascular:      Rate and Rhythm: Normal rate and regular rhythm.      Pulses: Normal pulses.      Heart sounds: Normal heart sounds.   Pulmonary:      Effort: Respiratory distress     Breath sounds: Coarse upper airway breathe sounds with rhonchi in all fields  Abdominal:      General: Abdomen is flat. Bowel sounds are normal.      Palpations: Abdomen is soft.   Musculoskeletal:         General: Normal range of motion.      Cervical back: Normal range of motion and neck supple.      Comments: Feet are dorsiflexed.   Skin:     General: Skin is warm.      Capillary Refill: Capillary refill takes less than 2 seconds.   Neurological:      Mental  Status: He is alert. Mental status is at baseline.      Comments: more lethargic this morning, responds to questions with head shakes but not verbal responses like yesterday.  Psychiatric:         Mood and Affect: Mood normal.         Behavior: Behavior normal.        Significant Labs: All pertinent labs within the past 24 hours have been reviewed.  Recent Lab Results         10/26/23  0856   10/26/23  0509        Albumin   2.2       ALP   88       ALT   25       Anion Gap   10       AST   22       Baso #   0.00       Basophil %   0.0       BILIRUBIN TOTAL   0.3  Comment: For infants and newborns, interpretation of results should be based  on gestational age, weight and in agreement with clinical  observations.    Premature Infant recommended reference ranges:  Up to 24 hours.............<8.0 mg/dL  Up to 48 hours............<12.0 mg/dL  3-5 days..................<15.0 mg/dL  6-29 days.................<15.0 mg/dL         BUN   14       Calcium   8.2       Chloride   104       CO2   25       Creatinine   0.7       Differential Method   Automated       eGFR   >60       Eos #   0.0       Eosinophil %   0.0       Glucose   62       Gran # (ANC)   1.6       Gran %   65.5       Hematocrit   27.7       Hemoglobin   9.2       Immature Grans (Abs)   0.01  Comment: Mild elevation in immature granulocytes is non specific and   can be seen in a variety of conditions including stress response,   acute inflammation, trauma and pregnancy. Correlation with other   laboratory and clinical findings is essential.         Immature Granulocytes   0.4       Lymph #   0.5       Lymph %   21.2       Magnesium    1.9       MCH   32.2       MCHC   33.2       MCV   97       Mono #   0.3       Mono %   12.9       MPV   10.4       nRBC   1       Phosphorus Level   3.7       Platelet Count   62       POCT Glucose 64         Potassium   3.8       PROTEIN TOTAL   5.1       RBC   2.86       RDW   15.0       Sodium   139       WBC   2.41                Significant Imaging:   X-Ray Chest 1 View   Final Result      Bi basilar low-grade infiltrates and/or atelectasis.      Short-term follow-up advised         Electronically signed by: Tristan Grullon MD   Date:    10/26/2023   Time:    14:27      X-Ray Abdomen AP 1 View   Final Result      As above         Electronically signed by: Noé Fernandez   Date:    10/24/2023   Time:    23:52

## 2023-10-26 NOTE — ASSESSMENT & PLAN NOTE
Most recent A1c measuring   Hemoglobin A1C   Date Value Ref Range Status   12/10/2021 5.1 <=6.5 % Final   Current blood glucose measuring 75  Plan:  -SSI  -Accu-checks   -Hypoglycemic protocol   -Continue home gabapentin   -Hold oral antihyperglycemics while inpatient   -D10 given on 10/26

## 2023-10-26 NOTE — PLAN OF CARE
Problem: Adult Inpatient Plan of Care  Goal: Plan of Care Review  Outcome: Ongoing, Progressing  Goal: Patient-Specific Goal (Individualized)  Outcome: Ongoing, Progressing  Goal: Absence of Hospital-Acquired Illness or Injury  Outcome: Ongoing, Progressing  Goal: Optimal Comfort and Wellbeing  Outcome: Ongoing, Progressing  Goal: Readiness for Transition of Care  Outcome: Ongoing, Progressing     Problem: Diabetes Comorbidity  Goal: Blood Glucose Level Within Targeted Range  Outcome: Ongoing, Progressing     Problem: Adjustment to Illness (Sepsis/Septic Shock)  Goal: Optimal Coping  Outcome: Ongoing, Progressing     Problem: Bleeding (Sepsis/Septic Shock)  Goal: Absence of Bleeding  Outcome: Ongoing, Progressing     Problem: Glycemic Control Impaired (Sepsis/Septic Shock)  Goal: Blood Glucose Level Within Desired Range  Outcome: Ongoing, Progressing     Problem: Infection Progression (Sepsis/Septic Shock)  Goal: Absence of Infection Signs and Symptoms  Outcome: Ongoing, Progressing     Problem: Nutrition Impaired (Sepsis/Septic Shock)  Goal: Optimal Nutrition Intake  Outcome: Ongoing, Progressing     Problem: Skin Injury Risk Increased  Goal: Skin Health and Integrity  Outcome: Ongoing, Progressing     Problem: Impaired Wound Healing  Goal: Optimal Wound Healing  Outcome: Ongoing, Progressing     Problem: Fall Injury Risk  Goal: Absence of Fall and Fall-Related Injury  Outcome: Ongoing, Progressing     Problem: UTI (Urinary Tract Infection)  Goal: Improved Infection Symptoms  Outcome: Ongoing, Progressing

## 2023-10-26 NOTE — ASSESSMENT & PLAN NOTE
- likely 2/2 to AMS from his infection  - NT suction PRN  - will add 3% nebs to help thin up his secretions  - already getting Abx for UTI that should cover the vast majority of respiratory pathogens  - CXR pending  - PT/OT/ST and up out of bed as much as possible

## 2023-10-27 LAB
ALBUMIN SERPL BCP-MCNC: 2.3 G/DL (ref 3.5–5.2)
ALP SERPL-CCNC: 98 U/L (ref 55–135)
ALT SERPL W/O P-5'-P-CCNC: 27 U/L (ref 10–44)
ANION GAP SERPL CALC-SCNC: 12 MMOL/L (ref 8–16)
AST SERPL-CCNC: 24 U/L (ref 10–40)
BASOPHILS # BLD AUTO: 0.01 K/UL (ref 0–0.2)
BASOPHILS NFR BLD: 0.3 % (ref 0–1.9)
BILIRUB SERPL-MCNC: 0.4 MG/DL (ref 0.1–1)
BUN SERPL-MCNC: 14 MG/DL (ref 6–20)
CALCIUM SERPL-MCNC: 8.2 MG/DL (ref 8.7–10.5)
CHLORIDE SERPL-SCNC: 104 MMOL/L (ref 95–110)
CO2 SERPL-SCNC: 21 MMOL/L (ref 23–29)
CREAT SERPL-MCNC: 0.6 MG/DL (ref 0.5–1.4)
DIFFERENTIAL METHOD: ABNORMAL
EOSINOPHIL # BLD AUTO: 0 K/UL (ref 0–0.5)
EOSINOPHIL NFR BLD: 0.3 % (ref 0–8)
ERYTHROCYTE [DISTWIDTH] IN BLOOD BY AUTOMATED COUNT: 15.2 % (ref 11.5–14.5)
EST. GFR  (NO RACE VARIABLE): >60 ML/MIN/1.73 M^2
GLUCOSE SERPL-MCNC: 63 MG/DL (ref 70–110)
HCT VFR BLD AUTO: 30.5 % (ref 40–54)
HGB BLD-MCNC: 9.9 G/DL (ref 14–18)
IMM GRANULOCYTES # BLD AUTO: 0.01 K/UL (ref 0–0.04)
IMM GRANULOCYTES NFR BLD AUTO: 0.3 % (ref 0–0.5)
LYMPHOCYTES # BLD AUTO: 0.6 K/UL (ref 1–4.8)
LYMPHOCYTES NFR BLD: 20.9 % (ref 18–48)
MAGNESIUM SERPL-MCNC: 1.8 MG/DL (ref 1.6–2.6)
MCH RBC QN AUTO: 31.6 PG (ref 27–31)
MCHC RBC AUTO-ENTMCNC: 32.5 G/DL (ref 32–36)
MCV RBC AUTO: 97 FL (ref 82–98)
MONOCYTES # BLD AUTO: 0.4 K/UL (ref 0.3–1)
MONOCYTES NFR BLD: 13.2 % (ref 4–15)
NEUTROPHILS # BLD AUTO: 1.9 K/UL (ref 1.8–7.7)
NEUTROPHILS NFR BLD: 65 % (ref 38–73)
NRBC BLD-RTO: 0 /100 WBC
PHOSPHATE SERPL-MCNC: 4.1 MG/DL (ref 2.7–4.5)
PLATELET # BLD AUTO: 58 K/UL (ref 150–450)
PMV BLD AUTO: 11.2 FL (ref 9.2–12.9)
POCT GLUCOSE: 104 MG/DL (ref 70–110)
POCT GLUCOSE: 104 MG/DL (ref 70–110)
POCT GLUCOSE: 59 MG/DL (ref 70–110)
POCT GLUCOSE: 77 MG/DL (ref 70–110)
POCT GLUCOSE: 83 MG/DL (ref 70–110)
POTASSIUM SERPL-SCNC: 4 MMOL/L (ref 3.5–5.1)
PROT SERPL-MCNC: 5.6 G/DL (ref 6–8.4)
RBC # BLD AUTO: 3.13 M/UL (ref 4.6–6.2)
SODIUM SERPL-SCNC: 137 MMOL/L (ref 136–145)
WBC # BLD AUTO: 2.96 K/UL (ref 3.9–12.7)

## 2023-10-27 PROCEDURE — 84100 ASSAY OF PHOSPHORUS: CPT | Performed by: HOSPITALIST

## 2023-10-27 PROCEDURE — 25000242 PHARM REV CODE 250 ALT 637 W/ HCPCS: Performed by: HOSPITALIST

## 2023-10-27 PROCEDURE — 11000001 HC ACUTE MED/SURG PRIVATE ROOM

## 2023-10-27 PROCEDURE — 94761 N-INVAS EAR/PLS OXIMETRY MLT: CPT

## 2023-10-27 PROCEDURE — 97530 THERAPEUTIC ACTIVITIES: CPT

## 2023-10-27 PROCEDURE — 92610 EVALUATE SWALLOWING FUNCTION: CPT

## 2023-10-27 PROCEDURE — 97162 PT EVAL MOD COMPLEX 30 MIN: CPT

## 2023-10-27 PROCEDURE — 97535 SELF CARE MNGMENT TRAINING: CPT

## 2023-10-27 PROCEDURE — 80053 COMPREHEN METABOLIC PANEL: CPT | Performed by: HOSPITALIST

## 2023-10-27 PROCEDURE — 94640 AIRWAY INHALATION TREATMENT: CPT

## 2023-10-27 PROCEDURE — 85025 COMPLETE CBC W/AUTO DIFF WBC: CPT | Performed by: HOSPITALIST

## 2023-10-27 PROCEDURE — 25000003 PHARM REV CODE 250: Performed by: HOSPITALIST

## 2023-10-27 PROCEDURE — 25000242 PHARM REV CODE 250 ALT 637 W/ HCPCS: Performed by: INTERNAL MEDICINE

## 2023-10-27 PROCEDURE — 97166 OT EVAL MOD COMPLEX 45 MIN: CPT

## 2023-10-27 PROCEDURE — 63600175 PHARM REV CODE 636 W HCPCS: Performed by: HOSPITALIST

## 2023-10-27 PROCEDURE — 83735 ASSAY OF MAGNESIUM: CPT | Performed by: HOSPITALIST

## 2023-10-27 PROCEDURE — 36415 COLL VENOUS BLD VENIPUNCTURE: CPT | Performed by: HOSPITALIST

## 2023-10-27 RX ADMIN — IPRATROPIUM BROMIDE AND ALBUTEROL SULFATE 3 ML: .5; 3 SOLUTION RESPIRATORY (INHALATION) at 05:10

## 2023-10-27 RX ADMIN — GABAPENTIN 300 MG: 300 CAPSULE ORAL at 08:10

## 2023-10-27 RX ADMIN — OXCARBAZEPINE 600 MG: 150 TABLET, FILM COATED ORAL at 08:10

## 2023-10-27 RX ADMIN — DEXTROSE MONOHYDRATE 125 ML: 100 INJECTION, SOLUTION INTRAVENOUS at 04:10

## 2023-10-27 RX ADMIN — GABAPENTIN 300 MG: 300 CAPSULE ORAL at 03:10

## 2023-10-27 RX ADMIN — RISPERIDONE 0.5 MG: 0.5 TABLET ORAL at 08:10

## 2023-10-27 RX ADMIN — TAMSULOSIN HYDROCHLORIDE 0.4 MG: 0.4 CAPSULE ORAL at 08:10

## 2023-10-27 RX ADMIN — SODIUM CHLORIDE 30 MG/ML INHALATION SOLUTION 4 ML: 30 SOLUTION INHALANT at 08:10

## 2023-10-27 RX ADMIN — DIVALPROEX SODIUM 250 MG: 500 TABLET, FILM COATED, EXTENDED RELEASE ORAL at 08:10

## 2023-10-27 RX ADMIN — POTASSIUM CHLORIDE 20 MEQ: 1500 TABLET, EXTENDED RELEASE ORAL at 08:10

## 2023-10-27 RX ADMIN — DIVALPROEX SODIUM 1000 MG: 500 TABLET, FILM COATED, EXTENDED RELEASE ORAL at 08:10

## 2023-10-27 RX ADMIN — QUETIAPINE FUMARATE 400 MG: 100 TABLET ORAL at 08:10

## 2023-10-27 RX ADMIN — CEFTRIAXONE 1 G: 1 INJECTION, POWDER, FOR SOLUTION INTRAMUSCULAR; INTRAVENOUS at 03:10

## 2023-10-27 RX ADMIN — ATORVASTATIN CALCIUM 10 MG: 10 TABLET, FILM COATED ORAL at 08:10

## 2023-10-27 RX ADMIN — FLUOXETINE 40 MG: 20 CAPSULE ORAL at 08:10

## 2023-10-27 NOTE — PT/OT/SLP EVAL
Physical Therapy Evaluation and Treatment    Patient Name: Juan Francisco Parker   MRN: [id number]  Recent Surgery: * No surgery found *      Recommendations:     Discharge Recommendations: Low Intensity Therapy   Discharge Equipment Recommendations: to be determined by next level of care   Barriers to discharge: Increased level of assist    Assessment:     Juan Francisco Parker is a 58 y.o. male admitted with a medical diagnosis of Sepsis. He presents with the following impairments/functional limitations: weakness, impaired endurance, impaired functional mobility, gait instability, impaired balance, pain, decreased safety awareness, decreased lower extremity function, decreased coordination, impaired cardiopulmonary response to activity, impaired cognition, decreased ROM, abnormal tone.    Rehab Prognosis: Good; patient would benefit from acute PT services to address these deficits and reach maximum level of function.    Plan:     During this hospitalization, patient to be seen 3 x/week to address the above listed problems via gait training, therapeutic activities, therapeutic exercises    Plan of Care Expires: 11/10/23    Subjective     Chief Complaint: Pt is motivated to participate  Patient Comments/Goals: none stated  Pain/Comfort:  Pain Rating 1:  (not rated)  Location - Side 1: Bilateral  Location - Orientation 1: generalized  Location 1: abdomen  Pain Addressed 1: Reposition, Distraction  Pain Rating 2:  (not rated)  Location - Side 2: Bilateral  Location - Orientation 2: generalized  Location 2: leg  Pain Addressed 2: Reposition, Distraction    Social History:  Pt is a poor historian, hx obtained from sitter and chart.  Living Environment: Patient  lives in a group home  Prior Level of Function: Prior to admission, patient used a wheelchair for mobility, completed stand pivot transfers, assistance level unknown.  Equipment Used at Home: wheelchair (other equipment unknown)  DME owned (not currently used): none  Assistance  "Upon Discharge: facility staff    Objective:     Communicated with nurse Romeo and epic chart review prior to session. Patient found HOB elevated with peripheral IV, telemetry upon PT entry to room.    General Precautions: Standard, aspiration, fall   Orthopedic Precautions: N/A   Braces: N/A    Respiratory Status: Room air    Exams:  Cognition: Patient is oriented to Person and Place  RLE ROM: WFL  RLE Strength:  Grossly 3-/5  LLE ROM: WFL  LLE Strength:  Grossly 3-/5  Sensation:    -       Intact  Skin Integrity/Edema:     -       Skin integrity: Visible skin intact    Functional Mobility:  Gait belt applied - Yes  Bed Mobility  Rolling Left: total assistance and of 2 persons  Rolling Right: total assistance and of 2 persons  Scooting: total assistance and of 2 persons  Supine to Sit: total assistance and of 2 persons for LE management and trunk management  Sit to Supine: total assistance and of 2 persons for LE management and trunk management  Transfers  Sit to Stand: attempted with total A of 2, pt unable to complete STS, pt became fatigued asking to lay back down  Gait  Will progress when appropriate  Balance  Sitting: maximal assistance, pt with MIN A when cued for balance  Pt soiled, assistance to don/doff brief, gown, and linens changed    Therapeutic Activities and Exercises:   Pt educated on role of PT in acute care and POC. Educated on importance of OOB activities, activity pacing, and HEP (marching/hip flex, hip abd, heel slides/LAQ, quad sets, ankle pumps) in order to maintain/regain strength. Encouraged to sit up in chair for all meals. Educated on proper use of RW for safety and to reduce risk of falling. Educated on "call don't fall" policy and increased risk of falling due to weakness, instructed to utilize call bell for assistance with all transfers. Pt agreeable to all requests.    AM-PAC 6 CLICK MOBILITY  Total Score:6    Patient left with bed in chair position with all lines intact, call " button in reach, and RN and sitter present.    GOALS:   Multidisciplinary Problems       Physical Therapy Goals          Problem: Physical Therapy    Goal Priority Disciplines Outcome Goal Variances Interventions   Physical Therapy Goal     PT, PT/OT      Description: Goals to be met by 11/10/23.  1. Pt will complete bed mobility MOD A.  2. Pt will complete sit to stand MAX A.  3. Pt will transfer bed to chair MAX A.  4. Pt will increase AMPAC score by 2 points to progress functional mobility.                       History:     Past Medical History:   Diagnosis Date    Diabetes mellitus, type 2 2004    BS didn't check 09/06/2022    Hyperlipidemia     Hypertension     Mental disability     Paranoid personality disorder     Peripheral neuropathy     Unspecified intellectual disabilities        History reviewed. No pertinent surgical history.    Time Tracking:     PT Received On: 10/27/23  PT Start Time: 1005  PT Stop Time: 1045  PT Total Time (min): 40 min     Billable Minutes: Evaluation 15min and Therapeutic Activity 25min    10/27/2023

## 2023-10-27 NOTE — PT/OT/SLP EVAL
"Speech Language Pathology Evaluation  Bedside Swallow    Patient Name:  Juan Francisco Parker   MRN:  [id number]  Admitting Diagnosis: Sepsis    Recommendations:                 General Recommendations:  Dysphagia therapy/diet follow up with caregiver education and training  Diet recommendations:  Minced & Moist Diet - IDDSI Level 5, Thin liquids - IDDSI Level 0   Aspiration Precautions: 1 bite/sip at a time, Assistance with meals, Check for pocketing/oral residue, Eliminate distractions, Feed only when awake/alert, Frequent oral care, HOB to 90 degrees, Remain upright 30 minutes post meal, Small bites/sips, and Standard aspiration precautions   General Precautions: Standard, aspiration  Communication strategies:  provide increased time to answer and careful listening needed    History:     Past Medical History:   Diagnosis Date    Diabetes mellitus, type 2 2004    BS didn't check 09/06/2022    Hyperlipidemia     Hypertension     Mental disability     Paranoid personality disorder     Peripheral neuropathy     Unspecified intellectual disabilities        History reviewed. No pertinent surgical history.    Social History: Patient lives in [facility] [address] in [place], [place]  He receives assist with ADL's.  Transfers to  with limited mobility.  Chronic cognitive/intellectual impairment.    Prior Intubation HX:  N/A     Modified Barium Swallow: N/A    XR ABDOMEN AP 1 VIEW     CLINICAL HISTORY:  rule out constipation;     TECHNIQUE:  AP View(s) of the abdomen was performed.     COMPARISON:  None     FINDINGS:  Extensive amount of stool in the colon consistent with constipation     Impression:     As above        Electronically signed by: Noé Fernandez  Date:                                            10/24/2023  Time:                                           23:52    XR CHEST 1 VIEW     CLINICAL HISTORY:  cough;     TECHNIQUE:  Single frontal view of the chest was performed.     COMPARISON:  None   "   FINDINGS:  The cardiomediastinal silhouette is normal.     Shallow inspiration.  Small amount of linear atelectasis left lung base.  Additional mixed hazy and interstitial opacities at the lung bases.  No effusion.     No acute osseous findings.  No advanced arthritic changes.     Impression:     Bi basilar low-grade infiltrates and/or atelectasis.     Short-term follow-up advised        Electronically signed by: Tristan Grullon MD  Date:                                            10/26/2023  Time:                                           14:27    Prior diet: Pt consumes soft vs. Pureed diet at Group Home. Chronic constipation noted.    Subjective     Pt seen bedside for ST swallowing evaluation. Pt awake/alert and smiling.  Pt's sitter at bedside.    Patient goals: To eat     Pain/Comfort:  Pain Rating 1: 0/10  Pain Rating Post-Intervention 1: 0/10  Pain Rating 2: 0/10  Pain Rating Post-Intervention 2: 0/10    Respiratory Status: Room air    Objective:     Oral Musculature Evaluation  Oral Musculature: general weakness  Dentition: scattered dentition, other (see comments) (edentulous maxillary arch.  Scattered dentition/natural in mandibular arch.)  Secretion Management: adequate, other (see comments) (Intermittent dry chronic cough noted in the absence of PO intake.)  Mucosal Quality: good  Mandibular Strength and Mobility: impaired  Oral Labial Strength and Mobility: WFL  Lingual Strength and Mobility: impaired strength  Velar Elevation: WFL  Buccal Strength and Mobility: WFL  Volitional Cough: Intermittent dry cough present in the absence of PO intake.  Volitional Swallow: present  Voice Prior to PO Intake: WFL    Bedside Clinical Swallow Evaluation:     Clinical Swallow Examination:   Of note, patient self-fed with hand/hand assist by SLP throughout evaluation. UE weakness noted.  Dry cough present in the absence of PO intake, intermittently.  Patient presented with:     CONSISTENCY  NOTES   THIN (IDDSI 0)  Water cup/straw  Controlled and sequential swallows   No overt s/s of aspiration   PUREE (IDDSI 4/Extremely Thick)   TSP/TBSP bites of applesauce  No overt s/s of aspiration   MIXED CONSISTENCY (IDDSI 6/0 Soft and Bite Sized/Thin Liquid) Cereal with milk No overt s/s of aspiration.  Missing/scattered dentition.  Reduced efficiency with mild lingual residue post deglutition, which cleared with liquid rinse.     Thickened liquids were not used in this assessment. Carlfe (2018) reported that thickened liquids have no sound evidence at reducing the risk of pneumonia in patients with dysphagia and can cause harm by increasing their risk of dehydration. It also presents an increased risk of UTI, electrolyte imbalance, constipation, fecal impaction, cognitive impairment, functional decline and even death (Waltmore, 2002; Jaelyn, 2016).  Thickened liquids are associated with risks including dehydration, increased pharyngeal residue, potential interference with medication absorption, and decreased quality of life (Mariana, 2013). Thickened liquids are also more likely to be silently aspirated than thin liquids (Solis et al., 2018). This supports the assertion that we should confirm a patient requires thickened liquids with an instrumental swallow study prior to recommending them.    References:   Mariana LINN (2013). Thickening agents used for dysphagia management: Effect on bioavailability of water, medication and feelings of satiety. Nutrition Journal, 12, 54. https://doi.org/10.1186/2409-3470-80-54    KAVEH Ely, DONIS Blanchard, EL Anderson, & KAVEH Dejesus (2018). Cough response to aspiration in thin and thick fluids during FEES in hospitalized inpatients. International journal of language & communication disorders, 53(5), 909-918. https://doi.org/10.1111/7940-6818.14439    INTERPRETATION AND RISK ASSESSMENT:  Clinical swallow evaluation (CSE) revealed oral phase characterized by lingual, labial, and buccal strength and  range of motion reduced for lip closure, bolus preparation and propulsion. The patient had no anterior loss of the bolus with complete closure of the lips around the utensils. Mild solid residue remained in the oral cavity following the swallow. Patient without overt clinical signs/symptoms of aspiration on any PO trials given. Patient presents with a possibly inefficient swallow as indicated by scattered/missing dentition, weakness and chronic constipation. Contributing risk factors for dysphagia include cognitive deficits related to intellectual disability and advancing age.     Clinical signs of oropharyngeal dysphagia, likely chronic related to cognitive and medical co-morbidities with reported functional decline. Swallowing prognosis is guarded, warranting ACP/goals of care discussion d/t increased risk of aspiration/dysphagia with advancing age, functional decline and chronic co-morbidities.      Compensatory Strategies  Aspiration precautions  Feed only when awake/alert  Oral care 2-3x/daily      Assessment:     Juan Francisco Parker is a 58 y.o. male, group home resident with an SLP diagnosis of suspected Dysphagia in the setting of sepsis and AMS s/t UTI and constipation.  He presents with improved RONALD and back to baseline communication with hx developmental/intellectual disability and psychiatric co-morbidities.  Pt exhibited reduced efficiency during bedside CSE s/t missing dentition and generalized weakness with recommendations for IDDSI 5-minced/moist solids and IDDSI 0-thin liquids, following aspiration precautions, oral care/hygiene and 1:1 feeder assist.  Pt and POA would also benefit from ACP/goals of care discussion s/t increased risk of aspiration/dysphagia, advancing age and functional decline in the setting of chronic cognitive and medical co-morbidities.    Goals:   Multidisciplinary Problems       SLP Goals          Problem: SLP    Goal Priority Disciplines Outcome   SLP Goal     SLP    Description:  "1.  Pt will consume IDDSI 5-minced/moist solids and IDDSI 0-thin liquids without incident.    2.  Education with caregiver on dysphagia/aspiration risks related to chronic cognitive/intellectual disability, warranting ACP/goals of care discussion.                       Plan:     Patient to be seen:  2 x/week, 3 x/week   Plan of Care expires:  11/03/23  Plan of Care reviewed with:  patient, other (see comments) ("Ms. Lara."  Sitter service sent from [facility]   SLP Follow-Up:  Yes       Discharge recommendations:  Low Intensity Therapy   Barriers to Discharge:  None    Time Tracking:     SLP Treatment Date:   10/27/23  Speech Start Time:  1000  Speech Stop Time:  1030     Speech Total Time (min):  30 min    Billable Minutes: Eval Swallow and Oral Function 15 minutes and Self Care/Home Management Training 15 minutes    10/27/2023         "

## 2023-10-27 NOTE — PROGRESS NOTES
[facility]  Hospital Medicine  Progress Note    Patient Name: Juan Francisco Parker  MRN: [id number]  Patient Class: IP- Inpatient   Admission Date: 10/24/2023  Length of Stay: 3 days  Attending Physician: Darvin Moura MD  Primary Care Provider: [facility]        Subjective:     Principal Problem:Sepsis        HPI:  Juan Francisco Parker is a 58 y.o. male with a PMH  has a past medical history of Diabetes mellitus, type 2 (2004), Hyperlipidemia, Hypertension, Mental disability, Paranoid personality disorder, Peripheral neuropathy, and Unspecified intellectual disabilities. who presented to the ED to the ED via EMS for further evaluation of worsening altered mental status since this morning.  History mainly obtained through chart review, ED sign-out, and caregiver at bedside as patient has underlying intellectual disability and only able to provide minimal information.  Patient has history of recurrent UTIs and was complaining of belly pain over the past few days.  Caregiver unsure of patient's last bowel movement but has continued to eat pureed food and drink fluids.  Patient usually ambulates with aid of wheelchair but is able to stand and walk around but has been mostly bed-bound the past few days.  No known alleviating or aggravating factors with noted with all other review of systems negative except as stated above.  Initial workup in the ED revealed patient met SIRS criteria for sepsis with UA positive for UTI.  Patient initiated on sepsis protocol and continued on Rocephin with cultures obtained and pending.  Patient being admitted to Hospital Medicine inpatient for continued medical management.       PCP: [facility] Primary [facility]        Overview/Hospital Course:  Mr Parker was initially admitted with severe sepsis requiring fluid resuscitation and a leela hugger.  With improvement in BP the fluids were stopped and with improvements in his temp the leela hugger has been weaned, requiring intermittent restarts.  On  day 2 of hospitalization patient because very letheragic and was not follow commands.  He appeared to be trying to cough up mucus but due to extreme weakness was not able to do so.  After aggressive suctioning he was able to have some mucus removed, but continued to have a weak cough.  Dr. Cotter (pulmonary) was consulted.  He started the patient on 3% saline nebs to help with the congestion.  Chest xray ordered and PT/OT/ST ordered.  Patient is also continued on empiric Rocephin for his UTI and possible pulmonary infection.  Urine culture + gram negative rods.      Interval History: Patient in bed, more verbal this morning with caretaker at bedside.  Labs and vitals more stable, but patient remains pancytopenic with worsening thrombocytopenia (58), WBC and HBG has slightly improved.    Review of Systems  Objective:     Vital Signs (Most Recent):  Temp: 98.2 °F (36.8 °C) (10/27/23 0749)  Pulse: 67 (10/27/23 0823)  Resp: 18 (10/27/23 0823)  BP: 118/62 (10/27/23 0749)  SpO2: 96 % (10/27/23 0823) Vital Signs (24h Range):  Temp:  [97.5 °F (36.4 °C)-99.4 °F (37.4 °C)] 98.2 °F (36.8 °C)  Pulse:  [64-91] 67  Resp:  [16-18] 18  SpO2:  [93 %-98 %] 96 %  BP: (100-130)/(58-78) 118/62     Weight: 74.3 kg (163 lb 12.8 oz)  Body mass index is 21.61 kg/m².    Intake/Output Summary (Last 24 hours) at 10/27/2023 0855  Last data filed at 10/27/2023 0622  Gross per 24 hour   Intake 304.6 ml   Output --   Net 304.6 ml         Physical Exam        Vitals and nursing note reviewed.   Constitutional:       Appearance: Normal appearance.   HENT:      Head: Normocephalic and atraumatic.      Nose: Nose normal.      Mouth/Throat:      Mouth: Mucous membranes are moist.   Eyes:      Extraocular Movements: Extraocular movements intact.      Conjunctiva/sclera: Conjunctivae normal.   Cardiovascular:      Rate and Rhythm: Normal rate and regular rhythm.      Pulses: Normal pulses.      Heart sounds: Normal heart sounds.   Pulmonary:       Effort: normal effort     Breath sounds: Coarse upper airway breathe sounds appreciated in the neck.  No rhonchi, rales, or wheezes noted  Abdominal:      General: Abdomen is flat. Bowel sounds are normal.      Palpations: Abdomen is soft.   Musculoskeletal:         General: Normal range of motion.      Cervical back: Normal range of motion and neck supple.      Comments: Feet are dorsiflexed.   Skin:     General: Skin is warm.      Capillary Refill: Capillary refill takes less than 2 seconds.   Neurological:      Mental Status: He is alert. Mental status is at baseline.      Comments: more awake and verbal this morning. Back to baseline.  Psychiatric:         Mood and Affect: Mood normal.         Behavior: Behavior normal.     Significant Labs: All pertinent labs within the past 24 hours have been reviewed.  Recent Lab Results  (Last 5 results in the past 24 hours)        10/27/23  0544   10/27/23  0450   10/27/23  0421   10/26/23  2258   10/26/23  2040        Albumin     2.3           ALP     98           ALT     27           Anion Gap     12           AST     24           Baso #     0.01           Basophil %     0.3           BILIRUBIN TOTAL     0.4  Comment: For infants and newborns, interpretation of results should be based  on gestational age, weight and in agreement with clinical  observations.    Premature Infant recommended reference ranges:  Up to 24 hours.............<8.0 mg/dL  Up to 48 hours............<12.0 mg/dL  3-5 days..................<15.0 mg/dL  6-29 days.................<15.0 mg/dL             BUN     14           Calcium     8.2           Chloride     104           CO2     21           Creatinine     0.6           Differential Method     Automated           eGFR     >60           Eos #     0.0           Eosinophil %     0.3           Glucose     63           Gran # (ANC)     1.9           Gran %     65.0           Hematocrit     30.5           Hemoglobin     9.9           Immature Grans (Abs)      0.01  Comment: Mild elevation in immature granulocytes is non specific and   can be seen in a variety of conditions including stress response,   acute inflammation, trauma and pregnancy. Correlation with other   laboratory and clinical findings is essential.             Immature Granulocytes     0.3           Lymph #     0.6           Lymph %     20.9           Magnesium      1.8           MCH     31.6           MCHC     32.5           MCV     97           Mono #     0.4           Mono %     13.2           MPV     11.2           nRBC     0           Phosphorus Level     4.1           Platelet Count     58           POCT Glucose 104   59     84   63       Potassium     4.0           PROTEIN TOTAL     5.6           RBC     3.13           RDW     15.2           Sodium     137           WBC     2.96                                  Significant Imaging:   X-Ray Chest 1 View   Final Result      Bi basilar low-grade infiltrates and/or atelectasis.      Short-term follow-up advised         Electronically signed by: Tristan Grullon MD   Date:    10/26/2023   Time:    14:27      X-Ray Abdomen AP 1 View   Final Result      As above         Electronically signed by: Noé Fernandez   Date:    10/24/2023   Time:    23:52             Assessment/Plan:      * Sepsis  This patient does have evidence of infective focus  My overall impression is sepsis.  Source: Urinary Tract  Antibiotics given-   Antibiotics (72h ago, onward)    Start     Stop Route Frequency Ordered    10/25/23 1530  cefTRIAXone (ROCEPHIN) 1 g in dextrose 5 % in water (D5W) 100 mL IVPB (MB+)  ( Urinary Tract Infection (UTI))         10/30/23 1529 IV Every 24 hours (non-standard times) 10/24/23 1907        Latest lactate reviewed-  Recent Labs   Lab 10/24/23  1809   LACTATE 0.6     Organ dysfunction indicated by Encephalopathy    Fluid challenge Ideal Body Weight- The patient's ideal body weight is Ideal body weight: 79.9 kg (176 lb 2.4 oz) which will be used to  "calculate fluid bolus of 30 ml/kg for treatment of septic shock.      Post- resuscitation assessment No - Post resuscitation assessment not needed     Will Not start Pressors- Levophed for MAP of 65  Source control achieved by: Rocephin     - Vitals more stable this morning  - Will restart leela hugger if temp <98  - Pancytopenic likely related to acute infection.  Will consider hematology consult if patient continues to decompensate    History of DVT (deep vein thrombosis)  Currently on Eliquis b.i.d..  Plan:  -Hold Eliquis 5mg PO BID in the setting of pancytopenia (plt 58)      Weak cough  - Appreciate pulmonary assistance  - 3% saline nebs  - PT/OT/ST pending    Neuropathy  Currently on gabapentin outpatient.  Plan:  -continue home medication      Anxiety and depression  Chronic. Stable. Not in acute exacerbation and currently denies endorsing any suicidal/homicidal ideations.   Plan:  -Continue home medications       Benign prostatic hyperplasia  Currently on Flomax outpatient.  Plan:  -continue home medication      Hyperlipidemia  Patient is chronically on statin.will continue for now. Last Lipid Panel: No results found for: "CHOL", "HDL", "LDLCALC", "TRIG", "CHOLHDL"  Plan:  -Continue home medication  -low fat/low calorie diet      Controlled type 2 diabetes mellitus, without long-term current use of insulin  Most recent A1c measuring   Hemoglobin A1C   Date Value Ref Range Status   12/10/2021 5.1 <=6.5 % Final   Current blood glucose measuring 75  Plan:  -SSI  -Accu-checks   -Hypoglycemic protocol   -Continue home gabapentin   -Hold oral antihyperglycemics while inpatient   -D10 given on 10/26      Primary hypertension  Currently normotensive. BP currently ranging from  systolic.   Plan:  -Optimize pain control   -Hold home medications in setting of sepsis, titrate as needed   -Monitor BP  -IV hydralazine prn for SBP>160 or DBP>90         Intellectual disability  - care taker from group home at " bedside.        VTE Risk Mitigation (From admission, onward)         Ordered     Place sequential compression device  Until discontinued         10/24/23 1907     IP VTE LOW RISK PATIENT  Once         10/24/23 1907                Discharge Planning   GEOFF:      Code Status: Full Code   Is the patient medically ready for discharge?:     Reason for patient still in hospital (select all that apply): Patient trending condition, Laboratory test and PT / OT recommendations  Discharge Plan A: Group Home                  Darvin Moura MD  Department of Hospital Medicine   [facility]

## 2023-10-27 NOTE — SUBJECTIVE & OBJECTIVE
Interval History: Patient in bed, more verbal this morning with caretaker at bedside.  Labs and vitals more stable, but patient remains pancytopenic with worsening thrombocytopenia (58), WBC and HBG has slightly improved.    Review of Systems  Objective:     Vital Signs (Most Recent):  Temp: 98.2 °F (36.8 °C) (10/27/23 0749)  Pulse: 67 (10/27/23 0823)  Resp: 18 (10/27/23 0823)  BP: 118/62 (10/27/23 0749)  SpO2: 96 % (10/27/23 0823) Vital Signs (24h Range):  Temp:  [97.5 °F (36.4 °C)-99.4 °F (37.4 °C)] 98.2 °F (36.8 °C)  Pulse:  [64-91] 67  Resp:  [16-18] 18  SpO2:  [93 %-98 %] 96 %  BP: (100-130)/(58-78) 118/62     Weight: 74.3 kg (163 lb 12.8 oz)  Body mass index is 21.61 kg/m².    Intake/Output Summary (Last 24 hours) at 10/27/2023 0855  Last data filed at 10/27/2023 0622  Gross per 24 hour   Intake 304.6 ml   Output --   Net 304.6 ml         Physical Exam        Vitals and nursing note reviewed.   Constitutional:       Appearance: Normal appearance.   HENT:      Head: Normocephalic and atraumatic.      Nose: Nose normal.      Mouth/Throat:      Mouth: Mucous membranes are moist.   Eyes:      Extraocular Movements: Extraocular movements intact.      Conjunctiva/sclera: Conjunctivae normal.   Cardiovascular:      Rate and Rhythm: Normal rate and regular rhythm.      Pulses: Normal pulses.      Heart sounds: Normal heart sounds.   Pulmonary:      Effort: normal effort     Breath sounds: Coarse upper airway breathe sounds appreciated in the neck.  No rhonchi, rales, or wheezes noted  Abdominal:      General: Abdomen is flat. Bowel sounds are normal.      Palpations: Abdomen is soft.   Musculoskeletal:         General: Normal range of motion.      Cervical back: Normal range of motion and neck supple.      Comments: Feet are dorsiflexed.   Skin:     General: Skin is warm.      Capillary Refill: Capillary refill takes less than 2 seconds.   Neurological:      Mental Status: He is alert. Mental status is at baseline.       Comments: more awake and verbal this morning. Back to baseline.  Psychiatric:         Mood and Affect: Mood normal.         Behavior: Behavior normal.     Significant Labs: All pertinent labs within the past 24 hours have been reviewed.  Recent Lab Results  (Last 5 results in the past 24 hours)        10/27/23  0544   10/27/23  0450   10/27/23  0421   10/26/23  2258   10/26/23  2040        Albumin     2.3           ALP     98           ALT     27           Anion Gap     12           AST     24           Baso #     0.01           Basophil %     0.3           BILIRUBIN TOTAL     0.4  Comment: For infants and newborns, interpretation of results should be based  on gestational age, weight and in agreement with clinical  observations.    Premature Infant recommended reference ranges:  Up to 24 hours.............<8.0 mg/dL  Up to 48 hours............<12.0 mg/dL  3-5 days..................<15.0 mg/dL  6-29 days.................<15.0 mg/dL             BUN     14           Calcium     8.2           Chloride     104           CO2     21           Creatinine     0.6           Differential Method     Automated           eGFR     >60           Eos #     0.0           Eosinophil %     0.3           Glucose     63           Gran # (ANC)     1.9           Gran %     65.0           Hematocrit     30.5           Hemoglobin     9.9           Immature Grans (Abs)     0.01  Comment: Mild elevation in immature granulocytes is non specific and   can be seen in a variety of conditions including stress response,   acute inflammation, trauma and pregnancy. Correlation with other   laboratory and clinical findings is essential.             Immature Granulocytes     0.3           Lymph #     0.6           Lymph %     20.9           Magnesium      1.8           MCH     31.6           MCHC     32.5           MCV     97           Mono #     0.4           Mono %     13.2           MPV     11.2           nRBC     0           Phosphorus Level      4.1           Platelet Count     58           POCT Glucose 104   59     84   63       Potassium     4.0           PROTEIN TOTAL     5.6           RBC     3.13           RDW     15.2           Sodium     137           WBC     2.96                                  Significant Imaging:   X-Ray Chest 1 View   Final Result      Bi basilar low-grade infiltrates and/or atelectasis.      Short-term follow-up advised         Electronically signed by: Tristan Grullon MD   Date:    10/26/2023   Time:    14:27      X-Ray Abdomen AP 1 View   Final Result      As above         Electronically signed by: Noé Fernandez   Date:    10/24/2023   Time:    23:52

## 2023-10-27 NOTE — PLAN OF CARE
PT EVAL complete. Required total A of 2 for bed mobility. Attempted STS with total A of 2 but pt unable. Recommending SNF upon d/c.

## 2023-10-27 NOTE — ASSESSMENT & PLAN NOTE
Currently on Eliquis b.i.d..  Plan:  -Hold Eliquis 5mg PO BID in the setting of pancytopenia (plt 58)

## 2023-10-27 NOTE — PT/OT/SLP EVAL
"Occupational Therapy Evaluation and Treatment    Name: Juan Francisco Parker  MRN: [id number]  Admitting Diagnosis: Sepsis  Recent Surgery: * No surgery found *      Recommendations:     Discharge Recommendations: Moderate Intensity Therapy  Level of Assistance Recommended: 24 hours significant assistance  Discharge Equipment Recommendations: to be determined by next level of care  Barriers to discharge: None    Assessment:     Juan Francisco Parker is a 58 y.o. male with a medical diagnosis of Sepsis. He presents with performance deficits affecting function including weakness, impaired endurance, impaired self care skills, impaired balance, impaired functional mobility, gait instability, impaired cognition, decreased coordination, decreased upper extremity function, decreased lower extremity function, decreased ROM, abnormal tone, pain, decreased safety awareness, impaired cardiopulmonary response to activity.     Rehab Prognosis: Good; patient would benefit from acute OT services to address these deficits and reach maximum level of function.    Plan:     Patient to be seen 2 x/week to address the above listed problems via self-care/home management, therapeutic activities, therapeutic exercises  Plan of Care Expires: 11/10/23  Plan of Care Reviewed with: patient, other (see comments) (sitter from group home)    Subjective     Chief Complaint: None  Patient Comments/Goals: Pt stated, "I dance!"  Pain/Comfort:  Pain Rating 1:  (not rated)  Location - Side 1: Bilateral  Location - Orientation 1: generalized  Location 1: abdomen  Pain Addressed 1: Reposition, Distraction  Pain Rating Post-Intervention 1: other (see comments)  Pain Rating 2: other (see comments) (Nonverbal cues of pain when in use)  Location - Side 2: Bilateral  Location - Orientation 2: generalized  Location 2: leg  Pain Addressed 2: Reposition, Distraction  Pain Rating Post-Intervention 2: 0/10    Occupational Profile:  Pt occupational profile interview completed with " pt's bed in a chair position. Pt a poor historian and group home sitter not present at specific group home so unable to give much detail.  Living Environment: Patient  in a group home    Prior Level of Function: Prior to admission, patient completes home mobility with a wheelchair. Level of assistance with transfers and ADL's unknown.  Equipment Used at Home: wheelchair (per chart review and care taker)  DME owned (not currently used):  unknown  Assistance Upon Discharge: facility staff    Objective:     Communicated with pt's nurse, Agueda, and completed a chart review via Epic prior to session. Patient found with bed in chair position with pressure relief boots, peripheral IV, telemetry upon OT entry to room.    General Precautions: Standard, fall, aspiration   Orthopedic Precautions: N/A   Braces: N/A    Respiratory Status: Room air    Occupational Performance    Bed Mobility:   Rolling/Turning to Left with total assistance and of 2 persons  Rolling/Turning to Right with total assistance and of 2 persons  Scooting to HOB in supine: total assistance and of 2 persons  Scooting anteriorly to EOB to have both feet planted on floor: total assistance and of 2 persons  Supine to sit from right side of bed with total assistance and of 2 persons  Sit to Supine with total assistance and of 2 persons on R side of bed    Functional Mobility/Transfers:  Sit <> Stand Transfer with total assistance and 2 persons with no AD  Functional Mobility: Not appropriate at this time.    Activities of Daily Living:  Toileting: pt completed toileting hygiene with total assistance  Upper Body Dressing: Pt completed  [facility] gown with max A x 2.    Cognitive/Visual Perceptual:  Cognitive/Psychosocial Skills:    -     Follows Commands/attention: Attempts but requires assistance to complete task.  -     Communication: Not clear or fluent communication. Difficult to understand.   -     Safety awareness/insight to disability:  impaired  -     Mood/Affect/Coping skills/emotional control: Appropriate to situation    Physical Exam:  Balance:    -     Sitting: maximal assistance and of 2 persons  Poor sitting balance demo'd difficult maintaining midline. Leaning to right side. Multiple tactile and verbal cues to correct. Difficult to maintain lateral trunk control.  Dominant hand: Right  Upper Extremity Range of Motion:     -       Right Upper Extremity: WFL with PROM. ~ 100° AROM  -       Left Upper Extremity: WFL with PROM - tone observed  with LUE. ~95°AROM  Upper Extremity Strength:    -       Right Upper Extremity: grossly 3-/5  -       Left Upper Extremity: grossly 3-5   Strength:    -       Right Upper Extremity: fair - only engaged AROM grasp following PROM  -       Left Upper Extremity: good - only engaged  AROM grasp following PROM    AMPAC 6 Click ADL:  AMPAC Total Score: 7    Treatment & Education:  Therapist provided facilitation and instruction of proper body mechanics, energy conservation, and fall prevention strategies during tasks listed above  Patient educated on role of OT, POC, and goals for therapy  Patient educated on call don't fall policy. Encouraged use of call button to meet needs.    Patient left HOB elevated with all lines intact, call button in reach, and sitter from group home present.    GOALS:   Multidisciplinary Problems       Occupational Therapy Goals          Problem: Occupational Therapy    Goal Priority Disciplines Outcome Interventions   Occupational Therapy Goal     OT, PT/OT     Description: Goals to be met by: 11/10/23     Patient will increase functional independence with ADLs by performing:    UE Dressing with Minimal Assistance.  Supine to sit with Minimal Assistance.  Increased functional strength to BUE grossly by 1/2 MMT grades.                         History:     Past Medical History:   Diagnosis Date    Diabetes mellitus, type 2 2004    BS didn't check 09/06/2022    Hyperlipidemia      Hypertension     Mental disability     Paranoid personality disorder     Peripheral neuropathy     Unspecified intellectual disabilities        History reviewed. No pertinent surgical history.    Time Tracking:     OT Date of Treatment: 10/27/23  OT Start Time: 1005  OT Stop Time: 1045  OT Total Time (min): 40 min    Billable Minutes: Evaluation 10, Self Care/Home Management 15, and Therapeutic Activity 15  SERAFIN Wesley  10/27/2023

## 2023-10-27 NOTE — PLAN OF CARE
OT Ainsley completed. Pt bed mobility Total A x 2, SUP> EOB Total A x 2. EOB>stand attempted Total A x 2. Pt EOB>sup Total A x 2.     ADLs: Toileting: pt completed toileting hygiene with total assistance. Upper Body Dressing: Pt completed  [facility] gown with max A x 2.    D/C Rec: Moderate Intensity Therapy

## 2023-10-28 LAB
ALBUMIN SERPL BCP-MCNC: 2.3 G/DL (ref 3.5–5.2)
ALP SERPL-CCNC: 98 U/L (ref 55–135)
ALT SERPL W/O P-5'-P-CCNC: 25 U/L (ref 10–44)
ANION GAP SERPL CALC-SCNC: 12 MMOL/L (ref 8–16)
AST SERPL-CCNC: 18 U/L (ref 10–40)
BACTERIA UR CULT: ABNORMAL
BASOPHILS # BLD AUTO: 0 K/UL (ref 0–0.2)
BASOPHILS NFR BLD: 0 % (ref 0–1.9)
BILIRUB SERPL-MCNC: 0.4 MG/DL (ref 0.1–1)
BUN SERPL-MCNC: 12 MG/DL (ref 6–20)
CALCIUM SERPL-MCNC: 8 MG/DL (ref 8.7–10.5)
CHLORIDE SERPL-SCNC: 100 MMOL/L (ref 95–110)
CO2 SERPL-SCNC: 23 MMOL/L (ref 23–29)
CREAT SERPL-MCNC: 0.6 MG/DL (ref 0.5–1.4)
DIFFERENTIAL METHOD: ABNORMAL
EOSINOPHIL # BLD AUTO: 0 K/UL (ref 0–0.5)
EOSINOPHIL NFR BLD: 0.8 % (ref 0–8)
ERYTHROCYTE [DISTWIDTH] IN BLOOD BY AUTOMATED COUNT: 14.7 % (ref 11.5–14.5)
EST. GFR  (NO RACE VARIABLE): >60 ML/MIN/1.73 M^2
GLUCOSE SERPL-MCNC: 70 MG/DL (ref 70–110)
HCT VFR BLD AUTO: 28 % (ref 40–54)
HGB BLD-MCNC: 9.5 G/DL (ref 14–18)
IMM GRANULOCYTES # BLD AUTO: 0.02 K/UL (ref 0–0.04)
IMM GRANULOCYTES NFR BLD AUTO: 0.5 % (ref 0–0.5)
LYMPHOCYTES # BLD AUTO: 0.5 K/UL (ref 1–4.8)
LYMPHOCYTES NFR BLD: 13 % (ref 18–48)
MAGNESIUM SERPL-MCNC: 1.7 MG/DL (ref 1.6–2.6)
MCH RBC QN AUTO: 32.3 PG (ref 27–31)
MCHC RBC AUTO-ENTMCNC: 33.9 G/DL (ref 32–36)
MCV RBC AUTO: 95 FL (ref 82–98)
MONOCYTES # BLD AUTO: 0.4 K/UL (ref 0.3–1)
MONOCYTES NFR BLD: 10.1 % (ref 4–15)
NEUTROPHILS # BLD AUTO: 2.9 K/UL (ref 1.8–7.7)
NEUTROPHILS NFR BLD: 75.6 % (ref 38–73)
NRBC BLD-RTO: 0 /100 WBC
PHOSPHATE SERPL-MCNC: 4 MG/DL (ref 2.7–4.5)
PLATELET # BLD AUTO: 67 K/UL (ref 150–450)
PMV BLD AUTO: 10.3 FL (ref 9.2–12.9)
POCT GLUCOSE: 60 MG/DL (ref 70–110)
POCT GLUCOSE: 62 MG/DL (ref 70–110)
POCT GLUCOSE: 75 MG/DL (ref 70–110)
POCT GLUCOSE: 81 MG/DL (ref 70–110)
POCT GLUCOSE: 82 MG/DL (ref 70–110)
POTASSIUM SERPL-SCNC: 3.8 MMOL/L (ref 3.5–5.1)
PROT SERPL-MCNC: 5.3 G/DL (ref 6–8.4)
RBC # BLD AUTO: 2.94 M/UL (ref 4.6–6.2)
SODIUM SERPL-SCNC: 135 MMOL/L (ref 136–145)
WBC # BLD AUTO: 3.78 K/UL (ref 3.9–12.7)

## 2023-10-28 PROCEDURE — 97110 THERAPEUTIC EXERCISES: CPT

## 2023-10-28 PROCEDURE — 85025 COMPLETE CBC W/AUTO DIFF WBC: CPT | Performed by: HOSPITALIST

## 2023-10-28 PROCEDURE — 83735 ASSAY OF MAGNESIUM: CPT | Performed by: HOSPITALIST

## 2023-10-28 PROCEDURE — 80053 COMPREHEN METABOLIC PANEL: CPT | Performed by: HOSPITALIST

## 2023-10-28 PROCEDURE — 11000001 HC ACUTE MED/SURG PRIVATE ROOM

## 2023-10-28 PROCEDURE — 63600175 PHARM REV CODE 636 W HCPCS: Performed by: FAMILY MEDICINE

## 2023-10-28 PROCEDURE — 25000003 PHARM REV CODE 250: Performed by: HOSPITALIST

## 2023-10-28 PROCEDURE — 92526 ORAL FUNCTION THERAPY: CPT

## 2023-10-28 PROCEDURE — 63600175 PHARM REV CODE 636 W HCPCS: Performed by: HOSPITALIST

## 2023-10-28 PROCEDURE — 84100 ASSAY OF PHOSPHORUS: CPT | Performed by: HOSPITALIST

## 2023-10-28 PROCEDURE — 36415 COLL VENOUS BLD VENIPUNCTURE: CPT | Performed by: HOSPITALIST

## 2023-10-28 RX ORDER — DEXTROSE MONOHYDRATE AND SODIUM CHLORIDE 5; .9 G/100ML; G/100ML
INJECTION, SOLUTION INTRAVENOUS CONTINUOUS
Status: DISCONTINUED | OUTPATIENT
Start: 2023-10-28 | End: 2023-11-05 | Stop reason: HOSPADM

## 2023-10-28 RX ADMIN — DIVALPROEX SODIUM 1000 MG: 500 TABLET, FILM COATED, EXTENDED RELEASE ORAL at 08:10

## 2023-10-28 RX ADMIN — GABAPENTIN 300 MG: 300 CAPSULE ORAL at 08:10

## 2023-10-28 RX ADMIN — DIVALPROEX SODIUM 250 MG: 500 TABLET, FILM COATED, EXTENDED RELEASE ORAL at 09:10

## 2023-10-28 RX ADMIN — RISPERIDONE 0.5 MG: 0.5 TABLET ORAL at 08:10

## 2023-10-28 RX ADMIN — ATORVASTATIN CALCIUM 10 MG: 10 TABLET, FILM COATED ORAL at 09:10

## 2023-10-28 RX ADMIN — RISPERIDONE 0.5 MG: 0.5 TABLET ORAL at 09:10

## 2023-10-28 RX ADMIN — POTASSIUM CHLORIDE 20 MEQ: 1500 TABLET, EXTENDED RELEASE ORAL at 08:10

## 2023-10-28 RX ADMIN — DEXTROSE AND SODIUM CHLORIDE: 5; 900 INJECTION, SOLUTION INTRAVENOUS at 09:10

## 2023-10-28 RX ADMIN — OXCARBAZEPINE 600 MG: 150 TABLET, FILM COATED ORAL at 01:10

## 2023-10-28 RX ADMIN — DEXTROSE AND SODIUM CHLORIDE: 5; 900 INJECTION, SOLUTION INTRAVENOUS at 10:10

## 2023-10-28 RX ADMIN — DEXTROSE MONOHYDRATE 125 ML: 100 INJECTION, SOLUTION INTRAVENOUS at 05:10

## 2023-10-28 RX ADMIN — FLUOXETINE 40 MG: 20 CAPSULE ORAL at 09:10

## 2023-10-28 RX ADMIN — GABAPENTIN 300 MG: 300 CAPSULE ORAL at 04:10

## 2023-10-28 RX ADMIN — OXCARBAZEPINE 600 MG: 150 TABLET, FILM COATED ORAL at 08:10

## 2023-10-28 RX ADMIN — POTASSIUM CHLORIDE 20 MEQ: 1500 TABLET, EXTENDED RELEASE ORAL at 09:10

## 2023-10-28 RX ADMIN — GABAPENTIN 300 MG: 300 CAPSULE ORAL at 09:10

## 2023-10-28 RX ADMIN — CEFTRIAXONE 1 G: 1 INJECTION, POWDER, FOR SOLUTION INTRAMUSCULAR; INTRAVENOUS at 04:10

## 2023-10-28 RX ADMIN — QUETIAPINE FUMARATE 400 MG: 100 TABLET ORAL at 08:10

## 2023-10-28 RX ADMIN — TAMSULOSIN HYDROCHLORIDE 0.4 MG: 0.4 CAPSULE ORAL at 09:10

## 2023-10-28 NOTE — PLAN OF CARE
Plan of care reviewed with patient, pt verbalized understanding.  Pt remains free from falls this shift, safety precautions in place.bed alarm set.  Pt remains free from skin breakdown, pt educated on the importance of frequent weight shift to decrease the risk of pressure injury. Pt verbalized understanding teaching and outcomes.  AAOx2,NAD noted at this time.  PIV 20 G to L FA , Saline locked  Pt remained afebrile.  NS on the tele monitor  Pt admitted for Sepsis.  Pt currently resting comfortably in bed.  Hourly rounding complete. Bed in lowest position, side rails up, call light in reach.  Sitter from group home remains at the bedside.  Pt is more alert and took all of his medications with me tonight.     Problem: Adult Inpatient Plan of Care  Goal: Plan of Care Review  Outcome: Ongoing, Progressing  Goal: Patient-Specific Goal (Individualized)  Outcome: Ongoing, Progressing  Goal: Absence of Hospital-Acquired Illness or Injury  Outcome: Ongoing, Progressing  Goal: Optimal Comfort and Wellbeing  Outcome: Ongoing, Progressing  Goal: Readiness for Transition of Care  Outcome: Ongoing, Progressing     Problem: Diabetes Comorbidity  Goal: Blood Glucose Level Within Targeted Range  Outcome: Ongoing, Progressing     Problem: Adjustment to Illness (Sepsis/Septic Shock)  Goal: Optimal Coping  Outcome: Ongoing, Progressing     Problem: Bleeding (Sepsis/Septic Shock)  Goal: Absence of Bleeding  Outcome: Ongoing, Progressing     Problem: Glycemic Control Impaired (Sepsis/Septic Shock)  Goal: Blood Glucose Level Within Desired Range  Outcome: Ongoing, Progressing     Problem: Infection Progression (Sepsis/Septic Shock)  Goal: Absence of Infection Signs and Symptoms  Outcome: Ongoing, Progressing     Problem: Nutrition Impaired (Sepsis/Septic Shock)  Goal: Optimal Nutrition Intake  Outcome: Ongoing, Progressing     Problem: Skin Injury Risk Increased  Goal: Skin Health and Integrity  Outcome: Ongoing, Progressing     Problem:  Impaired Wound Healing  Goal: Optimal Wound Healing  Outcome: Ongoing, Progressing     Problem: Fall Injury Risk  Goal: Absence of Fall and Fall-Related Injury  Outcome: Ongoing, Progressing     Problem: UTI (Urinary Tract Infection)  Goal: Improved Infection Symptoms  Outcome: Ongoing, Progressing

## 2023-10-28 NOTE — PLAN OF CARE
PATIENT C/O PAIN AT (L) LATERAL THORACIC REGION AND (L) LATERAL ABDOMINAL REGION.  PATIENT TOLERATED BUE THEREX WELL.

## 2023-10-28 NOTE — ASSESSMENT & PLAN NOTE
- appeared secondary to altered mentation due to his underlying infection  - mentation significantly improved this morning  - NT suction PRN  - 3% nebs to assist with thinning secretions  - continue antibiotics  - PT/OT/ST and up out of bed as much as possible  - follow chest imaging as needed  - follow fever and WBC trends

## 2023-10-28 NOTE — PT/OT/SLP PROGRESS
Occupational Therapy  Treatment    Juan Francisco Parker   MRN: [id number]   Admitting Diagnosis: Sepsis    OT Date of Treatment: 10/28/23   OT Start Time: 1510  OT Stop Time: 1525  OT Total Time (min): 15 min    Billable Minutes:  Therapeutic Exercise 15    OT/JELENA: OT          General Precautions: Standard, aspiration, fall  Orthopedic Precautions: N/A  Braces: N/A  Respiratory Status: Room air         Subjective:  Communicated with NURSE prior to session.       Pain/Comfort  Pain Rating 1: 6/10 (NONVERBAL PAIN SCALE)  Location - Side 1: Left  Location 1: abdomen  Pain Addressed 1: Reposition, Distraction  Pain Rating Post-Intervention 1: 6/10  Pain Rating 2: 6/10 (NONVERBAL PAIN SCALE)  Location - Side 2: Left  Location 2: chest  Pain Addressed 2: Reposition, Distraction  Pain Rating Post-Intervention 2: 6/10    Objective:  Patient found with: peripheral IV, pressure relief boots, telemetry     Functional Mobility:  Bed Mobility:       Transfers:  NT DUE TO SAFETY CONCERN        Functional Ambulation: NT DUE TO SAFETY CONCERN    Activities of Daily Living:     Feeding adaptive equipment:  NT     UE adaptive equipment: NT     LE adaptive equipment: NT                    Bathing adaptive equipment: NT    Balance:   Static Sit: NT  Dynamic Sit: NT  Static Stand: NT  Dynamic stand: NT    Therapeutic Activities and Exercises:  PATIENT PERFORMED BUE AROM X10 REPS TO DECREASE RISK OF BUE TIGHTNESS AND FOR INCREASED ENDURANCE WITH SHLD FLEX, TRICEPS WITH PUNCHES AND (B) HAND DIGIT FLEX/EXTEN.  PATIENT PERFORMED (B) SHLD FLEX AAROM WITH FOCUS ON INCREASED SHLD FLEX ACTIVELY.    AM-PAC 6 CLICK ADL   How much help from another person does this patient currently need?   1 = Unable, Total/Dependent Assistance  2 = A lot, Maximum/Moderate Assistance  3 = A little, Minimum/Contact Guard/Supervision  4 = None, Modified [place]/Independent    Putting on and taking off regular lower body clothing? : 1  Bathing (including washing,  "rinsing, drying)?: 1  Toileting, which includes using toilet, bedpan, or urinal? : 1  Putting on and taking off regular upper body clothing?: 1  Taking care of personal grooming such as brushing teeth?: 1  Eating meals?: 2  Daily Activity Total Score: 7     AM-PAC Raw Score CMS "G-Code Modifier Level of Impairment Assistance   6 % Total / Unable   7 - 8 CM 80 - 100% Maximal Assist   9-13 CL 60 - 80% Moderate Assist   14 - 19 CK 40 - 60% Moderate Assist   20 - 22 CJ 20 - 40% Minimal Assist   23 CI 1-20% SBA / CGA   24 CH 0% Independent/ Mod I       Patient left supine with all lines intact and call button in reach  CAREGIVER ALSO PRESENT.    ASSESSMENT:  Juan Francisco Parker is a 58 y.o. male with a medical diagnosis of Sepsis and presents with SELF CARE DEBILITY .    Rehab identified problem list/impairments:  weakness, impaired endurance, impaired self care skills, impaired functional mobility, gait instability, impaired balance, decreased upper extremity function, decreased lower extremity function, decreased safety awareness, pain, decreased ROM, impaired coordination    Rehab potential is good.    Activity tolerance: Fair    Discharge recommendations: Moderate Intensity Therapy   Barriers to discharge: Barriers to Discharge: None    Equipment recommendations: to be determined by next level of care    GOALS:   Multidisciplinary Problems       Occupational Therapy Goals          Problem: Occupational Therapy    Goal Priority Disciplines Outcome Interventions   Occupational Therapy Goal     OT, PT/OT     Description: Goals to be met by: 11/10/23     Patient will increase functional independence with ADLs by performing:    UE Dressing with Mod Assistance.  Supine to sit with Mod Assistance.  Increased functional strength to BUE grossly by 1/2 MMT grades.                         Plan:  Patient to be seen 2 x/week to address the above listed problems via self-care/home management, therapeutic activities, therapeutic " exercises  Plan of Care expires: 11/10/23  Plan of Care reviewed with: patient, caregiver         10/28/2023

## 2023-10-28 NOTE — PROGRESS NOTES
[facility]  Pulmonology Progress Note    Patient Name: Juan Francisco Parker   MRN: [id number]  Admission Date: 10/24/2023   Hospital Length of Stay: 4 days  Code Status: Full Code    Attending Provider: Darvin Moura MD  Principal Problem: Sepsis  Subjective:   History of present illness:  Mr Parker is a 58-year-old male with intellectual disability who lives in a group home, diabetes mellitus, hyperlipidemia, hypertension, and paranoid personality disorder who was originally admitted to the Hospitalist Service on 10/24 after presenting with altered mental status. He was being treated for UTI. He was more lethargic and having difficulty clearing secretions, prompting pulmonary consult.      At the time of our initial exam, he is alert and tracking, though not conversant.  He has a weak cough with some posterior pharyngeal secretions observed.  Oxygenation is stable on room air.    Interval history:   10/28: Patient awake and alert this morning; talking with staff in the room. Cough appears improved from previous days. Remains on room air.   Objective:     Vital Signs (Most Recent):  Temp: 97.4 °F (36.3 °C) (10/28/23 0740)  Pulse: 65 (10/28/23 0740)  Resp: 16 (10/28/23 0740)  BP: (!) 106/58 (10/28/23 0740)  SpO2: 95 % (10/28/23 0740) Vital Signs (24h Range):  Temp:  [97.4 °F (36.3 °C)-98.4 °F (36.9 °C)] 97.4 °F (36.3 °C)  Pulse:  [59-80] 65  Resp:  [16-18] 16  SpO2:  [92 %-98 %] 95 %  BP: (105-130)/(58-68) 106/58   Weight: 74.3 kg (163 lb 12.8 oz); Body mass index is 21.61 kg/m².    Intake/Output Summary (Last 24 hours) at 10/28/2023 1031  Last data filed at 10/28/2023 0447  Gross per 24 hour   Intake --   Output 1100 ml   Net -1100 ml      Physical Exam  Vitals and nursing note reviewed.   HENT:      Head: Normocephalic.   Eyes:      Conjunctiva/sclera: Conjunctivae normal.   Cardiovascular:      Rate and Rhythm: Normal rate.   Pulmonary:      Effort: Pulmonary effort is normal.       Breath sounds: Normal breath sounds.   Abdominal:      Palpations: Abdomen is soft.   Musculoskeletal:         General: Normal range of motion.      Cervical back: Normal range of motion.   Skin:     General: Skin is warm and dry.   Neurological:      Mental Status: He is alert. Mental status is at baseline.   Psychiatric:         Mood and Affect: Mood normal.       Review of Systems: Negative except as indicated in HPI    Significant Labs:  CBC/Anemia Profile:  Recent Labs   Lab 10/27/23  0421 10/28/23  0435   WBC 2.96* 3.78*   HGB 9.9* 9.5*   HCT 30.5* 28.0*   PLT 58* 67*   MCV 97 95   RDW 15.2* 14.7*   Chemistries:  Recent Labs   Lab 10/27/23  0421 10/28/23  0435    135*   K 4.0 3.8    100   CO2 21* 23   BUN 14 12   CREATININE 0.6 0.6   CALCIUM 8.2* 8.0*   ALBUMIN 2.3* 2.3*   PROT 5.6* 5.3*   BILITOT 0.4 0.4   ALKPHOS 98 98   ALT 27 25   AST 24 18   MG 1.8 1.7   PHOS 4.1 4.0     Assessment/Plan:     Pulmonary  Weak cough  - appeared secondary to altered mentation due to his underlying infection  - mentation significantly improved this morning  - NT suction PRN  - 3% nebs to assist with thinning secretions  - continue antibiotics  - PT/OT/ST and up out of bed as much as possible  - follow chest imaging as needed  - follow fever and WBC trends      Kelli Conrad NP  Pulmonary and Critical Care  [facility]

## 2023-10-28 NOTE — PT/OT/SLP PROGRESS
Speech Language Pathology Treatment    Patient Name:  Juan Francisco Parker   MRN:  [id number]  Admitting Diagnosis: Sepsis    Recommendations:                 General Recommendations:  Dysphagia therapy  Diet recommendations:  Minced & Moist Diet - IDDSI Level 5, Liquid Diet Level: Thin liquids - IDDSI Level 0   Aspiration Precautions: Meds whole buried in puree, Small bites/sips, and Standard aspiration precautions   General Precautions: Standard, aspiration  Communication strategies:  provide increased time to answer    Assessment:     Juan Francisco Parker is a 58 y.o. male with an SLP diagnosis of  suspected Dysphagia in the setting of sepsis and AMS s/t UTI and constipation.  He presents with improved RONALD and back to baseline communication with hx developmental/intellectual disability and psychiatric co-morbidities.  Pt exhibited reduced efficiency during bedside CSE s/t missing dentition and generalized weakness with recommendations for IDDSI 5-minced/moist solids and IDDSI 0-thin liquids, following aspiration precautions, oral care/hygiene and 1:1 feeder assist.  Pt and POA would also benefit from ACP/goals of care discussion s/t increased risk of aspiration/dysphagia, advancing age and functional decline in the setting of chronic cognitive and medical co-morbidities.  Subjective     Patient alert, cooperative and seen at bedside with caregiver for intake of breakfast.  Patient goals: n /a       Pain/Comfort:  Pain Rating 1: 0/10  Pain Rating Post-Intervention 1: 0/10    Respiratory Status: Room air    Objective:     Has the patient been evaluated by SLP for swallowing?   Yes  Keep patient NPO? No   Current Respiratory Status:        Observed po intake of breakfast consisting of oatmeal, scrambled eggs, finely chopped sausage nas, and milk via straw. Caregiver assisting with intake. He tolerated without any overt s/s of aspiration. Education provided on diet recommendations, s/s of aspiration, and dysphagia strategies to  reduce risk of aspiration.    Goals:   Multidisciplinary Problems       SLP Goals          Problem: SLP    Goal Priority Disciplines Outcome   SLP Goal     SLP Ongoing, Progressing   Description: 1.  Pt will consume IDDSI 5-minced/moist solids and IDDSI 0-thin liquids without incident.    2.  Education with caregiver on dysphagia/aspiration risks related to chronic cognitive/intellectual disability, warranting ACP/goals of care discussion.                       Plan:     Patient to be seen:  2 x/week   Plan of Care expires:  11/03/23  Plan of Care reviewed with:  patient, caregiver   SLP Follow-Up:  Yes       Discharge recommendations:  Low Intensity Therapy       Time Tracking:     SLP Treatment Date:   10/28/23  Speech Start Time:  0759  Speech Stop Time:  0817     Speech Total Time (min):  18 min    Billable Minutes: Treatment Swallowing Dysfunction 18    10/28/2023

## 2023-10-28 NOTE — SUBJECTIVE & OBJECTIVE
Interval History: Patient doing much better this morning.  He complains of right rib pain with movement.  He is also noted to have some hypoglycemia.  He was started on a D5-NS drip and caretaker was asked to encourage him to drink juices with meals.  Will add Boost to meals.    Review of Systems  Objective:     Vital Signs (Most Recent):  Temp: 97.4 °F (36.3 °C) (10/28/23 0740)  Pulse: 65 (10/28/23 0740)  Resp: 16 (10/28/23 0740)  BP: (!) 106/58 (10/28/23 0740)  SpO2: 95 % (10/28/23 0740) Vital Signs (24h Range):  Temp:  [97.4 °F (36.3 °C)-98.4 °F (36.9 °C)] 97.4 °F (36.3 °C)  Pulse:  [59-80] 65  Resp:  [16-18] 16  SpO2:  [92 %-98 %] 95 %  BP: (105-130)/(58-68) 106/58     Weight: 74.3 kg (163 lb 12.8 oz)  Body mass index is 21.61 kg/m².    Intake/Output Summary (Last 24 hours) at 10/28/2023 0958  Last data filed at 10/28/2023 0447  Gross per 24 hour   Intake --   Output 1100 ml   Net -1100 ml         Physical Exam    Vitals and nursing note reviewed.   Constitutional:       Appearance: Normal appearance.   HENT:      Head: Normocephalic and atraumatic.      Nose: Nose normal.      Mouth/Throat:      Mouth: Mucous membranes are moist.   Eyes:      Extraocular Movements: Extraocular movements intact.      Conjunctiva/sclera: Conjunctivae normal.   Cardiovascular:      Rate and Rhythm: Normal rate and regular rhythm.      Pulses: Normal pulses.      Heart sounds: Normal heart sounds.   Pulmonary:      Effort: normal effort     Breath sounds: CTABL  Abdominal:      General: Abdomen is flat. Bowel sounds are normal.      Palpations: Abdomen is soft.   Musculoskeletal:         General: Normal range of motion.      Cervical back: Normal range of motion and neck supple.      Comments: Feet are dorsiflexed.   Skin:     General: Skin is warm.      Capillary Refill: Capillary refill takes less than 2 seconds.   Neurological:      Mental Status: He is alert. Mental status is at baseline.      Comments: more awake and verbal  this morning. Back to baseline.  Psychiatric:         Mood and Affect: Mood normal.         Behavior: Behavior normal.     Significant Labs: All pertinent labs within the past 24 hours have been reviewed.  Recent Lab Results  (Last 5 results in the past 24 hours)        10/28/23  0534   10/28/23  0530   10/28/23  0435   10/27/23  1942   10/27/23  1548        Albumin     2.3           ALP     98           ALT     25           Anion Gap     12           AST     18           Baso #     0.00           Basophil %     0.0           BILIRUBIN TOTAL     0.4  Comment: For infants and newborns, interpretation of results should be based  on gestational age, weight and in agreement with clinical  observations.    Premature Infant recommended reference ranges:  Up to 24 hours.............<8.0 mg/dL  Up to 48 hours............<12.0 mg/dL  3-5 days..................<15.0 mg/dL  6-29 days.................<15.0 mg/dL             BUN     12           Calcium     8.0           Chloride     100           CO2     23           Creatinine     0.6           Differential Method     Automated           eGFR     >60           Eos #     0.0           Eosinophil %     0.8           Glucose     70           Gran # (ANC)     2.9           Gran %     75.6           Hematocrit     28.0           Hemoglobin     9.5           Immature Grans (Abs)     0.02  Comment: Mild elevation in immature granulocytes is non specific and   can be seen in a variety of conditions including stress response,   acute inflammation, trauma and pregnancy. Correlation with other   laboratory and clinical findings is essential.             Immature Granulocytes     0.5           Lymph #     0.5           Lymph %     13.0           Magnesium      1.7           MCH     32.3           MCHC     33.9           MCV     95           Mono #     0.4           Mono %     10.1           MPV     10.3           nRBC     0           Phosphorus Level     4.0           Platelet Count      67           POCT Glucose 62   60     77   83       Potassium     3.8           PROTEIN TOTAL     5.3           RBC     2.94           RDW     14.7           Sodium     135           WBC     3.78                                  Significant Imaging:   X-Ray Chest 1 View   Final Result      Bi basilar low-grade infiltrates and/or atelectasis.      Short-term follow-up advised         Electronically signed by: Tristan Grullon MD   Date:    10/26/2023   Time:    14:27      X-Ray Abdomen AP 1 View   Final Result      As above         Electronically signed by: Noé Fernandez   Date:    10/24/2023   Time:    23:52

## 2023-10-28 NOTE — PLAN OF CARE
Treatment completed. Pt agreeable to supine exercise/AAROM in bed today. AAROM performed for 10 reps of each: shoulder flexion, wrist flexion/extension, elbow flexion/extension, hip flexion, hip abduction, knee flexion, and ankle DF/PF.

## 2023-10-28 NOTE — PT/OT/SLP PROGRESS
Physical Therapy  Treatment    Juan Francisco Parker   MRN: [id number]   Admitting Diagnosis: Sepsis    PT Received On: 10/28/23  PT Start Time: 1245     PT Stop Time: 1300    PT Total Time (min): 15 min       Billable Minutes:  Therapeutic Exercise 15    Treatment Type: Treatment  PT/PTA: PT     Number of PTA visits since last PT visit: 0       General Precautions: Standard, aspiration, fall  Orthopedic Precautions: N/A  Braces: N/A  Respiratory Status: Room air    Spiritual, Cultural Beliefs, [place] Practices, Values that Affect Care: no    Subjective:  Communicated with RN prior to session.  Pt states that his L shoulder is hurting    Pain/Comfort  Pain Rating 1: 6/10  Location - Side 1: Left  Location 1:  (shoulder)  Pain Addressed 1: Reposition, Distraction  Pain Rating Post-Intervention 1: 6/10    Objective:   Patient found with: peripheral IV, pressure relief boots, telemetry        Treatment and Education:  Pt educated on purpose of PT, call don't fall, and being OOB throughout the day. Pt also educated on exercise within the room daily including: LAQ, heel rocks, ankle pumps, and hip add and abduction.     Treatment completed. Pt agreeable to supine exercise/AAROM in bed today. AAROM performed for 10 reps of each: shoulder flexion, wrist flexion/extension, elbow flexion/extension, hip flexion, hip abduction, knee flexion, and ankle DF/PF.      AM-PAC 6 CLICK MOBILITY  How much help from another person does this patient currently need?   1 = Unable, Total/Dependent Assistance  2 = A lot, Maximum/Moderate Assistance  3 = A little, Minimum/Contact Guard/Supervision  4 = None, Modified [place]/Independent    Turning over in bed (including adjusting bedclothes, sheets and blankets)?: 1  Sitting down on and standing up from a chair with arms (e.g., wheelchair, bedside commode, etc.): 1  Moving from lying on back to sitting on the side of the bed?: 1  Moving to and from a bed to a chair (including a wheelchair)?:  1  Need to walk in hospital room?: 1  Climbing 3-5 steps with a railing?: 1  Basic Mobility Total Score: 6    AM-PAC Raw Score CMS G-Code Modifier Level of Impairment Assistance   6 % Total / Unable   7 - 9 CM 80 - 100% Maximal Assist   10 - 14 CL 60 - 80% Moderate Assist   15 - 19 CK 40 - 60% Moderate Assist   20 - 22 CJ 20 - 40% Minimal Assist   23 CI 1-20% SBA / CGA   24 CH 0% Independent/ Mod I     Patient left HOB elevated with all lines intact, call button in reach, and caregiver present.    Assessment:  Juan Francisco Parker is a 58 y.o. male with a medical diagnosis of Sepsis and presents with weakness, gait instability, decreased upper extremity function, decreased ROM, impaired balance, impaired endurance, impaired coordination, impaired joint extensibility, decreased safety awareness, impaired muscle length, pain, decreased coordination, abnormal tone, impaired functional mobility AAROM performed in supine today. Pt with attempts to help with ROM, however, grimaces with end range of motion of shoulders and hips.     Rehab potential is fair.    Activity tolerance: Fair    Discharge recommendations: Moderate Intensity Therapy      Barriers to discharge:      Equipment recommendations: to be determined by next level of care     GOALS:   Multidisciplinary Problems       Physical Therapy Goals          Problem: Physical Therapy    Goal Priority Disciplines Outcome Goal Variances Interventions   Physical Therapy Goal     PT, PT/OT Ongoing, Progressing     Description: Goals to be met by 11/10/23.  1. Pt will complete bed mobility MOD A.  2. Pt will complete sit to stand MAX A.  3. Pt will transfer bed to chair MAX A.  4. Pt will increase AMPAC score by 2 points to progress functional mobility.                       PLAN:    Patient to be seen 3 x/week to address the above listed problems via gait training, therapeutic activities, therapeutic exercises, neuromuscular re-education  Plan of Care expires:  11/10/23  Plan of Care reviewed with: patient, caregiver         10/28/2023

## 2023-10-28 NOTE — SUBJECTIVE & OBJECTIVE
Mr Parker is a 58-year-old male with intellectual disability who lives in a group home, diabetes mellitus, hyperlipidemia, hypertension, and paranoid personality disorder who was originally admitted to the Hospitalist Service on 10/24 after presenting with altered mental status. He was being treated for UTI. He was more lethargic and having difficulty clearing secretions, prompting pulmonary consult.      At the time of our initial exam, he is alert and tracking, though not conversant.  He has a weak cough with some posterior pharyngeal secretions observed.  Oxygenation is stable on room air.    Interval history:  10/28: Patient awake and alert this morning; talking with staff in the room. Cough appears improved from previous days. Remains on room air.   Objective:     Vital Signs (Most Recent):  Temp: 97.4 °F (36.3 °C) (10/28/23 0740)  Pulse: 65 (10/28/23 0740)  Resp: 16 (10/28/23 0740)  BP: (!) 106/58 (10/28/23 0740)  SpO2: 95 % (10/28/23 0740) Vital Signs (24h Range):  Temp:  [97.4 °F (36.3 °C)-98.4 °F (36.9 °C)] 97.4 °F (36.3 °C)  Pulse:  [59-80] 65  Resp:  [16-18] 16  SpO2:  [92 %-98 %] 95 %  BP: (105-130)/(58-68) 106/58   Weight: 74.3 kg (163 lb 12.8 oz); Body mass index is 21.61 kg/m².    Intake/Output Summary (Last 24 hours) at 10/28/2023 1031  Last data filed at 10/28/2023 0447  Gross per 24 hour   Intake --   Output 1100 ml   Net -1100 ml      Physical Exam  Vitals and nursing note reviewed.   HENT:      Head: Normocephalic.   Eyes:      Conjunctiva/sclera: Conjunctivae normal.   Cardiovascular:      Rate and Rhythm: Normal rate.   Pulmonary:      Effort: Pulmonary effort is normal.      Breath sounds: Normal breath sounds.   Abdominal:      Palpations: Abdomen is soft.   Musculoskeletal:         General: Normal range of motion.      Cervical back: Normal range of motion.   Skin:     General: Skin is warm and dry.   Neurological:      Mental Status: He is alert. Mental status is at baseline.   Psychiatric:          Mood and Affect: Mood normal.         Review of Systems: Negative except as indicated in HPI    Significant Labs:  CBC/Anemia Profile:  Recent Labs   Lab 10/27/23  0421 10/28/23  0435   WBC 2.96* 3.78*   HGB 9.9* 9.5*   HCT 30.5* 28.0*   PLT 58* 67*   MCV 97 95   RDW 15.2* 14.7*   Chemistries:  Recent Labs   Lab 10/27/23  0421 10/28/23  0435    135*   K 4.0 3.8    100   CO2 21* 23   BUN 14 12   CREATININE 0.6 0.6   CALCIUM 8.2* 8.0*   ALBUMIN 2.3* 2.3*   PROT 5.6* 5.3*   BILITOT 0.4 0.4   ALKPHOS 98 98   ALT 27 25   AST 24 18   MG 1.8 1.7   PHOS 4.1 4.0

## 2023-10-29 PROBLEM — E16.2 HYPOGLYCEMIA: Status: ACTIVE | Noted: 2023-10-29

## 2023-10-29 PROBLEM — D61.818 PANCYTOPENIA: Status: ACTIVE | Noted: 2023-10-29

## 2023-10-29 LAB
ALBUMIN SERPL BCP-MCNC: 2 G/DL (ref 3.5–5.2)
ALP SERPL-CCNC: 93 U/L (ref 55–135)
ALT SERPL W/O P-5'-P-CCNC: 23 U/L (ref 10–44)
ANION GAP SERPL CALC-SCNC: 9 MMOL/L (ref 8–16)
AST SERPL-CCNC: 15 U/L (ref 10–40)
BASOPHILS # BLD AUTO: 0 K/UL (ref 0–0.2)
BASOPHILS NFR BLD: 0 % (ref 0–1.9)
BILIRUB SERPL-MCNC: 0.3 MG/DL (ref 0.1–1)
BUN SERPL-MCNC: 10 MG/DL (ref 6–20)
CALCIUM SERPL-MCNC: 7.5 MG/DL (ref 8.7–10.5)
CHLORIDE SERPL-SCNC: 102 MMOL/L (ref 95–110)
CO2 SERPL-SCNC: 23 MMOL/L (ref 23–29)
CREAT SERPL-MCNC: 0.5 MG/DL (ref 0.5–1.4)
DIFFERENTIAL METHOD: ABNORMAL
EOSINOPHIL # BLD AUTO: 0 K/UL (ref 0–0.5)
EOSINOPHIL NFR BLD: 2 % (ref 0–8)
ERYTHROCYTE [DISTWIDTH] IN BLOOD BY AUTOMATED COUNT: 14.8 % (ref 11.5–14.5)
EST. GFR  (NO RACE VARIABLE): >60 ML/MIN/1.73 M^2
GLUCOSE SERPL-MCNC: 89 MG/DL (ref 70–110)
HCT VFR BLD AUTO: 25.1 % (ref 40–54)
HGB BLD-MCNC: 8.5 G/DL (ref 14–18)
IMM GRANULOCYTES # BLD AUTO: 0.03 K/UL (ref 0–0.04)
IMM GRANULOCYTES NFR BLD AUTO: 1.5 % (ref 0–0.5)
LYMPHOCYTES # BLD AUTO: 0.5 K/UL (ref 1–4.8)
LYMPHOCYTES NFR BLD: 26.6 % (ref 18–48)
MAGNESIUM SERPL-MCNC: 1.7 MG/DL (ref 1.6–2.6)
MCH RBC QN AUTO: 32.3 PG (ref 27–31)
MCHC RBC AUTO-ENTMCNC: 33.9 G/DL (ref 32–36)
MCV RBC AUTO: 95 FL (ref 82–98)
MONOCYTES # BLD AUTO: 0.4 K/UL (ref 0.3–1)
MONOCYTES NFR BLD: 17.7 % (ref 4–15)
NEUTROPHILS # BLD AUTO: 1.1 K/UL (ref 1.8–7.7)
NEUTROPHILS NFR BLD: 52.2 % (ref 38–73)
NRBC BLD-RTO: 0 /100 WBC
PHOSPHATE SERPL-MCNC: 3.5 MG/DL (ref 2.7–4.5)
PLATELET # BLD AUTO: 59 K/UL (ref 150–450)
PMV BLD AUTO: 10.3 FL (ref 9.2–12.9)
POCT GLUCOSE: 45 MG/DL (ref 70–110)
POCT GLUCOSE: 64 MG/DL (ref 70–110)
POCT GLUCOSE: 79 MG/DL (ref 70–110)
POCT GLUCOSE: 85 MG/DL (ref 70–110)
POTASSIUM SERPL-SCNC: 3.8 MMOL/L (ref 3.5–5.1)
PROT SERPL-MCNC: 4.9 G/DL (ref 6–8.4)
RBC # BLD AUTO: 2.63 M/UL (ref 4.6–6.2)
SODIUM SERPL-SCNC: 134 MMOL/L (ref 136–145)
WBC # BLD AUTO: 2.03 K/UL (ref 3.9–12.7)

## 2023-10-29 PROCEDURE — 11000001 HC ACUTE MED/SURG PRIVATE ROOM

## 2023-10-29 PROCEDURE — 85025 COMPLETE CBC W/AUTO DIFF WBC: CPT | Performed by: HOSPITALIST

## 2023-10-29 PROCEDURE — 84100 ASSAY OF PHOSPHORUS: CPT | Performed by: HOSPITALIST

## 2023-10-29 PROCEDURE — 80053 COMPREHEN METABOLIC PANEL: CPT | Performed by: HOSPITALIST

## 2023-10-29 PROCEDURE — 97116 GAIT TRAINING THERAPY: CPT | Mod: CQ

## 2023-10-29 PROCEDURE — 83735 ASSAY OF MAGNESIUM: CPT | Performed by: HOSPITALIST

## 2023-10-29 PROCEDURE — 36410 VNPNXR 3YR/> PHY/QHP DX/THER: CPT

## 2023-10-29 PROCEDURE — 63600175 PHARM REV CODE 636 W HCPCS: Performed by: FAMILY MEDICINE

## 2023-10-29 PROCEDURE — 97530 THERAPEUTIC ACTIVITIES: CPT | Mod: CQ

## 2023-10-29 PROCEDURE — 25000003 PHARM REV CODE 250: Performed by: FAMILY MEDICINE

## 2023-10-29 PROCEDURE — C1751 CATH, INF, PER/CENT/MIDLINE: HCPCS

## 2023-10-29 PROCEDURE — 36415 COLL VENOUS BLD VENIPUNCTURE: CPT | Performed by: HOSPITALIST

## 2023-10-29 PROCEDURE — 25000003 PHARM REV CODE 250: Performed by: HOSPITALIST

## 2023-10-29 RX ORDER — SODIUM CHLORIDE 0.9 % (FLUSH) 0.9 %
10 SYRINGE (ML) INJECTION EVERY 6 HOURS
Status: DISCONTINUED | OUTPATIENT
Start: 2023-10-29 | End: 2023-11-05 | Stop reason: HOSPADM

## 2023-10-29 RX ORDER — FUROSEMIDE 10 MG/ML
40 INJECTION INTRAMUSCULAR; INTRAVENOUS
Status: DISCONTINUED | OUTPATIENT
Start: 2023-10-29 | End: 2023-10-31

## 2023-10-29 RX ORDER — SODIUM CHLORIDE 0.9 % (FLUSH) 0.9 %
10 SYRINGE (ML) INJECTION
Status: DISCONTINUED | OUTPATIENT
Start: 2023-10-29 | End: 2023-11-05 | Stop reason: HOSPADM

## 2023-10-29 RX ADMIN — GABAPENTIN 300 MG: 300 CAPSULE ORAL at 03:10

## 2023-10-29 RX ADMIN — DIVALPROEX SODIUM 250 MG: 500 TABLET, FILM COATED, EXTENDED RELEASE ORAL at 09:10

## 2023-10-29 RX ADMIN — OXCARBAZEPINE 600 MG: 150 TABLET, FILM COATED ORAL at 09:10

## 2023-10-29 RX ADMIN — FLUOXETINE 40 MG: 20 CAPSULE ORAL at 09:10

## 2023-10-29 RX ADMIN — CEFTRIAXONE SODIUM 1 G: 1 INJECTION, POWDER, FOR SOLUTION INTRAMUSCULAR; INTRAVENOUS at 03:10

## 2023-10-29 RX ADMIN — FUROSEMIDE 40 MG: 10 INJECTION, SOLUTION INTRAMUSCULAR; INTRAVENOUS at 12:10

## 2023-10-29 RX ADMIN — GABAPENTIN 300 MG: 300 CAPSULE ORAL at 09:10

## 2023-10-29 RX ADMIN — DEXTROSE AND SODIUM CHLORIDE: 5; 900 INJECTION, SOLUTION INTRAVENOUS at 09:10

## 2023-10-29 RX ADMIN — ACETAMINOPHEN 650 MG: 325 TABLET ORAL at 09:10

## 2023-10-29 RX ADMIN — QUETIAPINE FUMARATE 400 MG: 100 TABLET ORAL at 09:10

## 2023-10-29 RX ADMIN — FUROSEMIDE 40 MG: 10 INJECTION, SOLUTION INTRAMUSCULAR; INTRAVENOUS at 10:10

## 2023-10-29 RX ADMIN — TAMSULOSIN HYDROCHLORIDE 0.4 MG: 0.4 CAPSULE ORAL at 09:10

## 2023-10-29 RX ADMIN — RISPERIDONE 0.5 MG: 0.5 TABLET ORAL at 09:10

## 2023-10-29 RX ADMIN — POTASSIUM CHLORIDE 20 MEQ: 1500 TABLET, EXTENDED RELEASE ORAL at 09:10

## 2023-10-29 RX ADMIN — DIVALPROEX SODIUM 1000 MG: 500 TABLET, FILM COATED, EXTENDED RELEASE ORAL at 09:10

## 2023-10-29 RX ADMIN — ATORVASTATIN CALCIUM 10 MG: 10 TABLET, FILM COATED ORAL at 09:10

## 2023-10-29 NOTE — PLAN OF CARE
NSG ADVISE THAT PATIENT IS OKAY FOR THERAPY TODAY BUT WARNS THAT HIS BLOOD/GLUCOSE LEVEL IS IN THE 60'S AND TO NOT LEAVE PATIENT OOB AFTER THIS SESSION.     PATIENT WITH NO C/O    SUPINE-->SIT SBA    SIT-->SUPINE MOD A WITH LOWER EXTREMITIES    SITTING EOB STATIC/DYNAMIC SBA    AMBULATED WITH RW 2 X 60' CG VC TO DEC SPEED TO (INCREASED) SAFETY    PATIENT REQUIRED MULTIPLE CUES FOR COMPLETING 180 DEGREE TURN TO PREPARE TO RETURN TO SIT ON EOB     PATIENT TOLERATED THIS SESSION WITH INCREASED DISTANCE WITH HIS WALKING BUT WITH A JAD FASTER THAN WHAT IS DEEMED SAFE.

## 2023-10-29 NOTE — ASSESSMENT & PLAN NOTE
"This patient does have evidence of infective focus  My overall impression is sepsis.  Source: Urinary Tract  Antibiotics given-   Antibiotics (72h ago, onward)    Start     Stop Route Frequency Ordered    10/29/23 1530  cefTRIAXone (ROCEPHIN) 1 g in dextrose 5 % in water (D5W) 100 mL IVPB (MB+)  ( Urinary Tract Infection (UTI))         11/01/23 1529 IV Every 24 hours (non-standard times) 10/29/23 0818        Latest lactate reviewed-  No results for input(s): "LACTATE" in the last 72 hours.  Organ dysfunction indicated by Encephalopathy    Fluid challenge Ideal Body Weight- The patient's ideal body weight is Ideal body weight: 79.9 kg (176 lb 2.4 oz) which will be used to calculate fluid bolus of 30 ml/kg for treatment of septic shock.      Post- resuscitation assessment No - Post resuscitation assessment not needed     Will Not start Pressors- Levophed for MAP of 65  Source control achieved by: Rocephin     - Vitals more stable this morning  - Will restart leela hugger if temp <98  - Pancytopenic likely related to acute infection.  Will consider hematology consult if patient continues to decompensate  "

## 2023-10-29 NOTE — PLAN OF CARE
Plan of care reviewed with patient, pt verbalized understanding.  Pt remains free from falls this shift, safety precautions in place.bed alarm set.  Pt remains free from skin breakdown, pt educated on the importance of frequent weight shift to decrease the risk of pressure injury. Pt verbalized understanding teaching and outcomes.  AAOx2,NAD noted at this time.  PIV 22 G to R Hand  D 5% in 0.9 NaCl @100 mL   Pt remained afebrile.  NS on the tele monitor  Pt admitted for Sepsis.  Pt currently resting comfortably in bed.  Hourly rounding complete. Bed in lowest position, side rails up, call light in reach.  Sitter from group home remains at the bedside.  Pt is more alert and took all of his medications with me tonight.      Problem: Adult Inpatient Plan of Care  Goal: Plan of Care Review  Outcome: Ongoing, Progressing  Goal: Patient-Specific Goal (Individualized)  Outcome: Ongoing, Progressing  Goal: Absence of Hospital-Acquired Illness or Injury  Outcome: Ongoing, Progressing  Goal: Optimal Comfort and Wellbeing  Outcome: Ongoing, Progressing  Goal: Readiness for Transition of Care  Outcome: Ongoing, Progressing     Problem: Diabetes Comorbidity  Goal: Blood Glucose Level Within Targeted Range  Outcome: Ongoing, Progressing     Problem: Adjustment to Illness (Sepsis/Septic Shock)  Goal: Optimal Coping  Outcome: Ongoing, Progressing     Problem: Bleeding (Sepsis/Septic Shock)  Goal: Absence of Bleeding  Outcome: Ongoing, Progressing     Problem: Glycemic Control Impaired (Sepsis/Septic Shock)  Goal: Blood Glucose Level Within Desired Range  Outcome: Ongoing, Progressing     Problem: Infection Progression (Sepsis/Septic Shock)  Goal: Absence of Infection Signs and Symptoms  Outcome: Ongoing, Progressing     Problem: Nutrition Impaired (Sepsis/Septic Shock)  Goal: Optimal Nutrition Intake  Outcome: Ongoing, Progressing     Problem: Skin Injury Risk Increased  Goal: Skin Health and Integrity  Outcome: Ongoing,  Progressing     Problem: Impaired Wound Healing  Goal: Optimal Wound Healing  Outcome: Ongoing, Progressing     Problem: Fall Injury Risk  Goal: Absence of Fall and Fall-Related Injury  Outcome: Ongoing, Progressing     Problem: UTI (Urinary Tract Infection)  Goal: Improved Infection Symptoms  Outcome: Ongoing, Progressing

## 2023-10-29 NOTE — SUBJECTIVE & OBJECTIVE
Interval History: Patient seen and examined with caretaker at bedside.  Patient noted to have a mild worsening of pancytopenia.  He is still on D5 fluids and only maintained blood glucose in the 80s.  Will adjust fluids to 50cc/hr.  Boost was also ordered with meals.  RN notified.  Also juice with all meals.  Patient lost IV access at this time.  Will try to resume D5 once established.  Currently giving juice with sugar.    Review of Systems  Objective:     Vital Signs (Most Recent):  Temp: 97.1 °F (36.2 °C) (10/29/23 0922)  Pulse: 70 (10/29/23 0922)  Resp: 18 (10/29/23 0922)  BP: (!) 105/57 (10/29/23 0922)  SpO2: (!) 94 % (10/29/23 0922) Vital Signs (24h Range):  Temp:  [97.1 °F (36.2 °C)-98.3 °F (36.8 °C)] 97.1 °F (36.2 °C)  Pulse:  [58-80] 70  Resp:  [18] 18  SpO2:  [90 %-97 %] 94 %  BP: (104-113)/(53-65) 105/57     Weight: 74.3 kg (163 lb 12.8 oz)  Body mass index is 21.61 kg/m².    Intake/Output Summary (Last 24 hours) at 10/29/2023 1012  Last data filed at 10/29/2023 0344  Gross per 24 hour   Intake 1356.21 ml   Output --   Net 1356.21 ml         Physical Exam    Vitals and nursing note reviewed.   Constitutional:       Appearance: Normal appearance.   HENT:      Head: Normocephalic and atraumatic.      Nose: Nose normal.      Mouth/Throat:      Mouth: Mucous membranes are moist.   Eyes:      Extraocular Movements: Extraocular movements intact.      Conjunctiva/sclera: Conjunctivae normal.   Cardiovascular:      Rate and Rhythm: Normal rate and regular rhythm.      Pulses: Normal pulses.      Heart sounds: Normal heart sounds.   Pulmonary:      Effort: normal effort     Breath sounds: CTABL  Abdominal:      General: Abdomen is flat. Bowel sounds are normal.      Palpations: Abdomen is soft.   Musculoskeletal:         General: Normal range of motion.      Cervical back: Normal range of motion and neck supple.      Comments: Feet are dorsiflexed. B/L edema in the Upper and lower extremities.  Skin:     General:  Skin is warm.      Capillary Refill: Capillary refill takes less than 2 seconds.   Neurological:      Mental Status: He is alert. Mental status is at baseline.      Comments: Verbal, but difficult to understand.  Back to baseline.  Psychiatric:         Mood and Affect: Mood normal.         Behavior: Behavior normal.     Significant Labs: All pertinent labs within the past 24 hours have been reviewed.  Recent Lab Results  (Last 5 results in the past 24 hours)        10/29/23  0917   10/29/23  0624   10/29/23  0405   10/28/23  2019   10/28/23  1656        Albumin     2.0           ALP     93           ALT     23           Anion Gap     9           AST     15           Baso #     0.00           Basophil %     0.0           BILIRUBIN TOTAL     0.3  Comment: For infants and newborns, interpretation of results should be based  on gestational age, weight and in agreement with clinical  observations.    Premature Infant recommended reference ranges:  Up to 24 hours.............<8.0 mg/dL  Up to 48 hours............<12.0 mg/dL  3-5 days..................<15.0 mg/dL  6-29 days.................<15.0 mg/dL             BUN     10           Calcium     7.5           Chloride     102           CO2     23           Creatinine     0.5           Differential Method     Automated           eGFR     >60           Eos #     0.0           Eosinophil %     2.0           Glucose     89           Gran # (ANC)     1.1           Gran %     52.2           Hematocrit     25.1           Hemoglobin     8.5           Immature Grans (Abs)     0.03  Comment: Mild elevation in immature granulocytes is non specific and   can be seen in a variety of conditions including stress response,   acute inflammation, trauma and pregnancy. Correlation with other   laboratory and clinical findings is essential.             Immature Granulocytes     1.5           Lymph #     0.5           Lymph %     26.6           Magnesium      1.7           MCH     32.3            MCHC     33.9           MCV     95           Mono #     0.4           Mono %     17.7           MPV     10.3           nRBC     0           Phosphorus Level     3.5           Platelet Count     59           POCT Glucose 64   79     81   82       Potassium     3.8           PROTEIN TOTAL     4.9           RBC     2.63           RDW     14.8           Sodium     134           WBC     2.03                                  Significant Imaging:   X-Ray Chest 1 View   Final Result      Bi basilar low-grade infiltrates and/or atelectasis.      Short-term follow-up advised         Electronically signed by: Tristan Grullon MD   Date:    10/26/2023   Time:    14:27      X-Ray Abdomen AP 1 View   Final Result      As above         Electronically signed by: Noé Fernandez   Date:    10/24/2023   Time:    23:52

## 2023-10-29 NOTE — ASSESSMENT & PLAN NOTE
- Appreciate pulmonary assistance  - 3% saline nebs  - PT/OT/ST: Dysphagia diet with thin liquids  - PT/OT: TBD

## 2023-10-29 NOTE — PT/OT/SLP PROGRESS
Physical Therapy  Treatment    Juan Francisco Parker   MRN: [id number]   Admitting Diagnosis: Sepsis       PT Start Time: 1215     PT Stop Time: 1235    PT Total Time (min): 20 min       Billable Minutes:  Gait Training 15 and Therapeutic Activity 10    Treatment Type: Treatment  PT/PTA: PTA     Number of PTA visits since last PT visit: 1       General Precautions: Standard, fall  Orthopedic Precautions: N/A  Braces: N/A    Spiritual, Cultural Beliefs, Restorationism Practices, Values that Affect Care: no    Subjective:  Communicated with SHADE prior to session.      Pain/Comfort  Pain Rating 1: 0/10  Pain Rating Post-Intervention 1: 0/10    Treatment and Education:    NSG ADVISE THAT PATIENT IS OKAY FOR THERAPY TODAY BUT WARNS THAT HIS BLOOD/GLUCOSE LEVEL IS IN THE 60'S AND TO NOT LEAVE PATIENT OOB AFTER THIS SESSION.     PATIENT WITH NO C/O    SUPINE-->SIT SBA    SIT-->SUPINE MOD A WITH LOWER EXTREMITIES    SITTING EOB STATIC/DYNAMIC SBA    AMBULATED WITH RW 2 X 60' CG VC TO DEC SPEED TO (INCREASED) SAFETY    PATIENT REQUIRED MULTIPLE CUES FOR COMPLETING 180 DEGREE TURN TO PREPARE TO RETURN TO SIT ON EOB     PATIENT TOLERATED THIS SESSION WITH INCREASED DISTANCE WITH HIS WALKING BUT WITH A JAD FASTER THAN WHAT IS DEEMED SAFE.      AM-PAC 6 CLICK MOBILITY  How much help from another person does this patient currently need?   1 = Unable, Total/Dependent Assistance  2 = A lot, Maximum/Moderate Assistance  3 = A little, Minimum/Contact Guard/Supervision  4 = None, Modified [place]/Independent    Turning over in bed (including adjusting bedclothes, sheets and blankets)?: 4  Sitting down on and standing up from a chair with arms (e.g., wheelchair, bedside commode, etc.):  (NA)  Moving from lying on back to sitting on the side of the bed?: 4  Moving to and from a bed to a chair (including a wheelchair)?:  (NA)  Need to walk in hospital room?: 3  Climbing 3-5 steps with a railing?: 1    AM-PAC Raw Score CMS G-Code Modifier  Level of Impairment Assistance   6 % Total / Unable   7 - 9 CM 80 - 100% Maximal Assist   10 - 14 CL 60 - 80% Moderate Assist   15 - 19 CK 40 - 60% Moderate Assist   20 - 22 CJ 20 - 40% Minimal Assist   23 CI 1-20% SBA / CGA   24 CH 0% Independent/ Mod I     Patient left supine with all lines intact, call button in reach, bed alarm on, NSG notified, and 1:1 SITTER present.    Assessment:  Juan Francisco Parker is a 58 y.o. male with a medical diagnosis of Sepsis and presents with .    Rehab identified problem list/impairments: weakness, gait instability, decreased lower extremity function, impaired endurance, impaired coordination, decreased safety awareness, impaired self care skills, impaired functional mobility, decreased coordination    Rehab potential is good.    Activity tolerance: Good    Discharge recommendations: Moderate Intensity Therapy      Barriers to discharge:      Equipment recommendations: shower chair, walker, rolling     GOALS:   Multidisciplinary Problems       Physical Therapy Goals          Problem: Physical Therapy    Goal Priority Disciplines Outcome Goal Variances Interventions   Physical Therapy Goal     PT, PT/OT Ongoing, Progressing     Description: Goals to be met by 11/10/23.  1. Pt will complete bed mobility MOD A.  2. Pt will complete sit to stand MAX A.  3. Pt will transfer bed to chair MAX A.  4. Pt will increase AMPAC score by 2 points to progress functional mobility.                       PLAN:    Patient to be seen 3 x/week to address the above listed problems via gait training, therapeutic activities, therapeutic exercises  Plan of Care expires: 11/10/23  Plan of Care reviewed with: patient, caregiver         10/29/2023

## 2023-10-29 NOTE — ASSESSMENT & PLAN NOTE
"This patient does have evidence of infective focus  My overall impression is sepsis.  Source: Urinary Tract  Antibiotics given-   Antibiotics (72h ago, onward)    Start     Stop Route Frequency Ordered    10/29/23 1530  cefTRIAXone (ROCEPHIN) 1 g in dextrose 5 % in water (D5W) 100 mL IVPB (MB+)  ( Urinary Tract Infection (UTI))         11/01/23 1529 IV Every 24 hours (non-standard times) 10/29/23 0818        Latest lactate reviewed-  No results for input(s): "LACTATE" in the last 72 hours.  Organ dysfunction indicated by Encephalopathy    Fluid challenge Ideal Body Weight- The patient's ideal body weight is Ideal body weight: 79.9 kg (176 lb 2.4 oz) which will be used to calculate fluid bolus of 30 ml/kg for treatment of septic shock.      Post- resuscitation assessment No - Post resuscitation assessment not needed     Will Not start Pressors- Levophed for MAP of 65  Source control achieved by: Rocephin     - Vitals more stable this morning  - Will restart leela hugger if temp <98  - Pancytopenic likely related to acute infection, but due to worsening will consult Hematology.  "

## 2023-10-29 NOTE — PROGRESS NOTES
[facility]  Hospital Medicine  Progress Note    Patient Name: Juan Francisco Parker  MRN: [id number]  Patient Class: IP- Inpatient   Admission Date: 10/24/2023  Length of Stay: 5 days  Attending Physician: Darvin Moura MD  Primary Care Provider: [facility]        Subjective:     Principal Problem:Sepsis        HPI:  Juan Francisco Parker is a 58 y.o. male with a PMH  has a past medical history of Diabetes mellitus, type 2 (2004), Hyperlipidemia, Hypertension, Mental disability, Paranoid personality disorder, Peripheral neuropathy, and Unspecified intellectual disabilities. who presented to the ED to the ED via EMS for further evaluation of worsening altered mental status since this morning.  History mainly obtained through chart review, ED sign-out, and caregiver at bedside as patient has underlying intellectual disability and only able to provide minimal information.  Patient has history of recurrent UTIs and was complaining of belly pain over the past few days.  Caregiver unsure of patient's last bowel movement but has continued to eat pureed food and drink fluids.  Patient usually ambulates with aid of wheelchair but is able to stand and walk around but has been mostly bed-bound the past few days.  No known alleviating or aggravating factors with noted with all other review of systems negative except as stated above.  Initial workup in the ED revealed patient met SIRS criteria for sepsis with UA positive for UTI.  Patient initiated on sepsis protocol and continued on Rocephin with cultures obtained and pending.  Patient being admitted to Hospital Medicine inpatient for continued medical management.       PCP: [facility] Primary [facility]        Overview/Hospital Course:  Mr Parker was initially admitted with severe sepsis requiring fluid resuscitation and a leela hugger.  With improvement in BP the fluids were stopped and with improvements in his temp the leela hugger has been weaned, requiring intermittent restarts.  On  day 2 of hospitalization patient because very letheragic and was not follow commands.  He appeared to be trying to cough up mucus but due to extreme weakness was not able to do so.  After aggressive suctioning he was able to have some mucus removed, but continued to have a weak cough.  Dr. Cotter (pulmonary) was consulted.  He started the patient on 3% saline nebs to help with the congestion.  Chest xray Bi basilar low-grade infiltrates and/or atelectasis.  PT/OT/ST  - dysphagia diet and SNF.  Patient is also continued on empiric Rocephin for his UTI and possible pulmonary infection.  Urine culture + gram negative rods.      Interval History: Patient seen and examined with caretaker at bedside.  Patient noted to have a mild worsening of pancytopenia.  He is still on D5 fluids and only maintained blood glucose in the 80s.  Will adjust fluids to 50cc/hr.  Boost was also ordered with meals.  RN notified.  Also juice with all meals.  Patient lost IV access at this time.  Will try to resume D5 once established.  Currently giving juice with sugar.    Review of Systems  Objective:     Vital Signs (Most Recent):  Temp: 97.1 °F (36.2 °C) (10/29/23 0922)  Pulse: 70 (10/29/23 0922)  Resp: 18 (10/29/23 0922)  BP: (!) 105/57 (10/29/23 0922)  SpO2: (!) 94 % (10/29/23 0922) Vital Signs (24h Range):  Temp:  [97.1 °F (36.2 °C)-98.3 °F (36.8 °C)] 97.1 °F (36.2 °C)  Pulse:  [58-80] 70  Resp:  [18] 18  SpO2:  [90 %-97 %] 94 %  BP: (104-113)/(53-65) 105/57     Weight: 74.3 kg (163 lb 12.8 oz)  Body mass index is 21.61 kg/m².    Intake/Output Summary (Last 24 hours) at 10/29/2023 1012  Last data filed at 10/29/2023 0344  Gross per 24 hour   Intake 1356.21 ml   Output --   Net 1356.21 ml         Physical Exam    Vitals and nursing note reviewed.   Constitutional:       Appearance: Normal appearance.   HENT:      Head: Normocephalic and atraumatic.      Nose: Nose normal.      Mouth/Throat:      Mouth: Mucous membranes are moist.   Eyes:       Extraocular Movements: Extraocular movements intact.      Conjunctiva/sclera: Conjunctivae normal.   Cardiovascular:      Rate and Rhythm: Normal rate and regular rhythm.      Pulses: Normal pulses.      Heart sounds: Normal heart sounds.   Pulmonary:      Effort: normal effort     Breath sounds: CTABL  Abdominal:      General: Abdomen is flat. Bowel sounds are normal.      Palpations: Abdomen is soft.   Musculoskeletal:         General: Normal range of motion.      Cervical back: Normal range of motion and neck supple.      Comments: Feet are dorsiflexed. B/L edema in the Upper and lower extremities.  Skin:     General: Skin is warm.      Capillary Refill: Capillary refill takes less than 2 seconds.   Neurological:      Mental Status: He is alert. Mental status is at baseline.      Comments: Verbal, but difficult to understand.  Back to baseline.  Psychiatric:         Mood and Affect: Mood normal.         Behavior: Behavior normal.     Significant Labs: All pertinent labs within the past 24 hours have been reviewed.  Recent Lab Results  (Last 5 results in the past 24 hours)        10/29/23  0917   10/29/23  0624   10/29/23  0405   10/28/23  2019   10/28/23  1656        Albumin     2.0           ALP     93           ALT     23           Anion Gap     9           AST     15           Baso #     0.00           Basophil %     0.0           BILIRUBIN TOTAL     0.3  Comment: For infants and newborns, interpretation of results should be based  on gestational age, weight and in agreement with clinical  observations.    Premature Infant recommended reference ranges:  Up to 24 hours.............<8.0 mg/dL  Up to 48 hours............<12.0 mg/dL  3-5 days..................<15.0 mg/dL  6-29 days.................<15.0 mg/dL             BUN     10           Calcium     7.5           Chloride     102           CO2     23           Creatinine     0.5           Differential Method     Automated           eGFR     >60            Eos #     0.0           Eosinophil %     2.0           Glucose     89           Gran # (ANC)     1.1           Gran %     52.2           Hematocrit     25.1           Hemoglobin     8.5           Immature Grans (Abs)     0.03  Comment: Mild elevation in immature granulocytes is non specific and   can be seen in a variety of conditions including stress response,   acute inflammation, trauma and pregnancy. Correlation with other   laboratory and clinical findings is essential.             Immature Granulocytes     1.5           Lymph #     0.5           Lymph %     26.6           Magnesium      1.7           MCH     32.3           MCHC     33.9           MCV     95           Mono #     0.4           Mono %     17.7           MPV     10.3           nRBC     0           Phosphorus Level     3.5           Platelet Count     59           POCT Glucose 64   79     81   82       Potassium     3.8           PROTEIN TOTAL     4.9           RBC     2.63           RDW     14.8           Sodium     134           WBC     2.03                                  Significant Imaging:   X-Ray Chest 1 View   Final Result      Bi basilar low-grade infiltrates and/or atelectasis.      Short-term follow-up advised         Electronically signed by: Tristan Grullon MD   Date:    10/26/2023   Time:    14:27      X-Ray Abdomen AP 1 View   Final Result      As above         Electronically signed by: Noé Fernandez   Date:    10/24/2023   Time:    23:52             Assessment/Plan:      * Sepsis  This patient does have evidence of infective focus  My overall impression is sepsis.  Source: Urinary Tract  Antibiotics given-   Antibiotics (72h ago, onward)    Start     Stop Route Frequency Ordered    10/29/23 1530  cefTRIAXone (ROCEPHIN) 1 g in dextrose 5 % in water (D5W) 100 mL IVPB (MB+)  ( Urinary Tract Infection (UTI))         11/01/23 1529 IV Every 24 hours (non-standard times) 10/29/23 0818        Latest lactate reviewed-  No results for input(s):  ""LACTATE" in the last 72 hours.  Organ dysfunction indicated by Encephalopathy    Fluid challenge Ideal Body Weight- The patient's ideal body weight is Ideal body weight: 79.9 kg (176 lb 2.4 oz) which will be used to calculate fluid bolus of 30 ml/kg for treatment of septic shock.      Post- resuscitation assessment No - Post resuscitation assessment not needed     Will Not start Pressors- Levophed for MAP of 65  Source control achieved by: Rocephin     - Vitals more stable this morning  - Will restart leela hugger if temp <98  - Pancytopenic likely related to acute infection, but due to worsening will consult Hematology.    History of DVT (deep vein thrombosis)  Currently on Eliquis b.i.d..  Plan:  -Hold Eliquis 5mg PO BID in the setting of pancytopenia (plt 67)      Pancytopenia  This patient is found to have pancytopenia, the likely etiology is Infection/Sepsis, will monitor CBC Daily. Will transfuse red blood cells if the hemoglobin is <7g/dL (or <8 in the setting of ACS). Will transfuse platelets if platelet count is <50k (if undergoing surgical procedure or have active bleeding). Hold DVT prophylaxis if platelets are <50k. The patient's hemoglobin, white blood cell count, and platelet count results have been reviewed and are listed below.  Recent Labs   Lab 10/29/23  0405   HGB 8.5*   WBC 2.03*   PLT 59*       - Hematology consulted for evaluation      Hypoglycemia  - ? etiololgy  - currently on D5 1/2NS (BG 80s on fluids)  - due to edema will need to reduce rate to 50cc and start BID lasix  - If no control of BG will need to transfer to ICU for D10 drip.      Weak cough  - Appreciate pulmonary assistance  - 3% saline nebs  - PT/OT/ST: Dysphagia diet with thin liquids  - PT/OT: TBD    Neuropathy  Currently on gabapentin outpatient.  Plan:  -continue home medication      Anxiety and depression  Chronic. Stable. Not in acute exacerbation and currently denies endorsing any suicidal/homicidal ideations. " "  Plan:  -Continue home medications       Benign prostatic hyperplasia  Currently on Flomax outpatient.  Plan:  -continue home medication      Hyperlipidemia  Patient is chronically on statin.will continue for now. Last Lipid Panel: No results found for: "CHOL", "HDL", "LDLCALC", "TRIG", "CHOLHDL"  Plan:  -Continue home medication  -low fat/low calorie diet      Controlled type 2 diabetes mellitus, without long-term current use of insulin  Most recent A1c measuring   Hemoglobin A1C   Date Value Ref Range Status   12/10/2021 5.1 <=6.5 % Final   Current blood glucose measuring 75  Plan:  -SSI  -Accu-checks   -Hypoglycemic protocol   -Continue home gabapentin   -Hold oral antihyperglycemics while inpatient   -D10 given on 10/26      Primary hypertension  Currently normotensive. BP currently ranging from  systolic.   Plan:  -Optimize pain control   -Hold home medications in setting of sepsis, titrate as needed   -Monitor BP  -IV hydralazine prn for SBP>160 or DBP>90         Intellectual disability  - care taker from snf at bedside.        VTE Risk Mitigation (From admission, onward)         Ordered     Place sequential compression device  Until discontinued         10/24/23 1907     IP VTE LOW RISK PATIENT  Once         10/24/23 1907                Discharge Planning   GEOFF:      Code Status: Full Code   Is the patient medically ready for discharge?:     Reason for patient still in hospital (select all that apply): Patient unstable, Patient trending condition, Laboratory test, Treatment and Pending disposition  Discharge Plan A: Group Home                  aDrvin Moura MD  Department of Hospital Medicine   [facility]  "

## 2023-10-29 NOTE — PLAN OF CARE
Problem: Adult Inpatient Plan of Care  Goal: Plan of Care Review  Outcome: Ongoing, Progressing  Goal: Patient-Specific Goal (Individualized)  Outcome: Ongoing, Progressing  Goal: Absence of Hospital-Acquired Illness or Injury  Outcome: Ongoing, Progressing  Goal: Optimal Comfort and Wellbeing  Outcome: Ongoing, Progressing  Goal: Readiness for Transition of Care  Outcome: Ongoing, Progressing     Problem: Diabetes Comorbidity  Goal: Blood Glucose Level Within Targeted Range  Outcome: Ongoing, Progressing     Problem: Adjustment to Illness (Sepsis/Septic Shock)  Goal: Optimal Coping  Outcome: Ongoing, Progressing     Problem: Bleeding (Sepsis/Septic Shock)  Goal: Absence of Bleeding  Outcome: Ongoing, Progressing     Problem: Glycemic Control Impaired (Sepsis/Septic Shock)  Goal: Blood Glucose Level Within Desired Range  Outcome: Ongoing, Progressing     Problem: Infection Progression (Sepsis/Septic Shock)  Goal: Absence of Infection Signs and Symptoms  Outcome: Ongoing, Progressing     Problem: Nutrition Impaired (Sepsis/Septic Shock)  Goal: Optimal Nutrition Intake  Outcome: Ongoing, Progressing     Problem: Skin Injury Risk Increased  Goal: Skin Health and Integrity  Outcome: Ongoing, Progressing     Problem: Impaired Wound Healing  Goal: Optimal Wound Healing  Outcome: Ongoing, Progressing     Problem: Fall Injury Risk  Goal: Absence of Fall and Fall-Related Injury  Outcome: Ongoing, Progressing     Problem: UTI (Urinary Tract Infection)  Goal: Improved Infection Symptoms  Outcome: Ongoing, Progressing    Patient remains free from falls and injury, NADN. VSS. Will continue to monitor, will continue with plan of care.

## 2023-10-29 NOTE — PROGRESS NOTES
[facility]  Hospital Medicine  Progress Note    Patient Name: Juan Francisco Parker  MRN: [id number]  Patient Class: IP- Inpatient   Admission Date: 10/24/2023  Length of Stay: 5 days  Attending Physician: Darvin Moura MD  Primary Care Provider: [facility]        Subjective:     Principal Problem:Sepsis        HPI:  Juan Francisco Parker is a 58 y.o. male with a PMH  has a past medical history of Diabetes mellitus, type 2 (2004), Hyperlipidemia, Hypertension, Mental disability, Paranoid personality disorder, Peripheral neuropathy, and Unspecified intellectual disabilities. who presented to the ED to the ED via EMS for further evaluation of worsening altered mental status since this morning.  History mainly obtained through chart review, ED sign-out, and caregiver at bedside as patient has underlying intellectual disability and only able to provide minimal information.  Patient has history of recurrent UTIs and was complaining of belly pain over the past few days.  Caregiver unsure of patient's last bowel movement but has continued to eat pureed food and drink fluids.  Patient usually ambulates with aid of wheelchair but is able to stand and walk around but has been mostly bed-bound the past few days.  No known alleviating or aggravating factors with noted with all other review of systems negative except as stated above.  Initial workup in the ED revealed patient met SIRS criteria for sepsis with UA positive for UTI.  Patient initiated on sepsis protocol and continued on Rocephin with cultures obtained and pending.  Patient being admitted to Hospital Medicine inpatient for continued medical management.       PCP: [facility] Primary Doctor        Overview/Hospital Course:  Mr Parker was initially admitted with severe sepsis requiring fluid resuscitation and a leela hugger.  With improvement in BP the fluids were stopped and with improvements in his temp the leela hugger has been weaned, requiring intermittent restarts.  On  day 2 of hospitalization patient because very letheragic and was not follow commands.  He appeared to be trying to cough up mucus but due to extreme weakness was not able to do so.  After aggressive suctioning he was able to have some mucus removed, but continued to have a weak cough.  Dr. Cotter (pulmonary) was consulted.  He started the patient on 3% saline nebs to help with the congestion.  Chest xray Bi basilar low-grade infiltrates and/or atelectasis.  PT/OT/ST  - dysphagia diet and SNF.  Patient is also continued on empiric Rocephin for his UTI and possible pulmonary infection.  Urine culture + gram negative rods.      Interval History: Patient doing much better this morning.  He complains of right rib pain with movement.  He is also noted to have some hypoglycemia.  He was started on a D5-NS drip and caretaker was asked to encourage him to drink juices with meals.  Will add Boost to meals.    Review of Systems  Objective:     Vital Signs (Most Recent):  Temp: 97.4 °F (36.3 °C) (10/28/23 0740)  Pulse: 65 (10/28/23 0740)  Resp: 16 (10/28/23 0740)  BP: (!) 106/58 (10/28/23 0740)  SpO2: 95 % (10/28/23 0740) Vital Signs (24h Range):  Temp:  [97.4 °F (36.3 °C)-98.4 °F (36.9 °C)] 97.4 °F (36.3 °C)  Pulse:  [59-80] 65  Resp:  [16-18] 16  SpO2:  [92 %-98 %] 95 %  BP: (105-130)/(58-68) 106/58     Weight: 74.3 kg (163 lb 12.8 oz)  Body mass index is 21.61 kg/m².    Intake/Output Summary (Last 24 hours) at 10/28/2023 5181  Last data filed at 10/28/2023 9994  Gross per 24 hour   Intake --   Output 1100 ml   Net -1100 ml         Physical Exam    Vitals and nursing note reviewed.   Constitutional:       Appearance: Normal appearance.   HENT:      Head: Normocephalic and atraumatic.      Nose: Nose normal.      Mouth/Throat:      Mouth: Mucous membranes are moist.   Eyes:      Extraocular Movements: Extraocular movements intact.      Conjunctiva/sclera: Conjunctivae normal.   Cardiovascular:      Rate and Rhythm: Normal  rate and regular rhythm.      Pulses: Normal pulses.      Heart sounds: Normal heart sounds.   Pulmonary:      Effort: normal effort     Breath sounds: CTABL  Abdominal:      General: Abdomen is flat. Bowel sounds are normal.      Palpations: Abdomen is soft.   Musculoskeletal:         General: Normal range of motion.      Cervical back: Normal range of motion and neck supple.      Comments: Feet are dorsiflexed.   Skin:     General: Skin is warm.      Capillary Refill: Capillary refill takes less than 2 seconds.   Neurological:      Mental Status: He is alert. Mental status is at baseline.      Comments: more awake and verbal this morning. Back to baseline.  Psychiatric:         Mood and Affect: Mood normal.         Behavior: Behavior normal.     Significant Labs: All pertinent labs within the past 24 hours have been reviewed.  Recent Lab Results  (Last 5 results in the past 24 hours)        10/28/23  0534   10/28/23  0530   10/28/23  0435   10/27/23  1942   10/27/23  1548        Albumin     2.3           ALP     98           ALT     25           Anion Gap     12           AST     18           Baso #     0.00           Basophil %     0.0           BILIRUBIN TOTAL     0.4  Comment: For infants and newborns, interpretation of results should be based  on gestational age, weight and in agreement with clinical  observations.    Premature Infant recommended reference ranges:  Up to 24 hours.............<8.0 mg/dL  Up to 48 hours............<12.0 mg/dL  3-5 days..................<15.0 mg/dL  6-29 days.................<15.0 mg/dL             BUN     12           Calcium     8.0           Chloride     100           CO2     23           Creatinine     0.6           Differential Method     Automated           eGFR     >60           Eos #     0.0           Eosinophil %     0.8           Glucose     70           Gran # (ANC)     2.9           Gran %     75.6           Hematocrit     28.0           Hemoglobin     9.5            "Immature Grans (Abs)     0.02  Comment: Mild elevation in immature granulocytes is non specific and   can be seen in a variety of conditions including stress response,   acute inflammation, trauma and pregnancy. Correlation with other   laboratory and clinical findings is essential.             Immature Granulocytes     0.5           Lymph #     0.5           Lymph %     13.0           Magnesium      1.7           MCH     32.3           MCHC     33.9           MCV     95           Mono #     0.4           Mono %     10.1           MPV     10.3           nRBC     0           Phosphorus Level     4.0           Platelet Count     67           POCT Glucose 62   60     77   83       Potassium     3.8           PROTEIN TOTAL     5.3           RBC     2.94           RDW     14.7           Sodium     135           WBC     3.78                                  Significant Imaging:   X-Ray Chest 1 View   Final Result      Bi basilar low-grade infiltrates and/or atelectasis.      Short-term follow-up advised         Electronically signed by: Tristan Grullon MD   Date:    10/26/2023   Time:    14:27      X-Ray Abdomen AP 1 View   Final Result      As above         Electronically signed by: Noé Fernandez   Date:    10/24/2023   Time:    23:52             Assessment/Plan:      * Sepsis  This patient does have evidence of infective focus  My overall impression is sepsis.  Source: Urinary Tract  Antibiotics given-   Antibiotics (72h ago, onward)    Start     Stop Route Frequency Ordered    10/29/23 1530  cefTRIAXone (ROCEPHIN) 1 g in dextrose 5 % in water (D5W) 100 mL IVPB (MB+)  ( Urinary Tract Infection (UTI))         11/01/23 1529 IV Every 24 hours (non-standard times) 10/29/23 0818        Latest lactate reviewed-  No results for input(s): "LACTATE" in the last 72 hours.  Organ dysfunction indicated by Encephalopathy    Fluid challenge Ideal Body Weight- The patient's ideal body weight is Ideal body weight: 79.9 kg (176 lb 2.4 oz) " "which will be used to calculate fluid bolus of 30 ml/kg for treatment of septic shock.      Post- resuscitation assessment No - Post resuscitation assessment not needed     Will Not start Pressors- Levophed for MAP of 65  Source control achieved by: Rocephin     - Vitals more stable this morning  - Will restart leela hugger if temp <98  - Pancytopenic likely related to acute infection.  Will consider hematology consult if patient continues to decompensate    History of DVT (deep vein thrombosis)  Currently on Eliquis b.i.d..  Plan:  -Hold Eliquis 5mg PO BID in the setting of pancytopenia (plt 67)      Weak cough  - Appreciate pulmonary assistance  - 3% saline nebs  - PT/OT/ST pending    Neuropathy  Currently on gabapentin outpatient.  Plan:  -continue home medication      Anxiety and depression  Chronic. Stable. Not in acute exacerbation and currently denies endorsing any suicidal/homicidal ideations.   Plan:  -Continue home medications       Benign prostatic hyperplasia  Currently on Flomax outpatient.  Plan:  -continue home medication      Hyperlipidemia  Patient is chronically on statin.will continue for now. Last Lipid Panel: No results found for: "CHOL", "HDL", "LDLCALC", "TRIG", "CHOLHDL"  Plan:  -Continue home medication  -low fat/low calorie diet      Controlled type 2 diabetes mellitus, without long-term current use of insulin  Most recent A1c measuring   Hemoglobin A1C   Date Value Ref Range Status   12/10/2021 5.1 <=6.5 % Final   Current blood glucose measuring 75  Plan:  -SSI  -Accu-checks   -Hypoglycemic protocol   -Continue home gabapentin   -Hold oral antihyperglycemics while inpatient   -D10 given on 10/26      Primary hypertension  Currently normotensive. BP currently ranging from  systolic.   Plan:  -Optimize pain control   -Hold home medications in setting of sepsis, titrate as needed   -Monitor BP  -IV hydralazine prn for SBP>160 or DBP>90         Intellectual disability  - care taker from " group home at bedside.        VTE Risk Mitigation (From admission, onward)         Ordered     Place sequential compression device  Until discontinued         10/24/23 1907     IP VTE LOW RISK PATIENT  Once         10/24/23 1907                Discharge Planning   GEOFF:      Code Status: Full Code   Is the patient medically ready for discharge?:     Reason for patient still in hospital (select all that apply): Patient trending condition  Discharge Plan A: Group Home                  Darvin Moura MD  Department of Hospital Medicine   [facility]

## 2023-10-29 NOTE — ASSESSMENT & PLAN NOTE
This patient is found to have pancytopenia, the likely etiology is Infection/Sepsis, will monitor CBC Daily. Will transfuse red blood cells if the hemoglobin is <7g/dL (or <8 in the setting of ACS). Will transfuse platelets if platelet count is <50k (if undergoing surgical procedure or have active bleeding). Hold DVT prophylaxis if platelets are <50k. The patient's hemoglobin, white blood cell count, and platelet count results have been reviewed and are listed below.  Recent Labs   Lab 10/29/23  0405   HGB 8.5*   WBC 2.03*   PLT 59*       - Hematology consulted for evaluation

## 2023-10-29 NOTE — NURSING
"0655: Bed alarm checked during report, active and on. Sitter lying on the couch. Patient resting in bed, NADN.      Patient's private sitter from patient's facility reported to this RN that when she arrived at 0745,"the patient was on the floor."     Upon staff member entering the room, the patient was found to be sitting on the edge of the bed with a male overnight private sitter at the bedside. That sitter stated he was in the restroom and was unsure of what happened.  The patient had removed his own IV. 2 staff members assisted the patient back up to the correct position.     MD notified. No injury reported. No new orders/      "

## 2023-10-29 NOTE — ASSESSMENT & PLAN NOTE
- ? etiololgy  - currently on D5 1/2NS (BG 80s on fluids)  - due to edema will need to reduce rate to 50cc and start BID lasix  - If no control of BG will need to transfer to ICU for D10 drip.

## 2023-10-29 NOTE — ASSESSMENT & PLAN NOTE
Currently on Eliquis b.i.d..  Plan:  -Hold Eliquis 5mg PO BID in the setting of pancytopenia (plt 67)

## 2023-10-30 LAB
ALBUMIN SERPL BCP-MCNC: 2.3 G/DL (ref 3.5–5.2)
ALP SERPL-CCNC: 92 U/L (ref 55–135)
ALT SERPL W/O P-5'-P-CCNC: 22 U/L (ref 10–44)
ANION GAP SERPL CALC-SCNC: 13 MMOL/L (ref 8–16)
AST SERPL-CCNC: 20 U/L (ref 10–40)
BACTERIA BLD CULT: NORMAL
BACTERIA BLD CULT: NORMAL
BASOPHILS # BLD AUTO: 0.01 K/UL (ref 0–0.2)
BASOPHILS NFR BLD: 0.4 % (ref 0–1.9)
BILIRUB SERPL-MCNC: 0.3 MG/DL (ref 0.1–1)
BUN SERPL-MCNC: 16 MG/DL (ref 6–20)
CALCIUM SERPL-MCNC: 8.2 MG/DL (ref 8.7–10.5)
CHLORIDE SERPL-SCNC: 99 MMOL/L (ref 95–110)
CO2 SERPL-SCNC: 23 MMOL/L (ref 23–29)
CREAT SERPL-MCNC: 0.6 MG/DL (ref 0.5–1.4)
DIFFERENTIAL METHOD: ABNORMAL
EOSINOPHIL # BLD AUTO: 0 K/UL (ref 0–0.5)
EOSINOPHIL NFR BLD: 1.5 % (ref 0–8)
ERYTHROCYTE [DISTWIDTH] IN BLOOD BY AUTOMATED COUNT: 15 % (ref 11.5–14.5)
EST. GFR  (NO RACE VARIABLE): >60 ML/MIN/1.73 M^2
GLUCOSE SERPL-MCNC: 52 MG/DL (ref 70–110)
HCT VFR BLD AUTO: 27 % (ref 40–54)
HGB BLD-MCNC: 9.1 G/DL (ref 14–18)
IMM GRANULOCYTES # BLD AUTO: 0.02 K/UL (ref 0–0.04)
IMM GRANULOCYTES NFR BLD AUTO: 0.7 % (ref 0–0.5)
LYMPHOCYTES # BLD AUTO: 0.6 K/UL (ref 1–4.8)
LYMPHOCYTES NFR BLD: 21.9 % (ref 18–48)
MAGNESIUM SERPL-MCNC: 1.9 MG/DL (ref 1.6–2.6)
MCH RBC QN AUTO: 32.2 PG (ref 27–31)
MCHC RBC AUTO-ENTMCNC: 33.7 G/DL (ref 32–36)
MCV RBC AUTO: 95 FL (ref 82–98)
MONOCYTES # BLD AUTO: 0.5 K/UL (ref 0.3–1)
MONOCYTES NFR BLD: 20.1 % (ref 4–15)
NEUTROPHILS # BLD AUTO: 1.5 K/UL (ref 1.8–7.7)
NEUTROPHILS NFR BLD: 55.4 % (ref 38–73)
NRBC BLD-RTO: 0 /100 WBC
PHOSPHATE SERPL-MCNC: 3.4 MG/DL (ref 2.7–4.5)
PLATELET # BLD AUTO: 120 K/UL (ref 150–450)
PMV BLD AUTO: 11.1 FL (ref 9.2–12.9)
POCT GLUCOSE: 67 MG/DL (ref 70–110)
POCT GLUCOSE: 81 MG/DL (ref 70–110)
POCT GLUCOSE: 84 MG/DL (ref 70–110)
POTASSIUM SERPL-SCNC: 4 MMOL/L (ref 3.5–5.1)
PROT SERPL-MCNC: 5.4 G/DL (ref 6–8.4)
RBC # BLD AUTO: 2.83 M/UL (ref 4.6–6.2)
SODIUM SERPL-SCNC: 135 MMOL/L (ref 136–145)
WBC # BLD AUTO: 2.69 K/UL (ref 3.9–12.7)

## 2023-10-30 PROCEDURE — 85025 COMPLETE CBC W/AUTO DIFF WBC: CPT | Performed by: HOSPITALIST

## 2023-10-30 PROCEDURE — 63600175 PHARM REV CODE 636 W HCPCS: Performed by: HOSPITALIST

## 2023-10-30 PROCEDURE — 25000003 PHARM REV CODE 250: Performed by: FAMILY MEDICINE

## 2023-10-30 PROCEDURE — 83735 ASSAY OF MAGNESIUM: CPT | Performed by: HOSPITALIST

## 2023-10-30 PROCEDURE — 63600175 PHARM REV CODE 636 W HCPCS: Performed by: FAMILY MEDICINE

## 2023-10-30 PROCEDURE — A4216 STERILE WATER/SALINE, 10 ML: HCPCS | Performed by: FAMILY MEDICINE

## 2023-10-30 PROCEDURE — 84100 ASSAY OF PHOSPHORUS: CPT | Performed by: HOSPITALIST

## 2023-10-30 PROCEDURE — 25000003 PHARM REV CODE 250: Performed by: HOSPITALIST

## 2023-10-30 PROCEDURE — 99232 SBSQ HOSP IP/OBS MODERATE 35: CPT | Mod: ,,, | Performed by: INTERNAL MEDICINE

## 2023-10-30 PROCEDURE — 99232 PR SUBSEQUENT HOSPITAL CARE,LEVL II: ICD-10-PCS | Mod: ,,, | Performed by: INTERNAL MEDICINE

## 2023-10-30 PROCEDURE — 80053 COMPREHEN METABOLIC PANEL: CPT | Performed by: HOSPITALIST

## 2023-10-30 PROCEDURE — G0426 PR INPT TELEHEALTH CONSULT 50M: ICD-10-PCS | Mod: 95,,, | Performed by: INTERNAL MEDICINE

## 2023-10-30 PROCEDURE — 92526 ORAL FUNCTION THERAPY: CPT

## 2023-10-30 PROCEDURE — 11000001 HC ACUTE MED/SURG PRIVATE ROOM

## 2023-10-30 PROCEDURE — 97535 SELF CARE MNGMENT TRAINING: CPT

## 2023-10-30 PROCEDURE — G0426 INPT/ED TELECONSULT50: HCPCS | Mod: 95,,, | Performed by: INTERNAL MEDICINE

## 2023-10-30 RX ADMIN — ONDANSETRON 4 MG: 2 INJECTION INTRAMUSCULAR; INTRAVENOUS at 10:10

## 2023-10-30 RX ADMIN — DEXTROSE AND SODIUM CHLORIDE: 5; 900 INJECTION, SOLUTION INTRAVENOUS at 03:10

## 2023-10-30 RX ADMIN — CEFTRIAXONE SODIUM 1 G: 1 INJECTION, POWDER, FOR SOLUTION INTRAMUSCULAR; INTRAVENOUS at 03:10

## 2023-10-30 RX ADMIN — FUROSEMIDE 40 MG: 10 INJECTION, SOLUTION INTRAMUSCULAR; INTRAVENOUS at 11:10

## 2023-10-30 RX ADMIN — POTASSIUM CHLORIDE 20 MEQ: 1500 TABLET, EXTENDED RELEASE ORAL at 09:10

## 2023-10-30 RX ADMIN — SODIUM CHLORIDE, PRESERVATIVE FREE 10 ML: 5 INJECTION INTRAVENOUS at 12:10

## 2023-10-30 RX ADMIN — ATORVASTATIN CALCIUM 10 MG: 10 TABLET, FILM COATED ORAL at 09:10

## 2023-10-30 RX ADMIN — OXCARBAZEPINE 600 MG: 150 TABLET, FILM COATED ORAL at 08:10

## 2023-10-30 RX ADMIN — RISPERIDONE 0.5 MG: 0.5 TABLET ORAL at 09:10

## 2023-10-30 RX ADMIN — DIVALPROEX SODIUM 250 MG: 500 TABLET, FILM COATED, EXTENDED RELEASE ORAL at 09:10

## 2023-10-30 RX ADMIN — ACETAMINOPHEN 650 MG: 325 TABLET ORAL at 03:10

## 2023-10-30 RX ADMIN — GABAPENTIN 300 MG: 300 CAPSULE ORAL at 08:10

## 2023-10-30 RX ADMIN — QUETIAPINE FUMARATE 400 MG: 100 TABLET ORAL at 08:10

## 2023-10-30 RX ADMIN — TAMSULOSIN HYDROCHLORIDE 0.4 MG: 0.4 CAPSULE ORAL at 09:10

## 2023-10-30 RX ADMIN — FUROSEMIDE 40 MG: 10 INJECTION, SOLUTION INTRAMUSCULAR; INTRAVENOUS at 12:10

## 2023-10-30 RX ADMIN — FLUOXETINE 40 MG: 20 CAPSULE ORAL at 09:10

## 2023-10-30 RX ADMIN — SODIUM CHLORIDE, PRESERVATIVE FREE 10 ML: 5 INJECTION INTRAVENOUS at 11:10

## 2023-10-30 RX ADMIN — GABAPENTIN 300 MG: 300 CAPSULE ORAL at 09:10

## 2023-10-30 RX ADMIN — RISPERIDONE 0.5 MG: 0.5 TABLET ORAL at 08:10

## 2023-10-30 RX ADMIN — OXCARBAZEPINE 600 MG: 150 TABLET, FILM COATED ORAL at 09:10

## 2023-10-30 RX ADMIN — GABAPENTIN 300 MG: 300 CAPSULE ORAL at 03:10

## 2023-10-30 RX ADMIN — DIVALPROEX SODIUM 1000 MG: 500 TABLET, FILM COATED, EXTENDED RELEASE ORAL at 08:10

## 2023-10-30 RX ADMIN — POTASSIUM CHLORIDE 20 MEQ: 1500 TABLET, EXTENDED RELEASE ORAL at 08:10

## 2023-10-30 NOTE — ASSESSMENT & PLAN NOTE
- appeared to be s/t AMS form his underlying infection, mentation significantly improved and appears back to baseline her sitter at bedside  - 3% nebs and antibiotics  - PT/OT/ST and up out of bed as much as possible  - follow chest imaging as needed  - follow fever and WBC trends

## 2023-10-30 NOTE — PROGRESS NOTES
[facility]  Pulmonology  Progress Note    Patient Name: Juan Francisco Parker  MRN: [id number]  Admission Date: 10/24/2023  Hospital Length of Stay: 6 days  Code Status: Full Code  Attending Provider: Ghanshyam Penn MD  Primary Care Provider: Meaghan, Primary Doctor   Principal Problem: Sepsis    Subjective:     Mr Parker is a 58-year-old male with intellectual disability who lives in a group home, diabetes mellitus, hyperlipidemia, hypertension, and paranoid personality disorder who was originally admitted to the Hospitalist Service on 10/24 after presenting with altered mental status. He was being treated for UTI. He was more lethargic and having difficulty clearing secretions, prompting pulmonary consult.      At the time of our initial exam, he is alert and tracking, though not conversant.  He has a weak cough with some posterior pharyngeal secretions observed.  Oxygenation is stable on room air.    Interval history:   10/28: Patient awake and alert this morning; talking with staff in the room. Cough appears improved from previous days. On room air.    10/30: on RA in NAD and appears happy and in good spirits, sitter at bedside, discussed with Dr. Penn    ROS complete and negative unless stated in the interval HPI     Objective:     Vital Signs (Most Recent):  Temp: 98.5 °F (36.9 °C) (10/30/23 0731)  Pulse: 80 (10/30/23 0813)  Resp: 18 (10/30/23 0731)  BP: (!) 119/55 (10/30/23 0731)  SpO2: (!) 92 % (10/30/23 0731) Vital Signs (24h Range):  Temp:  [97.6 °F (36.4 °C)-99.3 °F (37.4 °C)] 98.5 °F (36.9 °C)  Pulse:  [73-81] 80  Resp:  [18-19] 18  SpO2:  [90 %-98 %] 92 %  BP: (108-128)/(52-65) 119/55     Weight: 74.3 kg (163 lb 12.8 oz)  Body mass index is 21.61 kg/m².    No intake or output data in the 24 hours ending 10/30/23 1008     Physical Exam  Vitals reviewed.   Constitutional:       General: He is awake. He is not in acute distress.     Appearance: He is not ill-appearing.   Pulmonary:      Effort:  "Pulmonary effort is normal. No tachypnea, bradypnea or respiratory distress.      Breath sounds: Normal breath sounds.      Comments: On RA  Neurological:      Mental Status: He is alert.   Psychiatric:         Behavior: Behavior is cooperative.                Lines/Drains/Airways       Peripheral Intravenous Line  Duration                  Midline Catheter Insertion/Assessment  - Single Lumen 10/29/23 2125 Right basilic vein (medial side of arm) other (see comments) <1 day                    Significant Labs:    CBC/Anemia Profile:  Recent Labs   Lab 10/29/23  0405   WBC 2.03*   HGB 8.5*   HCT 25.1*   PLT 59*   MCV 95   RDW 14.8*        Chemistries:  Recent Labs   Lab 10/29/23  0405   *   K 3.8      CO2 23   BUN 10   CREATININE 0.5   CALCIUM 7.5*   ALBUMIN 2.0*   PROT 4.9*   BILITOT 0.3   ALKPHOS 93   ALT 23   AST 15   MG 1.7   PHOS 3.5       All pertinent labs within the past 24 hours have been reviewed.    Significant Imaging:  I have reviewed all pertinent imaging results/findings within the past 24 hours.      ABG  No results for input(s): "PH", "PO2", "PCO2", "HCO3", "BE" in the last 168 hours.  Assessment/Plan:     Pulmonary  Weak cough  - appeared to be s/t AMS form his underlying infection, mentation significantly improved and appears back to baseline her sitter at bedside  - 3% nebs and antibiotics  - PT/OT/ST and up out of bed as much as possible  - follow chest imaging as needed  - follow fever and WBC trends    Pulmonary team will remain available, but not plan to see daily. Please call if we can be of assistance sooner or if questions should arise.       Jatinder Archuleta MD  Pulmonology  [facility]    "

## 2023-10-30 NOTE — ASSESSMENT & PLAN NOTE
"This patient does have evidence of infective focus  My overall impression is sepsis.  Source: Urinary Tract  Antibiotics given-   Antibiotics (72h ago, onward)    Start     Stop Route Frequency Ordered    10/29/23 1530  cefTRIAXone (ROCEPHIN) 1 g in dextrose 5 % in water (D5W) 100 mL IVPB (MB+)  ( Urinary Tract Infection (UTI))         11/01/23 1529 IV Every 24 hours (non-standard times) 10/29/23 0818        Latest lactate reviewed-  No results for input(s): "LACTATE" in the last 72 hours.  Organ dysfunction indicated by Encephalopathy    Fluid challenge Ideal Body Weight- The patient's ideal body weight is Ideal body weight: 79.9 kg (176 lb 2.4 oz) which will be used to calculate fluid bolus of 30 ml/kg for treatment of septic shock.      Post- resuscitation assessment No - Post resuscitation assessment not needed     Will Not start Pressors- Levophed for MAP of 65  Source control achieved by: Rocephin         "

## 2023-10-30 NOTE — CONSULTS
Hematology and Medical Oncology   New Patient Consult     10/30/2023    Reason For Referral: Pancytopenia    Telemedicine Documentation:  Visit type: audiovisual    This encounter includes face to face time and non-face to face time preparing to see the patient (eg, review of tests), Obtaining and/or reviewing separately obtained history, Documenting clinical information in the electronic or other health record, Independently interpreting results (not separately reported) and communicating results to the patient/family/caregiver, or Care coordination (not separately reported).     Each patient to whom he or she provides medical services by telemedicine is:  (1) informed of the relationship between the physician and patient and the respective role of any other health care provider with respect to management of the patient; and (2) notified that he or she may decline to receive medical services by telemedicine and may withdraw from such care at any time.        History of Present Ilness:   Juan Francisco Sheikh) is a pleasant 58 y.o.male who has been admitted for 5 days for sepsis, requiring resuscitation and bear hugger use. Treated with IV Rocephin.     This hospital stay has been complicated by decreased ability to communicate, decreased ability to swallow or to clear secretions.      Outside records from [facility] reveal sizeable fluctuations in both white count and hemoglobin.    Sitter at bedside provided most updates. Mr. Parker could only communicate that he was going to a new home.       PAST MEDICAL HISTORY:   Past Medical History:   Diagnosis Date    Diabetes mellitus, type 2 2004    BS didn't check 09/06/2022    Hyperlipidemia     Hypertension     Mental disability     Paranoid personality disorder     Peripheral neuropathy     Unspecified intellectual disabilities        PAST SURGICAL HISTORY:   History reviewed. No pertinent surgical history.    PAST SOCIAL HISTORY:   reports that he has  quit smoking. He has never used smokeless tobacco.    FAMILY HISTORY:  History reviewed. No pertinent family history.    CURRENT MEDICATIONS:   Current Facility-Administered Medications   Medication    acetaminophen tablet 650 mg    acetaminophen tablet 650 mg    albuterol-ipratropium 2.5 mg-0.5 mg/3 mL nebulizer solution 3 mL    atorvastatin tablet 10 mg    bisacodyL EC tablet 5 mg    cefTRIAXone (ROCEPHIN) 1 g in dextrose 5 % in water (D5W) 100 mL IVPB (MB+)    dextrose 10% bolus 125 mL 125 mL    dextrose 10% bolus 250 mL 250 mL    dextrose 5 % and 0.9 % NaCl infusion    divalproex 24 hr tablet 1,000 mg    divalproex ER 24 hr tablet 250 mg    FLUoxetine capsule 40 mg    furosemide injection 40 mg    gabapentin capsule 300 mg    glucagon (human recombinant) injection 1 mg    glucose chewable tablet 16 g    glucose chewable tablet 24 g    lactulose 20 gram/30 mL solution Soln 10 g    ondansetron injection 4 mg    OXcarbazepine tablet 600 mg    polyethylene glycol packet 17 g    potassium chloride SA CR tablet 20 mEq    promethazine tablet 25 mg    QUEtiapine tablet 400 mg    risperiDONE tablet 0.5 mg    sodium chloride 0.9% flush 10 mL    And    sodium chloride 0.9% flush 10 mL    tamsulosin 24 hr capsule 0.4 mg     ALLERGIES:   Review of patient's allergies indicates:   Allergen Reactions    Cephalexin          Review of Systems:     Review of Systems   Constitutional:  Positive for fatigue. Negative for appetite change, chills, diaphoresis, fever and unexpected weight change.   HENT:   Negative for hearing loss, mouth sores, nosebleeds, sore throat, trouble swallowing and voice change.    Eyes:  Negative for eye problems and icterus.   Respiratory:  Negative for chest tightness, cough, hemoptysis, shortness of breath and wheezing.    Cardiovascular:  Negative for chest pain, leg swelling and palpitations.   Gastrointestinal:  Negative for abdominal distention, abdominal pain, blood in stool, diarrhea, nausea and  vomiting.   Endocrine: Negative for hot flashes.   Genitourinary:  Negative for bladder incontinence, difficulty urinating, dysuria and hematuria.    Musculoskeletal:  Negative for arthralgias, back pain, flank pain, gait problem, myalgias, neck pain and neck stiffness.   Skin:  Negative for itching, rash and wound.   Neurological:  Positive for extremity weakness and speech difficulty. Negative for dizziness, gait problem, headaches, numbness and seizures.   Hematological:  Negative for adenopathy. Does not bruise/bleed easily.   Psychiatric/Behavioral:  Negative for confusion, depression and sleep disturbance. The patient is not nervous/anxious.           Physical Exam:     Vitals:    10/30/23 0411   BP: (!) 108/52   Pulse: 79   Resp: 18   Temp: 99.3 °F (37.4 °C)     Limited secondary to virtual visit    ECOG Performance Status: (foot note - ECOG PS provided by Eastern Cooperative Oncology Group) 2 - Symptomatic, <50% confined to bed    Karnofsky Performance Score:  60%- Requires Occasional Assistance but is Able to Care for Needs    Labs:   Recent Labs   Lab 10/27/23  0421 10/28/23  0435 10/29/23  0405   WBC 2.96* 3.78* 2.03*   HGB 9.9* 9.5* 8.5*   HCT 30.5* 28.0* 25.1*   PLT 58* 67* 59*       Recent Labs   Lab 10/27/23  0421 10/28/23  0435 10/29/23  0405    135* 134*   K 4.0 3.8 3.8    100 102   CO2 21* 23 23   BUN 14 12 10   CREATININE 0.6 0.6 0.5   CALCIUM 8.2* 8.0* 7.5*   PROT 5.6* 5.3* 4.9*   BILITOT 0.4 0.4 0.3   ALKPHOS 98 98 93   ALT 27 25 23   AST 24 18 15   MG 1.8 1.7 1.7   PHOS 4.1 4.0 3.5         Assessment and Plan:     Mr. Parker is a 58 year old man with pancytopenia    Neutropenia  --Present throughout this hospital stay  --ANC fluctuating but remains above 1000  --Both risperidone and quetiapine are known to decrease white count    Anemia  --Mild on admission  --Suspect this is partly iatrogenic from fluid resuscitation and repeat lab checks  --Given decreased ability to swallow or  maintain blood glucose I suspect there is poor general nutritional status  --Will check vitamin and trace element labs    Thrombocytopenia  --Appears new compared to historic labs, but present on admission  --Please obtain CT abdomen to assess spleen size  --No indication for transfusion at this time    Recommendation:  --With next lab draw please check: pathologist interpretation of peripheral slide, Iron, ferritin, B12, methylmalonic acid, folate, batsheva, zinc, retic    I have provided the patient with an opportunity to ask questions and have all questions answered to his satisfaction.     Thank you for the consult. We will continue to follow with you.        Berna Carter MD  Hematology and Medical Oncology  [facility]  [facility]

## 2023-10-30 NOTE — SUBJECTIVE & OBJECTIVE
Mr Parker is a 58-year-old male with intellectual disability who lives in a group home, diabetes mellitus, hyperlipidemia, hypertension, and paranoid personality disorder who was originally admitted to the Hospitalist Service on 10/24 after presenting with altered mental status. He was being treated for UTI. He was more lethargic and having difficulty clearing secretions, prompting pulmonary consult.      At the time of our initial exam, he is alert and tracking, though not conversant.  He has a weak cough with some posterior pharyngeal secretions observed.  Oxygenation is stable on room air.    Interval history:  10/28: Patient awake and alert this morning; talking with staff in the room. Cough appears improved from previous days. On room air.   10/30: on RA in NAD and appears happy and in good spirits, sitter at bedside, discussed with Dr. Fili ZULUAGA complete and negative unless stated in the interval HPI     Objective:     Vital Signs (Most Recent):  Temp: 98.5 °F (36.9 °C) (10/30/23 0731)  Pulse: 80 (10/30/23 0813)  Resp: 18 (10/30/23 0731)  BP: (!) 119/55 (10/30/23 0731)  SpO2: (!) 92 % (10/30/23 0731) Vital Signs (24h Range):  Temp:  [97.6 °F (36.4 °C)-99.3 °F (37.4 °C)] 98.5 °F (36.9 °C)  Pulse:  [73-81] 80  Resp:  [18-19] 18  SpO2:  [90 %-98 %] 92 %  BP: (108-128)/(52-65) 119/55     Weight: 74.3 kg (163 lb 12.8 oz)  Body mass index is 21.61 kg/m².    No intake or output data in the 24 hours ending 10/30/23 1008     Physical Exam  Vitals reviewed.   Constitutional:       General: He is awake. He is not in acute distress.     Appearance: He is not ill-appearing.   Pulmonary:      Effort: Pulmonary effort is normal. No tachypnea, bradypnea or respiratory distress.      Breath sounds: Normal breath sounds.      Comments: On RA  Neurological:      Mental Status: He is alert.   Psychiatric:         Behavior: Behavior is cooperative.                Lines/Drains/Airways       Peripheral Intravenous Line   Duration                  Midline Catheter Insertion/Assessment  - Single Lumen 10/29/23 2125 Right basilic vein (medial side of arm) other (see comments) <1 day                    Significant Labs:    CBC/Anemia Profile:  Recent Labs   Lab 10/29/23  0405   WBC 2.03*   HGB 8.5*   HCT 25.1*   PLT 59*   MCV 95   RDW 14.8*        Chemistries:  Recent Labs   Lab 10/29/23  0405   *   K 3.8      CO2 23   BUN 10   CREATININE 0.5   CALCIUM 7.5*   ALBUMIN 2.0*   PROT 4.9*   BILITOT 0.3   ALKPHOS 93   ALT 23   AST 15   MG 1.7   PHOS 3.5       All pertinent labs within the past 24 hours have been reviewed.    Significant Imaging:  I have reviewed all pertinent imaging results/findings within the past 24 hours.

## 2023-10-30 NOTE — NURSING
Pt had an episode of emesis. PRN zofran given.Pt is a little drowsy, but is responding to questions and following commands. Notified Dr. Ghanshyam Penn. MD stated she will reassess pt. Plan of care ongoing.

## 2023-10-30 NOTE — ASSESSMENT & PLAN NOTE
Patient's FSGs are controlled on current medication regimen.  Last A1c reviewed-   Lab Results   Component Value Date    HGBA1C 5.1 12/10/2021     Most recent fingerstick glucose reviewed-   Recent Labs   Lab 10/29/23  2111 10/30/23  1221 10/30/23  1710   POCTGLUCOSE 45* 81 84     Current correctional scale  none  Maintain anti-hyperglycemic dose as follows-   Antihyperglycemics (From admission, onward)    None        Hold Oral hypoglycemics while patient is in the hospital.

## 2023-10-30 NOTE — ASSESSMENT & PLAN NOTE
This patient is found to have pancytopenia, the likely etiology is mulitfactorial, will monitor CBC Daily. Will transfuse red blood cells if the hemoglobin is <7g/dL (or <8 in the setting of ACS). Will transfuse platelets if platelet count is <50k (if undergoing surgical procedure or have active bleeding). Hold DVT prophylaxis if platelets are <50k. The patient's hemoglobin, white blood cell count, and platelet count results have been reviewed and are listed below.  Recent Labs   Lab 10/30/23  0626   HGB 9.1*   WBC 2.69*   *       - Hematology evaluated, labs ordered as recommended

## 2023-10-30 NOTE — SUBJECTIVE & OBJECTIVE
Interval History: patient talking about Halloween and the weather this morning    Review of Systems  Objective:     Vital Signs (Most Recent):  Temp: 98 °F (36.7 °C) (10/30/23 1658)  Pulse: 68 (10/30/23 1718)  Resp: 18 (10/30/23 1658)  BP: (!) 129/59 (10/30/23 1718)  SpO2: 95 % (10/30/23 1718) Vital Signs (24h Range):  Temp:  [97.8 °F (36.6 °C)-99.3 °F (37.4 °C)] 98 °F (36.7 °C)  Pulse:  [68-84] 68  Resp:  [18-19] 18  SpO2:  [86 %-96 %] 95 %  BP: ()/(52-65) 129/59     Weight: 74.3 kg (163 lb 12.8 oz)  Body mass index is 21.61 kg/m².  No intake or output data in the 24 hours ending 10/30/23 1741      Physical Exam  HENT:      Head: Normocephalic and atraumatic.   Cardiovascular:      Rate and Rhythm: Normal rate and regular rhythm.      Heart sounds: No murmur heard.  Pulmonary:      Effort: Pulmonary effort is normal. No respiratory distress.      Breath sounds: Normal breath sounds. No wheezing.   Abdominal:      General: Bowel sounds are normal. There is no distension.      Palpations: Abdomen is soft.      Tenderness: There is no abdominal tenderness.   Musculoskeletal:         General: No swelling.   Skin:     General: Skin is warm and dry.   Neurological:      Mental Status: He is alert. Mental status is at baseline.             Significant Labs: All pertinent labs within the past 24 hours have been reviewed.  CBC:   Recent Labs   Lab 10/29/23  0405 10/30/23  0626   WBC 2.03* 2.69*   HGB 8.5* 9.1*   HCT 25.1* 27.0*   PLT 59* 120*     CMP:   Recent Labs   Lab 10/29/23  0405 10/30/23  0626   * 135*   K 3.8 4.0    99   CO2 23 23   GLU 89 52*   BUN 10 16   CREATININE 0.5 0.6   CALCIUM 7.5* 8.2*   PROT 4.9* 5.4*   ALBUMIN 2.0* 2.3*   BILITOT 0.3 0.3   ALKPHOS 93 92   AST 15 20   ALT 23 22   ANIONGAP 9 13       Significant Imaging: I have reviewed all pertinent imaging results/findings within the past 24 hours.

## 2023-10-30 NOTE — PT/OT/SLP PROGRESS
Speech Language Pathology Treatment    Patient Name:  Juan Francisco Parker   MRN:  [id number]  Admitting Diagnosis: Sepsis    Recommendations:                 General Recommendations:  Follow-up not indicated  Diet recommendations:  Minced & Moist Diet - IDDSI Level 5, Liquid Diet Level: Thin liquids - IDDSI Level 0   Aspiration Precautions: 1 bite/sip at a time, Assistance with meals, Check for pocketing/oral residue, Eliminate distractions, Feed only when awake/alert, Frequent oral care, HOB to 90 degrees, Remain upright 30 minutes post meal, and Small bites/sips   General Precautions: Standard, aspiration  Communication strategies:  provide increased time to answer    Subjective     Patient was alert and upright in bed upon arrival and immediately greeted SLP. Caregiver was present throughout and reported significant improvement in overall status and thanked staff. Patient stated he did not want to go back to his previous residence and instead would like to return home with caregiver.   Patient goals: to return home with caregiver at bedside, no communication or swallowing goals assessed. Patient doing well with current diet.     Pain/Comfort:  Pain Rating 1: 0/10  Pain Rating Post-Intervention 1: 0/10  Pain Rating 2: 0/10  Pain Rating Post-Intervention 2: 0/10    Respiratory Status: Room air    Objective:     Has the patient been evaluated by SLP for swallowing?   Yes  Keep patient NPO? No   Current Respiratory Status:   room  air     Bedside Clinical Swallow Evaluation:      Clinical Swallow Examination:   Of note, patient self-fed with hand/hand assist by SLP throughout evaluation. UE weakness noted.  Dry cough present in the absence of PO intake, intermittently.  Patient presented with:      CONSISTENCY   NOTES   THIN (IDDSI 0) Water cup/straw  Controlled and sequential swallows    No overt s/s of aspiration   PUREE (IDDSI 4/Extremely Thick)    TSP/TBSP bites of applesauce  No overt s/s of aspiration   SOFT BITE SIZED  SOLID (IDDSI 6 Soft and Bite Sized) Daniel Cracker No overt s/s of aspiration.  Missing/scattered dentition.  Reduced efficiency with trace lingual residue post deglutition, which cleared with liquid rinse.      Thickened liquids were not used in this assessment. Miriam (2018) reported that thickened liquids have no sound evidence at reducing the risk of pneumonia in patients with dysphagia and can cause harm by increasing their risk of dehydration. It also presents an increased risk of UTI, electrolyte imbalance, constipation, fecal impaction, cognitive impairment, functional decline and even death (Langmore, 2002; Jaelyn, 2016).  Thickened liquids are associated with risks including dehydration, increased pharyngeal residue, potential interference with medication absorption, and decreased quality of life (Mariana, 2013). Thickened liquids are also more likely to be silently aspirated than thin liquids (Solis et al., 2018). This supports the assertion that we should confirm a patient requires thickened liquids with an instrumental swallow study prior to recommending them.     References:   Mariana LINN (2013). Thickening agents used for dysphagia management: Effect on bioavailability of water, medication and feelings of satiety. Nutrition Journal, 12, 54. https://doi.org/10.1186/1633-5278-39-54     GERA Ely., CRISTELA Blanchard., GRACE Anderson., & GERA Dejesus. (2018). Cough response to aspiration in thin and thick fluids during FEES in hospitalized inpatients. International journal of language & communication disorders, 53(5), 909-918. https://doi.org/10.1111/5494-4590.55231     INTERPRETATION AND RISK ASSESSMENT:  Clinical swallow evaluation (CSE) revealed oral phase characterized by lingual, labial, and buccal strength and range of motion reduced for lip closure, bolus preparation and propulsion. The patient had no anterior loss of the bolus with complete closure of the lips around the utensils. Trace solid residue  remained in the oral cavity following the swallow. Patient without overt clinical signs/symptoms of aspiration on any PO trials given. Patient presents with a possibly inefficient swallow as indicated by scattered/missing dentition, weakness and chronic constipation. Contributing risk factors for dysphagia include cognitive deficits related to intellectual disability and advancing age.      Clinical signs of oropharyngeal dysphagia, likely chronic related to cognitive and medical co-morbidities with reported functional decline. Swallowing prognosis is guarded, warranting ACP/goals of care discussion d/t increased risk of aspiration/dysphagia with advancing age, functional decline and chronic co-morbidities.        Compensatory Strategies  Aspiration precautions  Feed only when awake/alert  Oral care 2-3x/daily       Assessment:     Juan Francisco Parker is a 58 y.o. male, group home resident with an SLP diagnosis of suspected Dysphagia in the setting of sepsis and AMS s/t UTI and constipation.  He presents with continued improvement in RONALD and back to baseline communication with hx developmental/intellectual disability and psychiatric co-morbidities.  Pt exhibited reduced efficiency during bedside CSE s/t missing dentition and generalized weakness with recommendations for IDDSI 5-minced/moist solids and IDDSI 0-thin liquids, following aspiration precautions, oral care/hygiene and 1:1 feeder assist.  Pt and POA would also benefit from ACP/goals of care discussion s/t increased risk of aspiration/dysphagia, advancing age and functional decline in the setting of chronic cognitive and medical co-morbidities. No further acute SLP intervention at this time, MD to re-consult if needed.     Goals: MET  Multidisciplinary Problems       SLP Goals       Not on file              Multidisciplinary Problems (Resolved)          Problem: SLP    Goal Priority Disciplines Outcome   SLP Goal   (Resolved)     SLP Met   Description: 1.  Pt  will consume IDDSI 5-minced/moist solids and IDDSI 0-thin liquids without incident.    2.  Education with caregiver on dysphagia/aspiration risks related to chronic cognitive/intellectual disability, warranting ACP/goals of care discussion.                       Plan:     Patient to be seen:  2 x/week   Plan of Care expires:   (Goal MET d/c ST)  Plan of Care reviewed with:  caregiver, patient   SLP Follow-Up:  No       Discharge recommendations:  No Therapy Indicated   Barriers to Discharge:  None    Time Tracking:     SLP Treatment Date:   10/30/23  Speech Start Time:  1000  Speech Stop Time:  1030     Speech Total Time (min):  30 min    Billable Minutes: Treatment Swallowing Dysfunction 15 and Self Care/Home Management Training 15    10/30/2023    NADINE Camara  Student Speech Language Pathologist     Leanna ABURTO CCC-SLP  Speech-Language Pathologist

## 2023-10-30 NOTE — PLAN OF CARE
Problem: Adult Inpatient Plan of Care  Goal: Plan of Care Review  Outcome: Ongoing, Progressing  Goal: Patient-Specific Goal (Individualized)  Outcome: Ongoing, Progressing  Goal: Absence of Hospital-Acquired Illness or Injury  Outcome: Ongoing, Progressing  Goal: Optimal Comfort and Wellbeing  Outcome: Ongoing, Progressing  Goal: Readiness for Transition of Care  Outcome: Ongoing, Progressing     Problem: Diabetes Comorbidity  Goal: Blood Glucose Level Within Targeted Range  Outcome: Ongoing, Progressing     Problem: Adjustment to Illness (Sepsis/Septic Shock)  Goal: Optimal Coping  Outcome: Ongoing, Progressing     Problem: Bleeding (Sepsis/Septic Shock)  Goal: Absence of Bleeding  Outcome: Ongoing, Progressing     Problem: Glycemic Control Impaired (Sepsis/Septic Shock)  Goal: Blood Glucose Level Within Desired Range  Outcome: Ongoing, Progressing     Problem: Infection Progression (Sepsis/Septic Shock)  Goal: Absence of Infection Signs and Symptoms  Outcome: Ongoing, Progressing     Problem: Nutrition Impaired (Sepsis/Septic Shock)  Goal: Optimal Nutrition Intake  Outcome: Ongoing, Progressing     Problem: Skin Injury Risk Increased  Goal: Skin Health and Integrity  Outcome: Ongoing, Progressing     Problem: Impaired Wound Healing  Goal: Optimal Wound Healing  Outcome: Ongoing, Progressing     Problem: Fall Injury Risk  Goal: Absence of Fall and Fall-Related Injury  Outcome: Ongoing, Progressing     Problem: UTI (Urinary Tract Infection)  Goal: Improved Infection Symptoms  Outcome: Ongoing, Progressing     Problem: Infection  Goal: Absence of Infection Signs and Symptoms  Outcome: Ongoing, Progressing

## 2023-10-30 NOTE — PROGRESS NOTES
[facility]  Hospital Medicine  Progress Note    Patient Name: Juan Francisco Parker  MRN: [id number]  Patient Class: IP- Inpatient   Admission Date: 10/24/2023  Length of Stay: 6 days  Attending Physician: Ghanshyam Penn MD  Primary Care Provider: [facility] Primary Doctor        Subjective:     Principal Problem:Sepsis        HPI:  Juan Francisco Parker is a 58 y.o. male with a PMH  has a past medical history of Diabetes mellitus, type 2 (2004), Hyperlipidemia, Hypertension, Mental disability, Paranoid personality disorder, Peripheral neuropathy, and Unspecified intellectual disabilities. who presented to the ED to the ED via EMS for further evaluation of worsening altered mental status since this morning.  History mainly obtained through chart review, ED sign-out, and caregiver at bedside as patient has underlying intellectual disability and only able to provide minimal information.  Patient has history of recurrent UTIs and was complaining of belly pain over the past few days.  Caregiver unsure of patient's last bowel movement but has continued to eat pureed food and drink fluids.  Patient usually ambulates with aid of wheelchair but is able to stand and walk around but has been mostly bed-bound the past few days.  No known alleviating or aggravating factors with noted with all other review of systems negative except as stated above.  Initial workup in the ED revealed patient met SIRS criteria for sepsis with UA positive for UTI.  Patient initiated on sepsis protocol and continued on Rocephin with cultures obtained and pending.  Patient being admitted to Hospital Medicine inpatient for continued medical management.       PCP: [facility] Primary Doctor        Overview/Hospital Course:  Mr Parker was initially admitted with severe sepsis requiring fluid resuscitation and a leela hugger.  With improvement in BP the fluids were stopped and with improvements in his temp the leela hugger has been weaned, requiring intermittent restarts.   On day 2 of hospitalization patient because lethargic  He appeared to be trying to cough up mucus but due to extreme weakness was not able to do so.  After aggressive suctioning he was able to have some mucus removed, but continued to have a weak cough. Pulmonology  was consulted.  The patient was started on 3% saline nebs to help with the congestion.  Chest xray Bi basilar low-grade infiltrates and/or atelectasis.  PT/OT/ST  - dysphagia diet and SNF.  Patient is also continued on empiric Rocephin for his UTI and possible pulmonary infection.  Urine culture Providencia rettgeri. Hematology consulted due to pancytopenia. Labs ordered as requested. His mental status improved.       Interval History: patient talking about Halloween and the weather this morning    Review of Systems  Objective:     Vital Signs (Most Recent):  Temp: 98 °F (36.7 °C) (10/30/23 1658)  Pulse: 68 (10/30/23 1718)  Resp: 18 (10/30/23 1658)  BP: (!) 129/59 (10/30/23 1718)  SpO2: 95 % (10/30/23 1718) Vital Signs (24h Range):  Temp:  [97.8 °F (36.6 °C)-99.3 °F (37.4 °C)] 98 °F (36.7 °C)  Pulse:  [68-84] 68  Resp:  [18-19] 18  SpO2:  [86 %-96 %] 95 %  BP: ()/(52-65) 129/59     Weight: 74.3 kg (163 lb 12.8 oz)  Body mass index is 21.61 kg/m².  No intake or output data in the 24 hours ending 10/30/23 1741      Physical Exam  HENT:      Head: Normocephalic and atraumatic.   Cardiovascular:      Rate and Rhythm: Normal rate and regular rhythm.      Heart sounds: No murmur heard.  Pulmonary:      Effort: Pulmonary effort is normal. No respiratory distress.      Breath sounds: Normal breath sounds. No wheezing.   Abdominal:      General: Bowel sounds are normal. There is no distension.      Palpations: Abdomen is soft.      Tenderness: There is no abdominal tenderness.   Musculoskeletal:         General: No swelling.   Skin:     General: Skin is warm and dry.   Neurological:      Mental Status: He is alert. Mental status is at baseline.          "    Significant Labs: All pertinent labs within the past 24 hours have been reviewed.  CBC:   Recent Labs   Lab 10/29/23  0405 10/30/23  0626   WBC 2.03* 2.69*   HGB 8.5* 9.1*   HCT 25.1* 27.0*   PLT 59* 120*     CMP:   Recent Labs   Lab 10/29/23  0405 10/30/23  0626   * 135*   K 3.8 4.0    99   CO2 23 23   GLU 89 52*   BUN 10 16   CREATININE 0.5 0.6   CALCIUM 7.5* 8.2*   PROT 4.9* 5.4*   ALBUMIN 2.0* 2.3*   BILITOT 0.3 0.3   ALKPHOS 93 92   AST 15 20   ALT 23 22   ANIONGAP 9 13       Significant Imaging: I have reviewed all pertinent imaging results/findings within the past 24 hours.      Assessment/Plan:      * Sepsis  This patient does have evidence of infective focus  My overall impression is sepsis.  Source: Urinary Tract  Antibiotics given-   Antibiotics (72h ago, onward)    Start     Stop Route Frequency Ordered    10/29/23 1530  cefTRIAXone (ROCEPHIN) 1 g in dextrose 5 % in water (D5W) 100 mL IVPB (MB+)  ( Urinary Tract Infection (UTI))         11/01/23 1529 IV Every 24 hours (non-standard times) 10/29/23 0818        Latest lactate reviewed-  No results for input(s): "LACTATE" in the last 72 hours.  Organ dysfunction indicated by Encephalopathy    Fluid challenge Ideal Body Weight- The patient's ideal body weight is Ideal body weight: 79.9 kg (176 lb 2.4 oz) which will be used to calculate fluid bolus of 30 ml/kg for treatment of septic shock.      Post- resuscitation assessment No - Post resuscitation assessment not needed     Will Not start Pressors- Levophed for MAP of 65  Source control achieved by: Rocephin           Hypoglycemia  - currently on D5 1/2NS (BG 80s on fluids)  -increase rate  -monitor      Pancytopenia  This patient is found to have pancytopenia, the likely etiology is mulitfactorial, will monitor CBC Daily. Will transfuse red blood cells if the hemoglobin is <7g/dL (or <8 in the setting of ACS). Will transfuse platelets if platelet count is <50k (if undergoing surgical " procedure or have active bleeding). Hold DVT prophylaxis if platelets are <50k. The patient's hemoglobin, white blood cell count, and platelet count results have been reviewed and are listed below.  Recent Labs   Lab 10/30/23  0626   HGB 9.1*   WBC 2.69*   *       - Hematology evaluated, labs ordered as recommended    Weak cough  - Appreciate pulmonary assistance  - 3% saline nebs  -dysphagia diet    History of DVT (deep vein thrombosis)  -holding Eliquis due to thrombocytopenia      Neuropathy  -continue gabapentin      Anxiety and depression  -Continue home medications       Benign prostatic hyperplasia  Currently on Flomax outpatient.  Plan:  -continue home medication      Hyperlipidemia  -continue statin      Controlled type 2 diabetes mellitus, without long-term current use of insulin  Patient's FSGs are controlled on current medication regimen.  Last A1c reviewed-   Lab Results   Component Value Date    HGBA1C 5.1 12/10/2021     Most recent fingerstick glucose reviewed-   Recent Labs   Lab 10/29/23  2111 10/30/23  1221 10/30/23  1710   POCTGLUCOSE 45* 81 84     Current correctional scale  none  Maintain anti-hyperglycemic dose as follows-   Antihyperglycemics (From admission, onward)    None        Hold Oral hypoglycemics while patient is in the hospital.        Primary hypertension  -continue IV lasix  -monitor blood pressure      Intellectual disability  - care taker from [facility] at bedside.        VTE Risk Mitigation (From admission, onward)         Ordered     Place sequential compression device  Until discontinued         10/24/23 1907     IP VTE LOW RISK PATIENT  Once         10/24/23 1907                Discharge Planning   GEOFF:      Code Status: Full Code   Is the patient medically ready for discharge?:     Reason for patient still in hospital (select all that apply): Patient trending condition, Laboratory test, Treatment, Consult recommendations and PT / OT recommendations  Discharge Plan A:  Group Home                  Ghanshyam Penn MD  Department of Hospital Medicine   [facility]

## 2023-10-31 LAB
ALBUMIN SERPL BCP-MCNC: 2.6 G/DL (ref 3.5–5.2)
ALP SERPL-CCNC: 102 U/L (ref 55–135)
ALT SERPL W/O P-5'-P-CCNC: 21 U/L (ref 10–44)
ANION GAP SERPL CALC-SCNC: 13 MMOL/L (ref 8–16)
AST SERPL-CCNC: 20 U/L (ref 10–40)
BASOPHILS # BLD AUTO: 0.01 K/UL (ref 0–0.2)
BASOPHILS NFR BLD: 0.5 % (ref 0–1.9)
BILIRUB SERPL-MCNC: 0.3 MG/DL (ref 0.1–1)
BUN SERPL-MCNC: 14 MG/DL (ref 6–20)
CALCIUM SERPL-MCNC: 8.5 MG/DL (ref 8.7–10.5)
CHLORIDE SERPL-SCNC: 100 MMOL/L (ref 95–110)
CO2 SERPL-SCNC: 27 MMOL/L (ref 23–29)
CREAT SERPL-MCNC: 0.6 MG/DL (ref 0.5–1.4)
DIFFERENTIAL METHOD: ABNORMAL
EOSINOPHIL # BLD AUTO: 0.1 K/UL (ref 0–0.5)
EOSINOPHIL NFR BLD: 3.6 % (ref 0–8)
ERYTHROCYTE [DISTWIDTH] IN BLOOD BY AUTOMATED COUNT: 15.4 % (ref 11.5–14.5)
EST. GFR  (NO RACE VARIABLE): >60 ML/MIN/1.73 M^2
FERRITIN SERPL-MCNC: 220 NG/ML (ref 20–300)
FOLATE SERPL-MCNC: 15.1 NG/ML (ref 4–24)
GLUCOSE SERPL-MCNC: 73 MG/DL (ref 70–110)
HCT VFR BLD AUTO: 32.4 % (ref 40–54)
HGB BLD-MCNC: 10.6 G/DL (ref 14–18)
IMM GRANULOCYTES # BLD AUTO: 0.04 K/UL (ref 0–0.04)
IMM GRANULOCYTES NFR BLD AUTO: 1.8 % (ref 0–0.5)
IRON SERPL-MCNC: 102 UG/DL (ref 45–160)
LYMPHOCYTES # BLD AUTO: 0.6 K/UL (ref 1–4.8)
LYMPHOCYTES NFR BLD: 26.6 % (ref 18–48)
MAGNESIUM SERPL-MCNC: 2 MG/DL (ref 1.6–2.6)
MCH RBC QN AUTO: 31.7 PG (ref 27–31)
MCHC RBC AUTO-ENTMCNC: 32.7 G/DL (ref 32–36)
MCV RBC AUTO: 97 FL (ref 82–98)
MONOCYTES # BLD AUTO: 0.4 K/UL (ref 0.3–1)
MONOCYTES NFR BLD: 15.8 % (ref 4–15)
NEUTROPHILS # BLD AUTO: 1.2 K/UL (ref 1.8–7.7)
NEUTROPHILS NFR BLD: 51.7 % (ref 38–73)
NRBC BLD-RTO: 0 /100 WBC
PATH REV BLD -IMP: NORMAL
PHOSPHATE SERPL-MCNC: 3.5 MG/DL (ref 2.7–4.5)
PLATELET # BLD AUTO: 146 K/UL (ref 150–450)
PMV BLD AUTO: 10.1 FL (ref 9.2–12.9)
POCT GLUCOSE: 108 MG/DL (ref 70–110)
POCT GLUCOSE: 137 MG/DL (ref 70–110)
POCT GLUCOSE: 62 MG/DL (ref 70–110)
POCT GLUCOSE: 63 MG/DL (ref 70–110)
POCT GLUCOSE: 75 MG/DL (ref 70–110)
POTASSIUM SERPL-SCNC: 3.7 MMOL/L (ref 3.5–5.1)
PROT SERPL-MCNC: 6.3 G/DL (ref 6–8.4)
RBC # BLD AUTO: 3.34 M/UL (ref 4.6–6.2)
RETICS/RBC NFR AUTO: 3.8 % (ref 0.4–2)
SATURATED IRON: 35 % (ref 20–50)
SODIUM SERPL-SCNC: 140 MMOL/L (ref 136–145)
TOTAL IRON BINDING CAPACITY: 292 UG/DL (ref 250–450)
TRANSFERRIN SERPL-MCNC: 197 MG/DL (ref 200–375)
VIT B12 SERPL-MCNC: >2000 PG/ML (ref 210–950)
WBC # BLD AUTO: 2.22 K/UL (ref 3.9–12.7)

## 2023-10-31 PROCEDURE — 97116 GAIT TRAINING THERAPY: CPT

## 2023-10-31 PROCEDURE — 82746 ASSAY OF FOLIC ACID SERUM: CPT | Performed by: INTERNAL MEDICINE

## 2023-10-31 PROCEDURE — 83735 ASSAY OF MAGNESIUM: CPT | Performed by: HOSPITALIST

## 2023-10-31 PROCEDURE — 97535 SELF CARE MNGMENT TRAINING: CPT

## 2023-10-31 PROCEDURE — 83921 ORGANIC ACID SINGLE QUANT: CPT | Performed by: INTERNAL MEDICINE

## 2023-10-31 PROCEDURE — 25000003 PHARM REV CODE 250: Performed by: HOSPITALIST

## 2023-10-31 PROCEDURE — 25000003 PHARM REV CODE 250: Performed by: FAMILY MEDICINE

## 2023-10-31 PROCEDURE — 84630 ASSAY OF ZINC: CPT | Performed by: INTERNAL MEDICINE

## 2023-10-31 PROCEDURE — 84100 ASSAY OF PHOSPHORUS: CPT | Performed by: HOSPITALIST

## 2023-10-31 PROCEDURE — 84466 ASSAY OF TRANSFERRIN: CPT | Performed by: INTERNAL MEDICINE

## 2023-10-31 PROCEDURE — 63600175 PHARM REV CODE 636 W HCPCS: Performed by: FAMILY MEDICINE

## 2023-10-31 PROCEDURE — 82728 ASSAY OF FERRITIN: CPT | Performed by: INTERNAL MEDICINE

## 2023-10-31 PROCEDURE — 97530 THERAPEUTIC ACTIVITIES: CPT

## 2023-10-31 PROCEDURE — 85045 AUTOMATED RETICULOCYTE COUNT: CPT | Performed by: INTERNAL MEDICINE

## 2023-10-31 PROCEDURE — 83540 ASSAY OF IRON: CPT | Performed by: INTERNAL MEDICINE

## 2023-10-31 PROCEDURE — 82525 ASSAY OF COPPER: CPT | Performed by: INTERNAL MEDICINE

## 2023-10-31 PROCEDURE — 82607 VITAMIN B-12: CPT | Performed by: INTERNAL MEDICINE

## 2023-10-31 PROCEDURE — 63600175 PHARM REV CODE 636 W HCPCS: Performed by: INTERNAL MEDICINE

## 2023-10-31 PROCEDURE — 85025 COMPLETE CBC W/AUTO DIFF WBC: CPT | Performed by: HOSPITALIST

## 2023-10-31 PROCEDURE — 11000001 HC ACUTE MED/SURG PRIVATE ROOM

## 2023-10-31 PROCEDURE — 80053 COMPREHEN METABOLIC PANEL: CPT | Performed by: HOSPITALIST

## 2023-10-31 RX ADMIN — Medication 16 G: at 05:10

## 2023-10-31 RX ADMIN — DIVALPROEX SODIUM 250 MG: 500 TABLET, FILM COATED, EXTENDED RELEASE ORAL at 09:10

## 2023-10-31 RX ADMIN — POTASSIUM CHLORIDE 20 MEQ: 1500 TABLET, EXTENDED RELEASE ORAL at 08:10

## 2023-10-31 RX ADMIN — RISPERIDONE 0.5 MG: 0.5 TABLET ORAL at 09:10

## 2023-10-31 RX ADMIN — POTASSIUM CHLORIDE 20 MEQ: 1500 TABLET, EXTENDED RELEASE ORAL at 09:10

## 2023-10-31 RX ADMIN — DEXTROSE AND SODIUM CHLORIDE: 5; 900 INJECTION, SOLUTION INTRAVENOUS at 05:10

## 2023-10-31 RX ADMIN — RISPERIDONE 0.5 MG: 0.5 TABLET ORAL at 08:10

## 2023-10-31 RX ADMIN — OXCARBAZEPINE 600 MG: 150 TABLET, FILM COATED ORAL at 08:10

## 2023-10-31 RX ADMIN — QUETIAPINE FUMARATE 400 MG: 100 TABLET ORAL at 08:10

## 2023-10-31 RX ADMIN — DIVALPROEX SODIUM 1000 MG: 500 TABLET, FILM COATED, EXTENDED RELEASE ORAL at 08:10

## 2023-10-31 RX ADMIN — CEFTRIAXONE SODIUM 1 G: 1 INJECTION, POWDER, FOR SOLUTION INTRAMUSCULAR; INTRAVENOUS at 03:10

## 2023-10-31 RX ADMIN — GABAPENTIN 300 MG: 300 CAPSULE ORAL at 09:10

## 2023-10-31 RX ADMIN — GABAPENTIN 300 MG: 300 CAPSULE ORAL at 03:10

## 2023-10-31 RX ADMIN — TAMSULOSIN HYDROCHLORIDE 0.4 MG: 0.4 CAPSULE ORAL at 09:10

## 2023-10-31 RX ADMIN — OXCARBAZEPINE 600 MG: 150 TABLET, FILM COATED ORAL at 09:10

## 2023-10-31 RX ADMIN — FLUOXETINE 40 MG: 20 CAPSULE ORAL at 09:10

## 2023-10-31 RX ADMIN — GABAPENTIN 300 MG: 300 CAPSULE ORAL at 08:10

## 2023-10-31 RX ADMIN — ATORVASTATIN CALCIUM 10 MG: 10 TABLET, FILM COATED ORAL at 09:10

## 2023-10-31 NOTE — PLAN OF CARE
Pt tolerated interventions well. Required SBA for bed mobility, ambulated 120ft MOD A using RW. Recommending moderate intensity PT upon d/c.

## 2023-10-31 NOTE — ASSESSMENT & PLAN NOTE
-holding Eliquis due to thrombocytopenia  -anticipate resumption of Eliquis tomorrow if platelet count continues to improve

## 2023-10-31 NOTE — PLAN OF CARE
Problem: Adult Inpatient Plan of Care  Goal: Plan of Care Review  Outcome: Ongoing, Progressing  Goal: Absence of Hospital-Acquired Illness or Injury  Outcome: Ongoing, Progressing  Goal: Readiness for Transition of Care  Outcome: Ongoing, Progressing     Problem: Adjustment to Illness (Sepsis/Septic Shock)  Goal: Optimal Coping  Outcome: Ongoing, Progressing     Problem: Infection Progression (Sepsis/Septic Shock)  Goal: Absence of Infection Signs and Symptoms  Outcome: Ongoing, Progressing     Problem: Skin Injury Risk Increased  Goal: Skin Health and Integrity  Outcome: Ongoing, Progressing     Problem: Fall Injury Risk  Goal: Absence of Fall and Fall-Related Injury  Outcome: Ongoing, Progressing     Problem: Infection  Goal: Absence of Infection Signs and Symptoms  Outcome: Ongoing, Progressing

## 2023-10-31 NOTE — SUBJECTIVE & OBJECTIVE
"Interval History: patient alert watching horror movies. Greeted me with"happy  Halloween". He requested coffee with cream and 2 sugars.    Review of Systems  Objective:     Vital Signs (Most Recent):  Temp: 98 °F (36.7 °C) (10/31/23 1155)  Pulse: 76 (10/31/23 1300)  Resp: 19 (10/31/23 1155)  BP: (!) 114/53 (10/31/23 1155)  SpO2: (!) 93 % (10/31/23 1155) Vital Signs (24h Range):  Temp:  [97.4 °F (36.3 °C)-98.4 °F (36.9 °C)] 98 °F (36.7 °C)  Pulse:  [57-84] 76  Resp:  [16-19] 19  SpO2:  [86 %-95 %] 93 %  BP: ()/(47-61) 114/53     Weight: 74.3 kg (163 lb 12.8 oz)  Body mass index is 21.61 kg/m².    Intake/Output Summary (Last 24 hours) at 10/31/2023 1520  Last data filed at 10/31/2023 1049  Gross per 24 hour   Intake 240 ml   Output --   Net 240 ml         Physical Exam  HENT:      Head: Normocephalic and atraumatic.   Cardiovascular:      Rate and Rhythm: Normal rate and regular rhythm.      Heart sounds: No murmur heard.  Pulmonary:      Effort: Pulmonary effort is normal. No respiratory distress.      Breath sounds: Normal breath sounds. No wheezing.   Abdominal:      General: Bowel sounds are normal. There is no distension.      Palpations: Abdomen is soft.      Tenderness: There is no abdominal tenderness.   Musculoskeletal:         General: No swelling.   Skin:     General: Skin is warm and dry.   Neurological:      Mental Status: He is alert. Mental status is at baseline.             Significant Labs: All pertinent labs within the past 24 hours have been reviewed.  CBC:   Recent Labs   Lab 10/30/23  0626 10/31/23  0625   WBC 2.69* 2.22*   HGB 9.1* 10.6*   HCT 27.0* 32.4*   * 146*     CMP:   Recent Labs   Lab 10/30/23  0626 10/31/23  0625   * 140   K 4.0 3.7   CL 99 100   CO2 23 27   GLU 52* 73   BUN 16 14   CREATININE 0.6 0.6   CALCIUM 8.2* 8.5*   PROT 5.4* 6.3   ALBUMIN 2.3* 2.6*   BILITOT 0.3 0.3   ALKPHOS 92 102   AST 20 20   ALT 22 21   ANIONGAP 13 13       Significant Imaging: I have " reviewed all pertinent imaging results/findings within the past 24 hours.

## 2023-10-31 NOTE — NURSING
"Pt noted trying to exit bed, confused, wanting to go to "other bed." Personal sitter sleeping at bedside during this time. Bed alarm sounding and functioning properly. Staff assisted pt back to bed, reoriented and educated r/t fall risk and safety. Telesitter placed at that time.  "

## 2023-10-31 NOTE — PROGRESS NOTES
[facility]  Hospital Medicine  Progress Note    Patient Name: Juan Francisco Parker  MRN: [id number]  Patient Class: IP- Inpatient   Admission Date: 10/24/2023  Length of Stay: 7 days  Attending Physician: Ghanshyam Penn MD  Primary Care Provider: [facility]        Subjective:     Principal Problem:Sepsis        HPI:  Juan Francisco Parker is a 58 y.o. male with a PMH  has a past medical history of Diabetes mellitus, type 2 (2004), Hyperlipidemia, Hypertension, Mental disability, Paranoid personality disorder, Peripheral neuropathy, and Unspecified intellectual disabilities. who presented to the ED to the ED via EMS for further evaluation of worsening altered mental status since this morning.  History mainly obtained through chart review, ED sign-out, and caregiver at bedside as patient has underlying intellectual disability and only able to provide minimal information.  Patient has history of recurrent UTIs and was complaining of belly pain over the past few days.  Caregiver unsure of patient's last bowel movement but has continued to eat pureed food and drink fluids.  Patient usually ambulates with aid of wheelchair but is able to stand and walk around but has been mostly bed-bound the past few days.  No known alleviating or aggravating factors with noted with all other review of systems negative except as stated above.  Initial workup in the ED revealed patient met SIRS criteria for sepsis with UA positive for UTI.  Patient initiated on sepsis protocol and continued on Rocephin with cultures obtained and pending.  Patient being admitted to Hospital Medicine inpatient for continued medical management.       PCP: [facility]        Overview/Hospital Course:  Mr Parker was initially admitted with severe sepsis requiring fluid resuscitation and a leela hugger.  With improvement in BP the fluids were stopped and with improvements in his temp the leela hugger has been weaned, requiring intermittent restarts.   "On day 2 of hospitalization patient because lethargic  He appeared to be trying to cough up mucus but due to extreme weakness was not able to do so.  After aggressive suctioning he was able to have some mucus removed, but continued to have a weak cough. Pulmonology  was consulted.  The patient was started on 3% saline nebs to help with the congestion.  Chest xray Bi basilar low-grade infiltrates and/or atelectasis.  PT/OT/ST  - dysphagia diet and SNF.  Patient is also continued on empiric Rocephin for his UTI and possible pulmonary infection.  Urine culture Providencia rettgeri. Hematology consulted due to pancytopenia. Labs ordered as requested. His mental status improved.       Interval History: patient alert watching horror movies. Greeted me with"happy  Halloween". He requested coffee with cream and 2 sugars.    Review of Systems  Objective:     Vital Signs (Most Recent):  Temp: 98 °F (36.7 °C) (10/31/23 1155)  Pulse: 76 (10/31/23 1300)  Resp: 19 (10/31/23 1155)  BP: (!) 114/53 (10/31/23 1155)  SpO2: (!) 93 % (10/31/23 1155) Vital Signs (24h Range):  Temp:  [97.4 °F (36.3 °C)-98.4 °F (36.9 °C)] 98 °F (36.7 °C)  Pulse:  [57-84] 76  Resp:  [16-19] 19  SpO2:  [86 %-95 %] 93 %  BP: ()/(47-61) 114/53     Weight: 74.3 kg (163 lb 12.8 oz)  Body mass index is 21.61 kg/m².    Intake/Output Summary (Last 24 hours) at 10/31/2023 1520  Last data filed at 10/31/2023 1049  Gross per 24 hour   Intake 240 ml   Output --   Net 240 ml         Physical Exam  HENT:      Head: Normocephalic and atraumatic.   Cardiovascular:      Rate and Rhythm: Normal rate and regular rhythm.      Heart sounds: No murmur heard.  Pulmonary:      Effort: Pulmonary effort is normal. No respiratory distress.      Breath sounds: Normal breath sounds. No wheezing.   Abdominal:      General: Bowel sounds are normal. There is no distension.      Palpations: Abdomen is soft.      Tenderness: There is no abdominal tenderness.   Musculoskeletal:         " "General: No swelling.   Skin:     General: Skin is warm and dry.   Neurological:      Mental Status: He is alert. Mental status is at baseline.             Significant Labs: All pertinent labs within the past 24 hours have been reviewed.  CBC:   Recent Labs   Lab 10/30/23  0626 10/31/23  0625   WBC 2.69* 2.22*   HGB 9.1* 10.6*   HCT 27.0* 32.4*   * 146*     CMP:   Recent Labs   Lab 10/30/23  0626 10/31/23  0625   * 140   K 4.0 3.7   CL 99 100   CO2 23 27   GLU 52* 73   BUN 16 14   CREATININE 0.6 0.6   CALCIUM 8.2* 8.5*   PROT 5.4* 6.3   ALBUMIN 2.3* 2.6*   BILITOT 0.3 0.3   ALKPHOS 92 102   AST 20 20   ALT 22 21   ANIONGAP 13 13       Significant Imaging: I have reviewed all pertinent imaging results/findings within the past 24 hours.      Assessment/Plan:      * Sepsis  This patient does have evidence of infective focus  My overall impression is sepsis.  Source: Urinary Tract  Antibiotics given-   Antibiotics (72h ago, onward)    Start     Stop Route Frequency Ordered    10/29/23 1530  cefTRIAXone (ROCEPHIN) 1 g in dextrose 5 % in water (D5W) 100 mL IVPB (MB+)  ( Urinary Tract Infection (UTI))         11/01/23 1529 IV Every 24 hours (non-standard times) 10/29/23 0818        Latest lactate reviewed-  No results for input(s): "LACTATE" in the last 72 hours.  Organ dysfunction indicated by Encephalopathy    Fluid challenge Ideal Body Weight- The patient's ideal body weight is Ideal body weight: 79.9 kg (176 lb 2.4 oz) which will be used to calculate fluid bolus of 30 ml/kg for treatment of septic shock.      Post- resuscitation assessment No - Post resuscitation assessment not needed     Will Not start Pressors- Levophed for MAP of 65  Source control achieved by: Rocephin           Hypoglycemia  - currently on D5 NS  -monitor      Pancytopenia  This patient is found to have pancytopenia, the likely etiology is mulitfactorial, will monitor CBC Daily. Will transfuse red blood cells if the hemoglobin is " <7g/dL (or <8 in the setting of ACS). Will transfuse platelets if platelet count is <50k (if undergoing surgical procedure or have active bleeding). Hold DVT prophylaxis if platelets are <50k. The patient's hemoglobin, white blood cell count, and platelet count results have been reviewed and are listed below.  Recent Labs   Lab 10/31/23  0625   HGB 10.6*   WBC 2.22*   *       - Hematology evaluated, labs ordered as recommended    Weak cough  - Appreciate pulmonary assistance  - 3% saline nebs  -dysphagia diet    History of DVT (deep vein thrombosis)  -holding Eliquis due to thrombocytopenia  -anticipate resumption of Eliquis tomorrow if platelet count continues to improve      Neuropathy  -continue gabapentin      Anxiety and depression  -Continue home medications       Benign prostatic hyperplasia  Currently on Flomax outpatient.  Plan:  -continue home medication      Hyperlipidemia  -continue statin      Controlled type 2 diabetes mellitus, without long-term current use of insulin  Patient's FSGs are uncontrolled due to hypoglycemia on current medication regimen.  Last A1c reviewed-   Lab Results   Component Value Date    HGBA1C 5.1 12/10/2021     Most recent fingerstick glucose reviewed-   Recent Labs   Lab 10/30/23  1710 10/31/23  0605 10/31/23  1133   POCTGLUCOSE 84 63* 137*     Current correctional scale  none  Maintain anti-hyperglycemic dose as follows-   Antihyperglycemics (From admission, onward)    None        Hold Oral hypoglycemics while patient is in the hospital.  -currently on D5        Primary hypertension  -not currently on medication as lasix discontinued yesterday evening due to soft blood pressures  -monitor blood pressure      Intellectual disability  - from group home        VTE Risk Mitigation (From admission, onward)         Ordered     Place sequential compression device  Until discontinued         10/24/23 1907     IP VTE LOW RISK PATIENT  Once         10/24/23 1907                 Discharge Planning   GEOFF:      Code Status: Full Code   Is the patient medically ready for discharge?:     Reason for patient still in hospital (select all that apply): Patient trending condition, Laboratory test and Treatment  Discharge Plan A: Group Home                  Ghanshyam Penn MD  Department of Hospital Medicine   [facility]

## 2023-10-31 NOTE — PT/OT/SLP PROGRESS
Physical Therapy Treatment    Patient Name:  Juan Francisco Parker   MRN:  [id number]    Recommendations:     Discharge Recommendations: Moderate Intensity Therapy  Discharge Equipment Recommendations: to be determined by next level of care, walker, rolling, shower chair  Barriers to discharge: None    Assessment:     Juan Francisco Parker is a 58 y.o. male admitted with a medical diagnosis of Sepsis.  He presents with the following impairments/functional limitations: weakness, impaired endurance, impaired functional mobility, gait instability, impaired balance, pain, decreased safety awareness, decreased lower extremity function, decreased ROM, impaired cardiopulmonary response to activity, decreased coordination.    Rehab Prognosis: Good; patient would benefit from acute skilled PT services to address these deficits and reach maximum level of function.    Recent Surgery: * No surgery found *      Plan:     During this hospitalization, patient to be seen 3 x/week to address the identified rehab impairments via gait training, therapeutic activities, therapeutic exercises and progress toward the following goals:    Plan of Care Expires:  11/10/23    Subjective     Chief Complaint: Pt is motivated to participate, eager to sit in chair  Patient/Family Comments/goals: none stated  Pain/Comfort:  Pain Rating 1: 0/10      Objective:     Communicated with nurse [facility] and [facility] chart review prior to session.  Patient found HOB elevated with peripheral IV, telemetry, bed alarm (HARMONY SYS) upon PT entry to room.     General Precautions: Standard, fall  Orthopedic Precautions: N/A  Braces: N/A  Respiratory Status: Room air     Functional Mobility:  Gait belt applied - Yes  Bed Mobility  Rolling Right: stand by assistance  Scooting: stand by assistance  Supine to Sit: stand by assistance  Transfers  Sit to Stand x4 trials: contact guard assistance with rolling walker  Bed to Chair: moderate assistance with rolling walker using Step  "Transfer  Gait  Patient ambulated 120ft with rolling walker and moderate assistance. Patient demonstrates unsteady gait, bent knees, fast pace at times. Noc/o dizziness or SOB. Frequent verbal cuing for safety and RW management. All lines remained intact throughout ambulation trail.  Balance  Sitting: stand by assistance  Standing: moderate assistance  Pt soiled, assistance given to don/doff brief and gown, linens changed    AM-PAC 6 CLICK MOBILITY  Turning over in bed (including adjusting bedclothes, sheets and blankets)?: 3  Sitting down on and standing up from a chair with arms (e.g., wheelchair, bedside commode, etc.): 3  Moving from lying on back to sitting on the side of the bed?: 3  Moving to and from a bed to a chair (including a wheelchair)?: 2  Need to walk in hospital room?: 2  Climbing 3-5 steps with a railing?: 1 (NT)  Basic Mobility Total Score: 14       Treatment & Education:  Reviewed role of PT in acute care and POC. Pt tolerated interventions well. Reviewed importance of OOB activities, activity pacing, and HEP (marching/hip flex, hip abd, heel slides/LAQ, quad sets, ankle pumps) in order to maintain/regain strength. Encouraged to sit up in chair for all meals. Reviewed proper use of RW for safety and to reduce risk of falling. Reviewed "call don't fall" policy and increased risk of falling due to weakness, instructed to utilize call bell for assistance with all transfers. Pt agreeable to all requests. Pt able to repeat back instructions.    Patient left up in chair with all lines intact, call button in reach, chair alarm on, and nurse notified..    GOALS:   Multidisciplinary Problems       Physical Therapy Goals          Problem: Physical Therapy    Goal Priority Disciplines Outcome Goal Variances Interventions   Physical Therapy Goal     PT, PT/OT Ongoing, Progressing     Description: Goals to be met by 11/10/23.  1. Pt will complete bed mobility MOD I.  2. Pt will complete sit to stand SBA.  3. " Pt will transfer bed to chair MIN A.  4. Pt will increase AMPAC score by 2 points to progress functional mobility.                       Time Tracking:     PT Received On: 10/31/23  PT Start Time: 1345     PT Stop Time: 1430  PT Total Time (min): 45 min     Billable Minutes: Gait Training 15min and Therapeutic Activity 30min    Treatment Type: Treatment  PT/PTA: PT     Number of PTA visits since last PT visit: 0     10/31/2023

## 2023-10-31 NOTE — ASSESSMENT & PLAN NOTE
Patient's FSGs are uncontrolled due to hypoglycemia on current medication regimen.  Last A1c reviewed-   Lab Results   Component Value Date    HGBA1C 5.1 12/10/2021     Most recent fingerstick glucose reviewed-   Recent Labs   Lab 10/30/23  1710 10/31/23  0605 10/31/23  1133   POCTGLUCOSE 84 63* 137*     Current correctional scale  none  Maintain anti-hyperglycemic dose as follows-   Antihyperglycemics (From admission, onward)    None        Hold Oral hypoglycemics while patient is in the hospital.  -currently on D5

## 2023-10-31 NOTE — ASSESSMENT & PLAN NOTE
This patient is found to have pancytopenia, the likely etiology is mulitfactorial, will monitor CBC Daily. Will transfuse red blood cells if the hemoglobin is <7g/dL (or <8 in the setting of ACS). Will transfuse platelets if platelet count is <50k (if undergoing surgical procedure or have active bleeding). Hold DVT prophylaxis if platelets are <50k. The patient's hemoglobin, white blood cell count, and platelet count results have been reviewed and are listed below.  Recent Labs   Lab 10/31/23  0625   HGB 10.6*   WBC 2.22*   *       - Hematology evaluated, labs ordered as recommended

## 2023-10-31 NOTE — NURSING
Pt's  blood glucose 62 mg/dl after he ate dinner. Pt was given juice, ice cream, and a piece of chocolate candy. Blood glucose 75 mg/dl. Notified Dr. Penn. MD ordered increase in IVF (D5W 0.9 NS) and instructed to give glucose tablets. Plan of care ongoing.

## 2023-10-31 NOTE — PT/OT/SLP PROGRESS
Occupational Therapy   Treatment    Name: Juan Francisco Parker  MRN: [id number]  Admitting Diagnosis:  Sepsis       Recommendations:     Discharge Recommendations: Moderate Intensity Therapy  Discharge Equipment Recommendations:  shower chair, walker, rolling  Barriers to discharge:  None    Assessment:     Juan Francisco Parker is a 58 y.o. male with a medical diagnosis of Sepsis.  He presents with the following performance deficits affecting function are weakness, impaired endurance, impaired self care skills, impaired functional mobility, gait instability, impaired balance, impaired cognition, decreased coordination, decreased upper extremity function, decreased lower extremity function, decreased safety awareness, impaired cardiopulmonary response to activity.     Rehab Prognosis:  Good; patient would benefit from acute skilled OT services to address these deficits and reach maximum level of function.       Plan:     Patient to be seen 2 x/week to address the above listed problems via self-care/home management, therapeutic activities, therapeutic exercises  Plan of Care Expires: 11/10/23  Plan of Care Reviewed with: patient    Subjective     Chief Complaint: none reported  Patient/Family Comments/goals: sit up OOB  Pain/Comfort:  Pain Rating 1: 0/10    Objective:     Communicated with: Nurse and epic chart review prior to session.  Patient found HOB elevated with peripheral IV, telemetry, bed alarm, Other (comments) (AVASYS) upon OT entry to room.    General Precautions: Standard, fall    Orthopedic Precautions:N/A  Braces: N/A  Respiratory Status: Room air     Occupational Performance:     Bed Mobility:    Patient completed Rolling/Turning to Right with stand by assistance  Patient completed Scooting/Bridging with stand by assistance  Patient completed Supine to Sit with stand by assistance     Functional Mobility/Transfers:  Patient completed Sit <> Stand Transfer with contact guard assistance  with  rolling walker x4 trials;  x3 EOB, x1 chair  Patient completed Bed <> Chair Transfer using Stand Pivot technique with moderate assistance with rolling walker  Functional Mobility: Patient completed x120ft functional mobility with Mod A and RW to increase dynamic standing balance and activity tolerance needed for ADL completion. Pt unsteady during ambulation. Required extended time to complete.    Activities of Daily Living:  Grooming: setup A. Apply petroleum jelly on lips.  Upper Body Dressing: moderate assistance doff/jeevan gown  Lower Body Dressing: total assistance doff/jeevan brief  Toileting: total assistance pt found soiled, required assist with cleaning and clothing management.    [facility] 6 Click ADL: 12    Treatment & Education:  Reviewed role of OT in acute setting and benefits of participation. Educated on techniques to use to increase independence and decrease fall risk with functional transfers. Educated on importance of OOB activity and calling for A to transfer back to bed. Encouraged completion of B UE AROM therex throughout the day to tolerance to increase functional strength and activity tolerance. Educated patient on importance of increased tolerance to upright position and direct impact on CV endurance and strength. Patient encouraged to sit up in chair for a minimum of 2 consecutive hours per day. Patient stated understanding and in agreement with POC.     Patient left up in chair with all lines intact, call button in reach, chair alarm on, and nurse notified    GOALS:   Multidisciplinary Problems       Occupational Therapy Goals          Problem: Occupational Therapy    Goal Priority Disciplines Outcome Interventions   Occupational Therapy Goal     OT, PT/OT Ongoing, Progressing    Description: Goals to be met by: 11/10/23     Patient will increase functional independence with ADLs by performing:    UE Dressing with Mod Assistance.  Supine to sit with Mod Assistance.  Increased functional strength to BUE grossly by 1/2 MMT  grades.                         Time Tracking:     OT Date of Treatment: 10/31/23  OT Start Time: 1345  OT Stop Time: 1430  OT Total Time (min): 45 min    Billable Minutes:Self Care/Home Management 15  Therapeutic Activity 30    OT/JELENA: HORTENCIA Judge OT     10/31/2023

## 2023-10-31 NOTE — ASSESSMENT & PLAN NOTE
-not currently on medication as lasix discontinued yesterday evening due to soft blood pressures  -monitor blood pressure

## 2023-11-01 LAB
ALBUMIN SERPL BCP-MCNC: 2.2 G/DL (ref 3.5–5.2)
ALP SERPL-CCNC: 86 U/L (ref 55–135)
ALT SERPL W/O P-5'-P-CCNC: 20 U/L (ref 10–44)
ANION GAP SERPL CALC-SCNC: 10 MMOL/L (ref 8–16)
AST SERPL-CCNC: 20 U/L (ref 10–40)
BASOPHILS # BLD AUTO: 0.02 K/UL (ref 0–0.2)
BASOPHILS NFR BLD: 0.7 % (ref 0–1.9)
BILIRUB SERPL-MCNC: 0.2 MG/DL (ref 0.1–1)
BUN SERPL-MCNC: 13 MG/DL (ref 6–20)
C PEPTIDE SERPL-MCNC: 2.19 NG/ML (ref 0.78–5.19)
CALCIUM SERPL-MCNC: 7.8 MG/DL (ref 8.7–10.5)
CHLORIDE SERPL-SCNC: 104 MMOL/L (ref 95–110)
CO2 SERPL-SCNC: 22 MMOL/L (ref 23–29)
CREAT SERPL-MCNC: 0.6 MG/DL (ref 0.5–1.4)
DIFFERENTIAL METHOD: ABNORMAL
EOSINOPHIL # BLD AUTO: 0.1 K/UL (ref 0–0.5)
EOSINOPHIL NFR BLD: 2.5 % (ref 0–8)
ERYTHROCYTE [DISTWIDTH] IN BLOOD BY AUTOMATED COUNT: 15.7 % (ref 11.5–14.5)
EST. GFR  (NO RACE VARIABLE): >60 ML/MIN/1.73 M^2
GLUCOSE SERPL-MCNC: 91 MG/DL (ref 70–110)
HCT VFR BLD AUTO: 28.8 % (ref 40–54)
HGB BLD-MCNC: 9.3 G/DL (ref 14–18)
IMM GRANULOCYTES # BLD AUTO: 0.03 K/UL (ref 0–0.04)
IMM GRANULOCYTES NFR BLD AUTO: 1.1 % (ref 0–0.5)
LYMPHOCYTES # BLD AUTO: 0.7 K/UL (ref 1–4.8)
LYMPHOCYTES NFR BLD: 24.6 % (ref 18–48)
MAGNESIUM SERPL-MCNC: 1.9 MG/DL (ref 1.6–2.6)
MCH RBC QN AUTO: 32.6 PG (ref 27–31)
MCHC RBC AUTO-ENTMCNC: 32.3 G/DL (ref 32–36)
MCV RBC AUTO: 101 FL (ref 82–98)
MONOCYTES # BLD AUTO: 0.5 K/UL (ref 0.3–1)
MONOCYTES NFR BLD: 19.2 % (ref 4–15)
NEUTROPHILS # BLD AUTO: 1.4 K/UL (ref 1.8–7.7)
NEUTROPHILS NFR BLD: 51.9 % (ref 38–73)
NRBC BLD-RTO: 0 /100 WBC
PHOSPHATE SERPL-MCNC: 3.1 MG/DL (ref 2.7–4.5)
PLATELET # BLD AUTO: 152 K/UL (ref 150–450)
PMV BLD AUTO: 9.9 FL (ref 9.2–12.9)
POCT GLUCOSE: 102 MG/DL (ref 70–110)
POCT GLUCOSE: 125 MG/DL (ref 70–110)
POCT GLUCOSE: 165 MG/DL (ref 70–110)
POCT GLUCOSE: 43 MG/DL (ref 70–110)
POCT GLUCOSE: 46 MG/DL (ref 70–110)
POCT GLUCOSE: 53 MG/DL (ref 70–110)
POCT GLUCOSE: 79 MG/DL (ref 70–110)
POCT GLUCOSE: 81 MG/DL (ref 70–110)
POTASSIUM SERPL-SCNC: 4.2 MMOL/L (ref 3.5–5.1)
PROT SERPL-MCNC: 5.2 G/DL (ref 6–8.4)
RBC # BLD AUTO: 2.85 M/UL (ref 4.6–6.2)
SODIUM SERPL-SCNC: 136 MMOL/L (ref 136–145)
WBC # BLD AUTO: 2.76 K/UL (ref 3.9–12.7)

## 2023-11-01 PROCEDURE — 80053 COMPREHEN METABOLIC PANEL: CPT | Performed by: HOSPITALIST

## 2023-11-01 PROCEDURE — 25000003 PHARM REV CODE 250: Performed by: HOSPITALIST

## 2023-11-01 PROCEDURE — A4216 STERILE WATER/SALINE, 10 ML: HCPCS | Performed by: FAMILY MEDICINE

## 2023-11-01 PROCEDURE — 25000003 PHARM REV CODE 250: Performed by: FAMILY MEDICINE

## 2023-11-01 PROCEDURE — 83735 ASSAY OF MAGNESIUM: CPT | Performed by: HOSPITALIST

## 2023-11-01 PROCEDURE — 36415 COLL VENOUS BLD VENIPUNCTURE: CPT | Performed by: HOSPITALIST

## 2023-11-01 PROCEDURE — 36415 COLL VENOUS BLD VENIPUNCTURE: CPT | Performed by: INTERNAL MEDICINE

## 2023-11-01 PROCEDURE — 97530 THERAPEUTIC ACTIVITIES: CPT

## 2023-11-01 PROCEDURE — 84100 ASSAY OF PHOSPHORUS: CPT | Performed by: HOSPITALIST

## 2023-11-01 PROCEDURE — 84681 ASSAY OF C-PEPTIDE: CPT | Performed by: INTERNAL MEDICINE

## 2023-11-01 PROCEDURE — 63600175 PHARM REV CODE 636 W HCPCS: Performed by: INTERNAL MEDICINE

## 2023-11-01 PROCEDURE — 97116 GAIT TRAINING THERAPY: CPT

## 2023-11-01 PROCEDURE — 11000001 HC ACUTE MED/SURG PRIVATE ROOM

## 2023-11-01 PROCEDURE — 85025 COMPLETE CBC W/AUTO DIFF WBC: CPT | Performed by: HOSPITALIST

## 2023-11-01 RX ADMIN — POTASSIUM CHLORIDE 20 MEQ: 1500 TABLET, EXTENDED RELEASE ORAL at 08:11

## 2023-11-01 RX ADMIN — RISPERIDONE 0.5 MG: 0.5 TABLET ORAL at 10:11

## 2023-11-01 RX ADMIN — DEXTROSE 250 ML: 10 SOLUTION INTRAVENOUS at 01:11

## 2023-11-01 RX ADMIN — DIVALPROEX SODIUM 1000 MG: 500 TABLET, FILM COATED, EXTENDED RELEASE ORAL at 10:11

## 2023-11-01 RX ADMIN — POTASSIUM CHLORIDE 20 MEQ: 1500 TABLET, EXTENDED RELEASE ORAL at 10:11

## 2023-11-01 RX ADMIN — OXCARBAZEPINE 600 MG: 150 TABLET, FILM COATED ORAL at 10:11

## 2023-11-01 RX ADMIN — OXCARBAZEPINE 600 MG: 150 TABLET, FILM COATED ORAL at 08:11

## 2023-11-01 RX ADMIN — TAMSULOSIN HYDROCHLORIDE 0.4 MG: 0.4 CAPSULE ORAL at 08:11

## 2023-11-01 RX ADMIN — QUETIAPINE FUMARATE 400 MG: 100 TABLET ORAL at 10:11

## 2023-11-01 RX ADMIN — SODIUM CHLORIDE, PRESERVATIVE FREE 10 ML: 5 INJECTION INTRAVENOUS at 11:11

## 2023-11-01 RX ADMIN — GABAPENTIN 300 MG: 300 CAPSULE ORAL at 08:11

## 2023-11-01 RX ADMIN — ATORVASTATIN CALCIUM 10 MG: 10 TABLET, FILM COATED ORAL at 08:11

## 2023-11-01 RX ADMIN — RISPERIDONE 0.5 MG: 0.5 TABLET ORAL at 08:11

## 2023-11-01 RX ADMIN — DEXTROSE AND SODIUM CHLORIDE: 5; 900 INJECTION, SOLUTION INTRAVENOUS at 03:11

## 2023-11-01 RX ADMIN — GABAPENTIN 300 MG: 300 CAPSULE ORAL at 10:11

## 2023-11-01 RX ADMIN — Medication 16 G: at 12:11

## 2023-11-01 RX ADMIN — DEXTROSE AND SODIUM CHLORIDE: 5; 900 INJECTION, SOLUTION INTRAVENOUS at 02:11

## 2023-11-01 RX ADMIN — GABAPENTIN 300 MG: 300 CAPSULE ORAL at 03:11

## 2023-11-01 RX ADMIN — DIVALPROEX SODIUM 250 MG: 500 TABLET, FILM COATED, EXTENDED RELEASE ORAL at 08:11

## 2023-11-01 RX ADMIN — FLUOXETINE 40 MG: 20 CAPSULE ORAL at 08:11

## 2023-11-01 NOTE — PLAN OF CARE
Pt bed mobility CGA. Sit to stand with RW mod A with posterior lean.   Ambulated with RW x300 feet mod A with verbal cues for safety and sequencing with RW.   Rec mod intensity therapy at ME

## 2023-11-01 NOTE — ASSESSMENT & PLAN NOTE
Patient's FSGs are uncontrolled due to hypoglycemia on current medication regimen.  Last A1c reviewed-   Lab Results   Component Value Date    HGBA1C 5.1 12/10/2021     Most recent fingerstick glucose reviewed-   Recent Labs   Lab 11/01/23  1302 11/01/23  1303 11/01/23  1350 11/01/23  1558   POCTGLUCOSE 46* 43* 165* 125*     Current correctional scale  none  Maintain anti-hyperglycemic dose as follows-   Antihyperglycemics (From admission, onward)    None        Hold Oral hypoglycemics while patient is in the hospital.  -currently on D5

## 2023-11-01 NOTE — ASSESSMENT & PLAN NOTE
This patient is found to have pancytopenia, the likely etiology is mulitfactorial, will monitor CBC Daily. Will transfuse red blood cells if the hemoglobin is <7g/dL (or <8 in the setting of ACS). Will transfuse platelets if platelet count is <50k (if undergoing surgical procedure or have active bleeding). Hold DVT prophylaxis if platelets are <50k. The patient's hemoglobin, white blood cell count, and platelet count results have been reviewed and are listed below.  Recent Labs   Lab 11/01/23  0424   HGB 9.3*   WBC 2.76*          - Hematology evaluated, labs ordered as recommended  -no significant deficiencies  -platelet count improving

## 2023-11-01 NOTE — ASSESSMENT & PLAN NOTE
"This patient does have evidence of infective focus  My overall impression is sepsis.  Source: Urinary Tract  Antibiotics given-   Antibiotics (72h ago, onward)    None        Latest lactate reviewed-  No results for input(s): "LACTATE" in the last 72 hours.  Organ dysfunction indicated by Encephalopathy    Fluid challenge Ideal Body Weight- The patient's ideal body weight is Ideal body weight: 79.9 kg (176 lb 2.4 oz) which will be used to calculate fluid bolus of 30 ml/kg for treatment of septic shock.      Post- resuscitation assessment No - Post resuscitation assessment not needed     Will Not start Pressors- Levophed for MAP of 65  Source control achieved by: Rocephin         -resolved  -completed 7 day course of abx  "

## 2023-11-01 NOTE — PLAN OF CARE
CM spoke with supervisor (Fiorella) and patient's nurse (Josr) from [facility]. Patient has no family per [facility] staff. CM discussed accepting SNF facilities, staff in agreement to choose [facility] as first choice.    CM to notify [facility] admissions staff.   Topical Steroids Applications Pregnancy And Lactation Text: Most topical steroids are considered safe to use during pregnancy and lactation.  Any topical steroid applied to the breast or nipple should be washed off before breastfeeding.

## 2023-11-01 NOTE — PLAN OF CARE
[id number] pending at this time. Awaiting return call from [facility] and medical clearance.

## 2023-11-01 NOTE — ASSESSMENT & PLAN NOTE
-not currently on medication as lasix discontinued  due to soft blood pressures  -monitor blood pressure

## 2023-11-01 NOTE — PROGRESS NOTES
[facility]  Wound Care    Patient Name:  Juan Francisco Parker   MRN:  [id number]  Date: 11/1/2023  Diagnosis: Sepsis    History:     Past Medical History:   Diagnosis Date    Diabetes mellitus, type 2 2004    BS didn't check 09/06/2022    Hyperlipidemia     Hypertension     Mental disability     Paranoid personality disorder     Peripheral neuropathy     Unspecified intellectual disabilities        Social History     Socioeconomic History    Marital status: Single   Tobacco Use    Smoking status: Former    Smokeless tobacco: Never       Precautions:     Allergies as of 10/24/2023 - Reviewed 10/24/2023   Allergen Reaction Noted    Cephalexin  12/10/2013       WO Assessment Details/Treatment     F/U visit with mr. Parker for ongoing wound assessment. He is awake and alert.  Scattered areas of resovling ecchymosis again noted. Pressure injury to right hip again noted, stage 3, cleansed with saline and aquacel and foam dressing applied. Left heel scab again noted, right heel intact. Buttocks ulcer healing well. Recommend ongoing care per orders.      11/01/23 0945   WOCN Assessment   WOCN Total Time (mins) 30   Visit Date 11/01/23   Visit Time 0945   Consult Type Follow Up   WOCN Speciality Wound        Altered Skin Integrity 10/24/23 2200 Right anterior Hip #1 Other (comment)   Date First Assessed/Time First Assessed: 10/24/23 2200   Altered Skin Integrity Present on Admission - Did Patient arrive to the hospital with altered skin?: yes  Side: Right  Orientation: anterior  Location: Hip  Wound Number: #1  Is this injury tomer...   Description of Altered Skin Integrity Full thickness tissue loss. Subcutaneous fat may be visible but bone, tendon or muscle are not exposed   Dressing Appearance Intact;Moist drainage   Drainage Amount Scant   Drainage Characteristics/Odor Serous   Appearance Pink;Red;Yellow;Moist;Adipose   Tissue loss description Full thickness   Wound Edges Open   Wound Length (cm) 1.75 cm   Wound Width  (cm) 1.75 cm   Wound Depth (cm) 0.2 cm   Wound Volume (cm^3) 0.6125 cm^3   Wound Surface Area (cm^2) 3.0625 cm^2   Care Cleansed with:;Sterile normal saline;Applied:;Skin Barrier   Dressing Hydrofiber;Foam;Changed        Altered Skin Integrity 10/24/23 2300 Left Buttocks #2 Skin Tear Partial thickness tissue loss. Shallow open ulcer with a red or pink wound bed, without slough. Intact or Open/Ruptured Serum-filled blister.   Date First Assessed/Time First Assessed: 10/24/23 2300   Altered Skin Integrity Present on Admission - Did Patient arrive to the hospital with altered skin?: yes  Side: Left  Location: Buttocks  Wound Number: #2  Is this injury device related?: No  Pr...   Wound Image    Dressing Appearance Intact   Drainage Amount Scant   Drainage Characteristics/Odor Serous   Appearance Pink;Red;Moist   Care Cleansed with:;Sterile normal saline;Applied:;Skin Barrier   Dressing Foam        Altered Skin Integrity 10/24/23 2300 Left Heel #3 Other (comment)   Date First Assessed/Time First Assessed: 10/24/23 2300   Altered Skin Integrity Present on Admission - Did Patient arrive to the hospital with altered skin?: yes  Side: Left  Location: Heel  Wound Number: #3  Is this injury device related?: No  Primar...   Dressing Foam       Recommendations made to primary team for wound care per orders; pressure injury prevention interventions.     11/01/2023

## 2023-11-01 NOTE — PLAN OF CARE
CM attempted to contact [facility] regarding recommendation for SNF as patient unable to make his own decisions. Spoke with Lashaun who recommended speaking with the Group Home Nurse Supervisor (Fiorella Menard [phone]). CM unable to leave a voicemail, will attempt to call again.    Patient has several accepting SNF facilities at this time.

## 2023-11-01 NOTE — PLAN OF CARE
Problem: Adult Inpatient Plan of Care  Goal: Plan of Care Review  Outcome: Ongoing, Progressing  Goal: Absence of Hospital-Acquired Illness or Injury  Outcome: Ongoing, Progressing  Goal: Optimal Comfort and Wellbeing  Outcome: Ongoing, Progressing     Problem: Diabetes Comorbidity  Goal: Blood Glucose Level Within Targeted Range  Outcome: Ongoing, Progressing     Problem: Adjustment to Illness (Sepsis/Septic Shock)  Goal: Optimal Coping  Outcome: Ongoing, Progressing     Problem: Glycemic Control Impaired (Sepsis/Septic Shock)  Goal: Blood Glucose Level Within Desired Range  Outcome: Ongoing, Progressing     Problem: Infection Progression (Sepsis/Septic Shock)  Goal: Absence of Infection Signs and Symptoms  Outcome: Ongoing, Progressing     Problem: Skin Injury Risk Increased  Goal: Skin Health and Integrity  Outcome: Ongoing, Progressing

## 2023-11-01 NOTE — PLAN OF CARE
Problem: Adult Inpatient Plan of Care  Goal: Plan of Care Review  Outcome: Ongoing, Progressing  Flowsheets (Taken 11/1/2023 1505)  Plan of Care Reviewed With: patient     Problem: Diabetes Comorbidity  Goal: Blood Glucose Level Within Targeted Range  Outcome: Ongoing, Progressing  Intervention: Monitor and Manage Glycemia  Flowsheets (Taken 11/1/2023 1505)  Glycemic Management:   blood glucose monitored   oral glucose given   oral hydration promoted

## 2023-11-01 NOTE — SUBJECTIVE & OBJECTIVE
Interval History: patient with intermittent hypoglycemia despite D5 and eating all of his meals.     Review of Systems  Objective:     Vital Signs (Most Recent):  Temp: 98.2 °F (36.8 °C) (11/01/23 1552)  Pulse: 86 (11/01/23 1725)  Resp: 18 (11/01/23 1552)  BP: (!) 105/49 (11/01/23 1552)  SpO2: 96 % (11/01/23 1552) Vital Signs (24h Range):  Temp:  [97.8 °F (36.6 °C)-98.2 °F (36.8 °C)] 98.2 °F (36.8 °C)  Pulse:  [73-92] 86  Resp:  [16-18] 18  SpO2:  [93 %-99 %] 96 %  BP: (100-125)/(49-56) 105/49     Weight: 74.3 kg (163 lb 12.8 oz)  Body mass index is 21.61 kg/m².    Intake/Output Summary (Last 24 hours) at 11/1/2023 1754  Last data filed at 11/1/2023 0533  Gross per 24 hour   Intake 5782.42 ml   Output --   Net 5782.42 ml         Physical Exam  HENT:      Head: Normocephalic and atraumatic.   Cardiovascular:      Rate and Rhythm: Normal rate and regular rhythm.      Heart sounds: No murmur heard.  Pulmonary:      Effort: Pulmonary effort is normal. No respiratory distress.      Breath sounds: Normal breath sounds. No wheezing.   Abdominal:      General: Bowel sounds are normal. There is no distension.      Palpations: Abdomen is soft.      Tenderness: There is no abdominal tenderness.   Musculoskeletal:         General: No swelling.   Skin:     General: Skin is warm and dry.   Neurological:      Mental Status: He is alert and oriented to person, place, and time. Mental status is at baseline.             Significant Labs: All pertinent labs within the past 24 hours have been reviewed.  CBC:   Recent Labs   Lab 10/31/23  0625 11/01/23  0424   WBC 2.22* 2.76*   HGB 10.6* 9.3*   HCT 32.4* 28.8*   * 152     CMP:   Recent Labs   Lab 10/31/23  0625 11/01/23  0424    136   K 3.7 4.2    104   CO2 27 22*   GLU 73 91   BUN 14 13   CREATININE 0.6 0.6   CALCIUM 8.5* 7.8*   PROT 6.3 5.2*   ALBUMIN 2.6* 2.2*   BILITOT 0.3 0.2   ALKPHOS 102 86   AST 20 20   ALT 21 20   ANIONGAP 13 10     POCT Glucose:   Recent  Labs   Lab 11/01/23  1303 11/01/23  1350 11/01/23  1558   POCTGLUCOSE 43* 165* 125*       Significant Imaging:

## 2023-11-01 NOTE — ASSESSMENT & PLAN NOTE
- currently on D5 NS  -requiring treatment with hypoglycemia protocol  -obtain morning cortisol and ACTH, may have some degree of cortisol insuffiency  -monitor

## 2023-11-01 NOTE — PROGRESS NOTES
[facility]  Hospital Medicine  Progress Note    Patient Name: Juan Francisco Parker  MRN: [id number]  Patient Class: IP- Inpatient   Admission Date: 10/24/2023  Length of Stay: 8 days  Attending Physician: Ghanshyam Penn MD  Primary Care Provider: [facility] Primary [facility]        Subjective:     Principal Problem:Sepsis        HPI:  Juan Francisco Parker is a 58 y.o. male with a PMH  has a past medical history of Diabetes mellitus, type 2 (2004), Hyperlipidemia, Hypertension, Mental disability, Paranoid personality disorder, Peripheral neuropathy, and Unspecified intellectual disabilities. who presented to the ED to the ED via EMS for further evaluation of worsening altered mental status since this morning.  History mainly obtained through chart review, ED sign-out, and caregiver at bedside as patient has underlying intellectual disability and only able to provide minimal information.  Patient has history of recurrent UTIs and was complaining of belly pain over the past few days.  Caregiver unsure of patient's last bowel movement but has continued to eat pureed food and drink fluids.  Patient usually ambulates with aid of wheelchair but is able to stand and walk around but has been mostly bed-bound the past few days.  No known alleviating or aggravating factors with noted with all other review of systems negative except as stated above.  Initial workup in the ED revealed patient met SIRS criteria for sepsis with UA positive for UTI.  Patient initiated on sepsis protocol and continued on Rocephin with cultures obtained and pending.  Patient being admitted to Hospital Medicine inpatient for continued medical management.       PCP: [facility] Primary Doctor        Overview/Hospital Course:  Mr Parker was initially admitted with severe sepsis requiring fluid resuscitation and a leela hugger.  With improvement in BP the fluids were stopped and with improvements in his temp the leela hugger has been weaned, requiring intermittent restarts.   On day 2 of hospitalization patient because lethargic  He appeared to be trying to cough up mucus but due to extreme weakness was not able to do so.  After aggressive suctioning he was able to have some mucus removed, but continued to have a weak cough. Pulmonology  was consulted.  The patient was started on 3% saline nebs to help with the congestion.  Chest xray Bi basilar low-grade infiltrates and/or atelectasis.  PT/OT/ST  - dysphagia diet and SNF. Urine culture Providencia rettgeri. Patient completed 7 day course of ceftriaxone. Hematology consulted due to pancytopenia. Labs ordered as requested and no significant deficiencies noted. His mental status improved. He had intermittent hypoglycemia requiring D5 infusion and dextrose tablets.       Interval History: patient with intermittent hypoglycemia despite D5 and eating all of his meals.     Review of Systems  Objective:     Vital Signs (Most Recent):  Temp: 98.2 °F (36.8 °C) (11/01/23 1552)  Pulse: 86 (11/01/23 1725)  Resp: 18 (11/01/23 1552)  BP: (!) 105/49 (11/01/23 1552)  SpO2: 96 % (11/01/23 1552) Vital Signs (24h Range):  Temp:  [97.8 °F (36.6 °C)-98.2 °F (36.8 °C)] 98.2 °F (36.8 °C)  Pulse:  [73-92] 86  Resp:  [16-18] 18  SpO2:  [93 %-99 %] 96 %  BP: (100-125)/(49-56) 105/49     Weight: 74.3 kg (163 lb 12.8 oz)  Body mass index is 21.61 kg/m².    Intake/Output Summary (Last 24 hours) at 11/1/2023 175  Last data filed at 11/1/2023 0533  Gross per 24 hour   Intake 5782.42 ml   Output --   Net 5782.42 ml         Physical Exam  HENT:      Head: Normocephalic and atraumatic.   Cardiovascular:      Rate and Rhythm: Normal rate and regular rhythm.      Heart sounds: No murmur heard.  Pulmonary:      Effort: Pulmonary effort is normal. No respiratory distress.      Breath sounds: Normal breath sounds. No wheezing.   Abdominal:      General: Bowel sounds are normal. There is no distension.      Palpations: Abdomen is soft.      Tenderness: There is no abdominal  "tenderness.   Musculoskeletal:         General: No swelling.   Skin:     General: Skin is warm and dry.   Neurological:      Mental Status: He is alert and oriented to person, place, and time. Mental status is at baseline.             Significant Labs: All pertinent labs within the past 24 hours have been reviewed.  CBC:   Recent Labs   Lab 10/31/23  0625 11/01/23  0424   WBC 2.22* 2.76*   HGB 10.6* 9.3*   HCT 32.4* 28.8*   * 152     CMP:   Recent Labs   Lab 10/31/23  0625 11/01/23  0424    136   K 3.7 4.2    104   CO2 27 22*   GLU 73 91   BUN 14 13   CREATININE 0.6 0.6   CALCIUM 8.5* 7.8*   PROT 6.3 5.2*   ALBUMIN 2.6* 2.2*   BILITOT 0.3 0.2   ALKPHOS 102 86   AST 20 20   ALT 21 20   ANIONGAP 13 10     POCT Glucose:   Recent Labs   Lab 11/01/23  1303 11/01/23  1350 11/01/23  1558   POCTGLUCOSE 43* 165* 125*       Significant Imaging:       Assessment/Plan:      * Sepsis  This patient does have evidence of infective focus  My overall impression is sepsis.  Source: Urinary Tract  Antibiotics given-   Antibiotics (72h ago, onward)    None        Latest lactate reviewed-  No results for input(s): "LACTATE" in the last 72 hours.  Organ dysfunction indicated by Encephalopathy    Fluid challenge Ideal Body Weight- The patient's ideal body weight is Ideal body weight: 79.9 kg (176 lb 2.4 oz) which will be used to calculate fluid bolus of 30 ml/kg for treatment of septic shock.      Post- resuscitation assessment No - Post resuscitation assessment not needed     Will Not start Pressors- Levophed for MAP of 65  Source control achieved by: Rocephin         -resolved  -completed 7 day course of abx    Hypoglycemia  - currently on D5 NS  -requiring treatment with hypoglycemia protocol  -obtain morning cortisol and ACTH, may have some degree of cortisol insuffiency  -monitor      Pancytopenia  This patient is found to have pancytopenia, the likely etiology is mulitfactorial, will monitor CBC Daily. Will " transfuse red blood cells if the hemoglobin is <7g/dL (or <8 in the setting of ACS). Will transfuse platelets if platelet count is <50k (if undergoing surgical procedure or have active bleeding). Hold DVT prophylaxis if platelets are <50k. The patient's hemoglobin, white blood cell count, and platelet count results have been reviewed and are listed below.  Recent Labs   Lab 11/01/23  0424   HGB 9.3*   WBC 2.76*          - Hematology evaluated, labs ordered as recommended  -no significant deficiencies  -platelet count improving      Weak cough  - Appreciate pulmonary assistance  - 3% saline nebs  -dysphagia diet    History of DVT (deep vein thrombosis)  -resume Eliquis   -platelet count 152      Neuropathy  -continue gabapentin      Anxiety and depression  -Continue home medications       Benign prostatic hyperplasia  Currently on Flomax outpatient.  Plan:  -continue home medication      Hyperlipidemia  -continue statin      Controlled type 2 diabetes mellitus, without long-term current use of insulin  Patient's FSGs are uncontrolled due to hypoglycemia on current medication regimen.  Last A1c reviewed-   Lab Results   Component Value Date    HGBA1C 5.1 12/10/2021     Most recent fingerstick glucose reviewed-   Recent Labs   Lab 11/01/23  1302 11/01/23  1303 11/01/23  1350 11/01/23  1558   POCTGLUCOSE 46* 43* 165* 125*     Current correctional scale  none  Maintain anti-hyperglycemic dose as follows-   Antihyperglycemics (From admission, onward)    None        Hold Oral hypoglycemics while patient is in the hospital.  -currently on D5        Primary hypertension  -not currently on medication as lasix discontinued  due to soft blood pressures  -monitor blood pressure      Intellectual disability  - from group home      VTE Risk Mitigation (From admission, onward)         Ordered     Place sequential compression device  Until discontinued         10/24/23 1907     IP VTE LOW RISK PATIENT  Once         10/24/23  1907                Discharge Planning   GEOFF:      Code Status: Full Code   Is the patient medically ready for discharge?:     Reason for patient still in hospital (select all that apply): Patient trending condition, Laboratory test and Treatment  Discharge Plan A: Group Home                  Ghanshyam Penn MD  Department of Hospital Medicine   [facility]

## 2023-11-01 NOTE — PHYSICIAN QUERY
PT Name: Juan Francisco Parker  MR #: [id number]     DOCUMENTATION CLARIFICATION     CDS: Yulia Huitron RN, CCDS   Contact Information: [email or web address]    This form is a permanent document in the medical record.     Query Date: November 1, 2023    By submitting this query, we are merely seeking further clarification of documentation.  Please utilize your independent clinical judgment when addressing the question(s) below.  Provider, please provide the integumentary diagnosis related to the documentation of the Right Anterior Hip:   The Medical Record contains the following:    10/24/23 H&P: 58 year old with PMH diabetes mellitis type 2, peripheral neuropathy and mental disability presented to the ED for further evaluation of worsening altered mental status.  10/25/23 Wound Care, RN: Right anterior hip: full thickness tissue loss. Subcutaneous fat may be visible but bone, tendon or muscle are not exposed.           11/1/23 Wound Care, RN: Pressure injury to right hip again noted, stage 3, cleansed with saline and aquacel and foam dressing applied. Tissue loss: Full thickness       The clinical guidelines noted are only a system guideline. It does not replace the providers clinical judgment.    Per the National Pressure Injury Advisory Panel:   A pressure injury is localized damage to the skin and underlying soft tissue usually over a bony prominence or related to a medical or other device. The injury can present as intact skin or an open ulcer and may be painful. The injury occurs as a result of intense and/or prolonged pressure or pressure in combination with shear. The tolerance of soft tissue for pressure and shear may also be affected by microclimate, nutrition, perfusion, co-morbidities and condition of the soft tissue.       Stage 1 Pressure Injury:  Intact skin with a localized area of non-blanchable erythema, which may appear differently in darkly pigmented skin. Color changes do not include purple  or maroon discoloration; these may indicate deep tissue pressure injury.    Stage 2 Pressure Injury:  Partial-thickness loss of skin with exposed dermis. The wound bed is viable, pink or red, moist, and may also present as an intact or ruptured serum-filled blister.    Stage 3 Pressure Injury:  Full-thickness loss of skin, in which adipose (fat) is visible in the ulcer and granulation tissue and epibole (rolled wound edges) are often present. Slough and/or eschar may be visible. Undermining and tunneling may occur.    Stage 4 Pressure Injury:  Full-thickness skin and tissue loss with exposed or directly palpable fascia, muscle, tendon, ligament, cartilage or bone in the ulcer. Slough and/or eschar may be visible. Epibole (rolled edges), undermining and/or tunneling often occur.    Unstageable Pressure Injury:  Full-thickness skin and tissue loss in which the extent of tissue damage within the ulcer cannot be confirmed because it is obscured by slough or eschar. If slough or eschar is removed, a Stage 3 or Stage 4 pressure injury will be revealed.    Deep Tissue Pressure Injury:  Intact or non-intact skin with localized area of persistent non-blanchable deep red, maroon, purple discoloration or epidermal separation revealing a dark wound bed or blood filled blister. This injury results from intense and/or prolonged pressure and shear forces at the bone-muscle interface. The wound may evolve rapidly to reveal the actual extent of tissue injury, or may resolve without tissue loss. If necrotic tissue, subcutaneous tissue, granulation tissue, fascia, muscle or other underlying structures are visible, this indicates a full thickness pressure injury (Unstageable, Stage 3 or Stage 4). Do not use DTPI to describe vascular, traumatic, neuropathic, or dermatologic conditions.   Medical Device Related Pressure Injury: This describes an etiology. Medical device related pressure injuries result from the use of devices designed  and applied for diagnostic or therapeutic purposes. The resultant pressure injury generally conforms to the pattern or shape of the device. The injury should be staged using the staging system.    Mucosal Membrane Pressure Injury: Mucosal membrane pressure injury is found on mucous membranes with a history of a medical device in use at the location of the injury. Due to the anatomy of the tissue these ulcers cannot be staged.       Provider, please provide the integumentary diagnosis related to the documentation of the Right Anterior Hip:     [  x ] Pressure Injury/Decubitus Ulcer, Stage 3   [   ] Other Integumentary Diagnosis (please specify):______________       Please document in your progress notes daily for the duration of treatment until resolved and include in your discharge summary.    Reference:    JESSICA Mohr., [place], ALEIDA. CRISTELA., Goldberg, M., ARMANDO Thomas., ARMANDO Harp., & CRISTELA Orosco. (2016). Revised National Pressure Ulcer Advisory Panel Pressure Injury Staging System: Revised Pressure Injury Staging System. J Wound Ostomy Continence Nurs, 43(6), 585-597. doi:10.1097/[email or web address]    Form No.[id number]

## 2023-11-01 NOTE — PLAN OF CARE
Pt tolerated interventions well. Required CGA for bed mobility, ambulated 300ft MOD A using RW. Recommending moderate intesnity PT upon d/c.

## 2023-11-01 NOTE — PT/OT/SLP PROGRESS
Occupational Therapy   Treatment    Name: Juan Francisco Parker  MRN: [id number]  Admitting Diagnosis:  Sepsis       Recommendations:     Discharge Recommendations: Moderate Intensity Therapy  Discharge Equipment Recommendations:  to be determined by next level of care  Barriers to discharge:  None    Assessment:     Juan Francisco Parker is a 58 y.o. male with a medical diagnosis of Sepsis.  Performance deficits affecting function are weakness, gait instability, decreased upper extremity function, impaired endurance, impaired balance, decreased lower extremity function, decreased safety awareness, impaired self care skills, impaired functional mobility.     Rehab Prognosis:  Good; patient would benefit from acute skilled OT services to address these deficits and reach maximum level of function.       Plan:     Patient to be seen 2 x/week to address the above listed problems via self-care/home management, therapeutic activities, therapeutic exercises  Plan of Care Expires: 11/10/23  Plan of Care Reviewed with: patient    Subjective     Chief Complaint: none   Patient/Family Comments/goals: increase OOB activity  Pain/Comfort:  Pain Rating 1: 0/10  Pain Rating Post-Intervention 1: 0/10    Objective:     Communicated with: nurse prior to session.  Patient found supine with peripheral IV, telemetry, bed alarm (AVASYS) upon OT entry to room.    General Precautions: Standard, fall    Orthopedic Precautions:N/A  Braces: N/A  Respiratory Status: Room air     Occupational Performance:     Bed Mobility:    Patient completed Rolling/Turning to Right with contact guard assistance  Patient completed Scooting/Bridging with contact guard assistance  Patient completed Supine to Sit with contact guard assistance     Functional Mobility/Transfers:  Patient completed Sit <> Stand Transfer with moderate assistance  with  rolling walker   Patient completed Bed <> Chair Transfer using Stand Pivot technique with moderate assistance with rolling  walker  Functional Mobility: Pt ambulated x300 feet with RW mod A with max verbal cues for safety and sequencing with RW    AMPA 6 Click ADL: 14    Treatment & Education:  Pt bed mobility CGA. Sit to stand with RW mod A with posterior lean with mod A to correct.   Ambulated with RW x300 feet mod A with verbal cues for safety and sequencing with RW. Pt transferred to bedside chair with all needs met and within reach.    Patient left up in chair with all lines intact, call button in reach, chair alarm on, and nurse notified    GOALS:   Multidisciplinary Problems       Occupational Therapy Goals          Problem: Occupational Therapy    Goal Priority Disciplines Outcome Interventions   Occupational Therapy Goal     OT, PT/OT Ongoing, Progressing    Description: Goals to be met by: 11/10/23     Patient will increase functional independence with ADLs by performing:    UE Dressing with Mod Assistance.  Supine to sit with Mod Assistance.  Increased functional strength to BUE grossly by 1/2 MMT grades.                         Time Tracking:     OT Date of Treatment: 11/01/23  OT Start Time: 1100  OT Stop Time: 1125  OT Total Time (min): 25 min    Billable Minutes:Therapeutic Activity 25    JERRY Lees  OT/JELENA: OT          11/1/2023

## 2023-11-01 NOTE — PT/OT/SLP PROGRESS
"Physical Therapy Treatment    Patient Name:  Juan Francisco Parker   MRN:  [id number]    Recommendations:     Discharge Recommendations: Moderate Intensity Therapy  Discharge Equipment Recommendations: to be determined by next level of care  Barriers to discharge: none    Assessment:     Juan Francisco Parker is a 58 y.o. male admitted with a medical diagnosis of Sepsis.  He presents with the following impairments/functional limitations: weakness, impaired endurance, impaired functional mobility, gait instability, impaired balance, pain, decreased safety awareness, decreased lower extremity function, decreased coordination, decreased ROM.    Rehab Prognosis: Good; patient would benefit from acute skilled PT services to address these deficits and reach maximum level of function.    Recent Surgery: * No surgery found *      Plan:     During this hospitalization, patient to be seen 3 x/week to address the identified rehab impairments via gait training, therapeutic activities, therapeutic exercises and progress toward the following goals:    Plan of Care Expires:  11/10/23    Subjective     Chief Complaint: Pt is motivated to participate, "I was born ready"  Patient/Family Comments/goals: none stated  Pain/Comfort:  Pain Rating 1: 0/10      Objective:     Communicated with nurse Olga and epic chart review prior to session.  Patient found HOB elevated with peripheral IV, telemetry, bed alarm (HARMONY SYS) upon PT entry to room.     General Precautions: Standard, fall  Orthopedic Precautions: N/A  Braces: N/A  Respiratory Status: Room air     Functional Mobility:  Gait belt applied - Yes  Bed Mobility  Rolling Right: contact guard assistance  Scooting: contact guard assistance  Supine to Sit: contact guard assistance  Transfers  Sit to Stand: moderate assistance for balance and safety with rolling walker  Bed to Chair: moderate assistance with rolling walker using Step Transfer  Gait  Patient ambulated 300ft with rolling walker and " "moderate assistance. Patient demonstrates unsteady gait, decreased step length, and knee flexion . No c/o dizziness or SOB. Pt with posterior lean, frequent verbal and tactile cuing for RW management. All lines remained intact throughout ambulation trail.  Balance  Sitting: contact guard assistance  Standing: moderate assistance        AM-PAC 6 CLICK MOBILITY  Turning over in bed (including adjusting bedclothes, sheets and blankets)?: 3  Sitting down on and standing up from a chair with arms (e.g., wheelchair, bedside commode, etc.): 3  Moving from lying on back to sitting on the side of the bed?: 3  Moving to and from a bed to a chair (including a wheelchair)?: 2  Need to walk in hospital room?: 2  Climbing 3-5 steps with a railing?: 1 (NT)  Basic Mobility Total Score: 14       Treatment & Education:  Reviewed role of PT in acute care and POC. Pt tolerated interventions well.  Reviewed importance of OOB activities, activity pacing, and HEP (marching/hip flex, hip abd, heel slides/LAQ, quad sets, ankle pumps) in order to maintain/regain strength. Encouraged to sit up in chair for all meals. Reviewed proper use of RW for safety and to reduce risk of falling. Reviewed "call don't fall" policy and increased risk of falling due to weakness, instructed to utilize call bell for assistance with all transfers. Pt agreeable to all requests.    Patient left up in chair with all lines intact, call button in reach, chair alarm on, and nurse notified.    GOALS:   Multidisciplinary Problems       Physical Therapy Goals          Problem: Physical Therapy    Goal Priority Disciplines Outcome Goal Variances Interventions   Physical Therapy Goal     PT, PT/OT Ongoing, Progressing     Description: Goals to be met by 11/10/23.  1. Pt will complete bed mobility MOD I.  2. Pt will complete sit to stand SBA.  3. Pt will transfer bed to chair MIN A.  4. Pt will increase AMPAC score by 2 points to progress functional mobility.            "            Time Tracking:     PT Received On: 11/01/23  PT Start Time: 1102     PT Stop Time: 1127  PT Total Time (min): 25 min     Billable Minutes: Gait Training 15min and Therapeutic Activity 10min    Treatment Type: Treatment  PT/PTA: PT     Number of PTA visits since last PT visit: 0     11/01/2023

## 2023-11-02 LAB
ALBUMIN SERPL BCP-MCNC: 2.1 G/DL (ref 3.5–5.2)
ALP SERPL-CCNC: 77 U/L (ref 55–135)
ALT SERPL W/O P-5'-P-CCNC: 21 U/L (ref 10–44)
ANION GAP SERPL CALC-SCNC: 6 MMOL/L (ref 8–16)
AST SERPL-CCNC: 15 U/L (ref 10–40)
BASOPHILS # BLD AUTO: 0.01 K/UL (ref 0–0.2)
BASOPHILS NFR BLD: 0.4 % (ref 0–1.9)
BILIRUB SERPL-MCNC: 0.2 MG/DL (ref 0.1–1)
BUN SERPL-MCNC: 16 MG/DL (ref 6–20)
CALCIUM SERPL-MCNC: 7.4 MG/DL (ref 8.7–10.5)
CHLORIDE SERPL-SCNC: 103 MMOL/L (ref 95–110)
CO2 SERPL-SCNC: 26 MMOL/L (ref 23–29)
CORTIS SERPL-MCNC: 4.8 UG/DL
CREAT SERPL-MCNC: 0.5 MG/DL (ref 0.5–1.4)
DIFFERENTIAL METHOD: ABNORMAL
EOSINOPHIL # BLD AUTO: 0.1 K/UL (ref 0–0.5)
EOSINOPHIL NFR BLD: 3.1 % (ref 0–8)
ERYTHROCYTE [DISTWIDTH] IN BLOOD BY AUTOMATED COUNT: 16 % (ref 11.5–14.5)
EST. GFR  (NO RACE VARIABLE): >60 ML/MIN/1.73 M^2
GIANT PLATELETS BLD QL SMEAR: PRESENT
GLUCOSE SERPL-MCNC: 106 MG/DL (ref 70–110)
HCT VFR BLD AUTO: 23.8 % (ref 40–54)
HGB BLD-MCNC: 7.9 G/DL (ref 14–18)
IMM GRANULOCYTES # BLD AUTO: 0.04 K/UL (ref 0–0.04)
IMM GRANULOCYTES NFR BLD AUTO: 1.5 % (ref 0–0.5)
LYMPHOCYTES # BLD AUTO: 0.6 K/UL (ref 1–4.8)
LYMPHOCYTES NFR BLD: 22.4 % (ref 18–48)
MAGNESIUM SERPL-MCNC: 1.9 MG/DL (ref 1.6–2.6)
MCH RBC QN AUTO: 32.9 PG (ref 27–31)
MCHC RBC AUTO-ENTMCNC: 33.2 G/DL (ref 32–36)
MCV RBC AUTO: 99 FL (ref 82–98)
MONOCYTES # BLD AUTO: 0.4 K/UL (ref 0.3–1)
MONOCYTES NFR BLD: 16.2 % (ref 4–15)
NEUTROPHILS # BLD AUTO: 1.5 K/UL (ref 1.8–7.7)
NEUTROPHILS NFR BLD: 56.4 % (ref 38–73)
NRBC BLD-RTO: 0 /100 WBC
OVALOCYTES BLD QL SMEAR: ABNORMAL
PHOSPHATE SERPL-MCNC: 3 MG/DL (ref 2.7–4.5)
PLATELET # BLD AUTO: 156 K/UL (ref 150–450)
PLATELET BLD QL SMEAR: ABNORMAL
PMV BLD AUTO: 9.6 FL (ref 9.2–12.9)
POCT GLUCOSE: 113 MG/DL (ref 70–110)
POCT GLUCOSE: 78 MG/DL (ref 70–110)
POCT GLUCOSE: 85 MG/DL (ref 70–110)
POCT GLUCOSE: 92 MG/DL (ref 70–110)
POLYCHROMASIA BLD QL SMEAR: ABNORMAL
POTASSIUM SERPL-SCNC: 4.1 MMOL/L (ref 3.5–5.1)
PROT SERPL-MCNC: 4.7 G/DL (ref 6–8.4)
RBC # BLD AUTO: 2.4 M/UL (ref 4.6–6.2)
SODIUM SERPL-SCNC: 135 MMOL/L (ref 136–145)
WBC # BLD AUTO: 2.59 K/UL (ref 3.9–12.7)

## 2023-11-02 PROCEDURE — 82024 ASSAY OF ACTH: CPT | Performed by: INTERNAL MEDICINE

## 2023-11-02 PROCEDURE — 11000001 HC ACUTE MED/SURG PRIVATE ROOM

## 2023-11-02 PROCEDURE — 25000003 PHARM REV CODE 250: Performed by: HOSPITALIST

## 2023-11-02 PROCEDURE — 83735 ASSAY OF MAGNESIUM: CPT | Performed by: HOSPITALIST

## 2023-11-02 PROCEDURE — 84100 ASSAY OF PHOSPHORUS: CPT | Performed by: HOSPITALIST

## 2023-11-02 PROCEDURE — 82533 TOTAL CORTISOL: CPT | Performed by: INTERNAL MEDICINE

## 2023-11-02 PROCEDURE — 97530 THERAPEUTIC ACTIVITIES: CPT | Mod: CQ

## 2023-11-02 PROCEDURE — 85025 COMPLETE CBC W/AUTO DIFF WBC: CPT | Performed by: HOSPITALIST

## 2023-11-02 PROCEDURE — 97116 GAIT TRAINING THERAPY: CPT | Mod: CQ

## 2023-11-02 PROCEDURE — 97530 THERAPEUTIC ACTIVITIES: CPT

## 2023-11-02 PROCEDURE — 80053 COMPREHEN METABOLIC PANEL: CPT | Performed by: HOSPITALIST

## 2023-11-02 PROCEDURE — 97535 SELF CARE MNGMENT TRAINING: CPT

## 2023-11-02 RX ADMIN — OXCARBAZEPINE 600 MG: 150 TABLET, FILM COATED ORAL at 08:11

## 2023-11-02 RX ADMIN — POTASSIUM CHLORIDE 20 MEQ: 1500 TABLET, EXTENDED RELEASE ORAL at 08:11

## 2023-11-02 RX ADMIN — QUETIAPINE FUMARATE 400 MG: 100 TABLET ORAL at 08:11

## 2023-11-02 RX ADMIN — OXCARBAZEPINE 600 MG: 150 TABLET, FILM COATED ORAL at 09:11

## 2023-11-02 RX ADMIN — GABAPENTIN 300 MG: 300 CAPSULE ORAL at 08:11

## 2023-11-02 RX ADMIN — DIVALPROEX SODIUM 1000 MG: 500 TABLET, FILM COATED, EXTENDED RELEASE ORAL at 08:11

## 2023-11-02 RX ADMIN — ACETAMINOPHEN 650 MG: 325 TABLET ORAL at 03:11

## 2023-11-02 RX ADMIN — ATORVASTATIN CALCIUM 10 MG: 10 TABLET, FILM COATED ORAL at 09:11

## 2023-11-02 RX ADMIN — RISPERIDONE 0.5 MG: 0.5 TABLET ORAL at 08:11

## 2023-11-02 RX ADMIN — GABAPENTIN 300 MG: 300 CAPSULE ORAL at 09:11

## 2023-11-02 RX ADMIN — RISPERIDONE 0.5 MG: 0.5 TABLET ORAL at 09:11

## 2023-11-02 RX ADMIN — FLUOXETINE 40 MG: 20 CAPSULE ORAL at 09:11

## 2023-11-02 RX ADMIN — GABAPENTIN 300 MG: 300 CAPSULE ORAL at 03:11

## 2023-11-02 RX ADMIN — TAMSULOSIN HYDROCHLORIDE 0.4 MG: 0.4 CAPSULE ORAL at 09:11

## 2023-11-02 RX ADMIN — POTASSIUM CHLORIDE 20 MEQ: 1500 TABLET, EXTENDED RELEASE ORAL at 09:11

## 2023-11-02 RX ADMIN — DIVALPROEX SODIUM 250 MG: 500 TABLET, FILM COATED, EXTENDED RELEASE ORAL at 09:11

## 2023-11-02 NOTE — PLAN OF CARE
[facility] unable to accept.    Spoke with Cinda with the group [facility].  She is fine with [facility] of  or [facility].  Spoke with Lo at the facilities.  Request patient got to [facility] as is training new admission coordinator at [facility].    Secure chat to Dr. Penn with the above information.       11/02/23 1326   Post-Acute Status   Post-Acute Authorization Placement   Post-Acute Placement Status Pending medical clearance/testing   Discharge Plan   Discharge Plan A Skilled Nursing Facility

## 2023-11-02 NOTE — ASSESSMENT & PLAN NOTE
- currently on D5 NS  -requiring treatment with hypoglycemia protocol  -obtained morning cortisol and ACTH, may have some degree of cortisol insuffiency  -monitor

## 2023-11-02 NOTE — PT/OT/SLP PROGRESS
Occupational Therapy   Treatment    Name: Juan Francisco Parker  MRN: [id number]  Admitting Diagnosis:  Sepsis       Recommendations:     Discharge Recommendations: Moderate Intensity Therapy  Discharge Equipment Recommendations:  to be determined by next level of care  Barriers to discharge:  None    Assessment:     Juan Francisco Parker is a 58 y.o. male with a medical diagnosis of Sepsis.  Performance deficits affecting function are weakness, gait instability, decreased upper extremity function, impaired endurance, impaired balance, decreased lower extremity function, decreased safety awareness, impaired self care skills, impaired functional mobility.     Rehab Prognosis:  Good; patient would benefit from acute skilled OT services to address these deficits and reach maximum level of function.       Plan:     Patient to be seen 2 x/week to address the above listed problems via self-care/home management, therapeutic activities, therapeutic exercises  Plan of Care Expires: 11/10/23  Plan of Care Reviewed with: patient    Subjective     Chief Complaint: none   Patient/Family Comments/goals: return to PLOF  Pain/Comfort:  Pain Rating 1: 0/10  Pain Rating Post-Intervention 1: 0/10    Objective:     Communicated with: nurse prior to session.  Patient found supine with telemetry, PICC line (AVASYS) upon OT entry to room.    General Precautions: Standard, fall    Orthopedic Precautions:N/A  Braces: N/A  Respiratory Status: Room air     Occupational Performance:     Bed Mobility:    Patient completed Rolling/Turning to Right with stand by assistance  Patient completed Scooting/Bridging with stand by assistance  Patient completed Supine to Sit with stand by assistance     Functional Mobility/Transfers:  Patient completed Sit <> Stand Transfer with moderate assistance  with  rolling walker   Patient completed Bed <> Chair Transfer using Stand Pivot technique with moderate assistance with rolling walker  Functional Mobility: Pt gait trained  x300 feet with RW mod A with max verbal cues for safety with use of RW and turns    Activities of Daily Living:  Toileting: total assistance incontinent episode- total A for perineal hygiene and donning clean brief while patient stood x5 minutes      [facility] 6 Click ADL: 14    Treatment & Education:  Following gait and brief change, patient transferred to bedside chair mod A with max [place] cues for hand placement for controlled decent. Pt educated on call dont fall procedures. Pt left with tray placed in front of patient and call button in reach. Pt agreeable to stay up in chair until lunch.     Patient left up in chair with all lines intact, call button in reach, chair alarm on, and nursing notified    GOALS:   Multidisciplinary Problems       Occupational Therapy Goals          Problem: Occupational Therapy    Goal Priority Disciplines Outcome Interventions   Occupational Therapy Goal     OT, PT/OT Ongoing, Progressing    Description: Goals to be met by: 11/10/23     Patient will increase functional independence with ADLs by performing:    UE Dressing with Mod Assistance.  Supine to sit with Mod Assistance.  Increased functional strength to BUE grossly by 1/2 MMT grades.                         Time Tracking:     OT Date of Treatment: 11/02/23  OT Start Time: 1115  OT Stop Time: 1140  OT Total Time (min): 25 min    Billable Minutes:Self Care/Home Management 10  Therapeutic Activity 15    JERRY Lees  OT/JELENA: OT          11/2/2023

## 2023-11-02 NOTE — PT/OT/SLP PROGRESS
Physical Therapy  Treatment    Juan Francisco Parker   MRN: [id number]   Admitting Diagnosis: Sepsis    PT Received On: 11/02/23  PT Start Time: 1105     PT Stop Time: 1135    PT Total Time (min): 30 min       Billable Minutes:  Gait Training 15 and Therapeutic Activity 15    Treatment Type: Treatment  PT/PTA: PTA     Number of PTA visits since last PT visit: 1       General Precautions: Standard, fall  Orthopedic Precautions: N/A  Braces: N/A  Respiratory Status: Room air    Spiritual, Cultural Beliefs, Religion Practices, Values that Affect Care: no    Subjective:  Communicated with patient's nurse and completed Epic chart review prior to session.  Patient agreed to PT session.     Pain/Comfort  Pain Rating 1: 0/10  Pain Rating Post-Intervention 1: 0/10    Objective:   Patient found with: telemetry, Other (comments), PICC line (AVASYS)    Supine > sit EOB: SBA    STS from EOB > RW: Mod A (VC for safety w/ RW mgmt)    300ft w/ RW Mod A (x1 lateral LOB towards end of trial; multiple and frequent VC for safety w/ RW mgmt and to remain within JESUSITA of AD; increased time to complete)    Stand pivot T/F to chair: Mod A (VC for safety w/ RW mgmt and to remain within JESUSITA of AD throughout transition)    Stand x5 min total with BUE self support on RW while therapist cleaned patient and changed brief. Total A for cleaning and brief change.     Reviewed AROM TE to BLE including: hip flex/ext, knee flex/ext, ankle PF/DF  To be completed a minimum of 10 reps for each LE in order to promote return of function, strength and ROM.      Educated patient on importance of increased tolerance to upright position and direct impact on CV endurance and strength. Patient encouraged to sit up in chair/ EOB, for a minimum of 2 consecutive hours, 3x per day. Encouraged patient to perform AROM TE to BLE throughout the day within all available planes of motion. Re enforced importance of utilizing call light to meet needs in room and not attempt to get up  without staff assistance. Patient verbalized understanding and agreed to comply.      AM-PAC 6 CLICK MOBILITY  How much help from another person does this patient currently need?   1 = Unable, Total/Dependent Assistance  2 = A lot, Maximum/Moderate Assistance  3 = A little, Minimum/Contact Guard/Supervision  4 = None, Modified Falls/Independent    Turning over in bed (including adjusting bedclothes, sheets and blankets)?: 3  Sitting down on and standing up from a chair with arms (e.g., wheelchair, bedside commode, etc.): 3  Moving from lying on back to sitting on the side of the bed?: 3  Moving to and from a bed to a chair (including a wheelchair)?: 3  Need to walk in hospital room?: 3  Climbing 3-5 steps with a railing?: 1 (NT)  Basic Mobility Total Score: 16    AM-PAC Raw Score CMS G-Code Modifier Level of Impairment Assistance   6 % Total / Unable   7 - 9 CM 80 - 100% Maximal Assist   10 - 14 CL 60 - 80% Moderate Assist   15 - 19 CK 40 - 60% Moderate Assist   20 - 22 CJ 20 - 40% Minimal Assist   23 CI 1-20% SBA / CGA   24 CH 0% Independent/ Mod I     Patient left up in chair with call button in reach, chair alarm on, and AVASYS present.    Assessment:  Juan Francisco Parker is a 58 y.o. male with a medical diagnosis of Sepsis and presents with overall decline in functional mobility. Patient would continue to benefit from skilled PT to address functional limitations listed below in order to return to PLOF/decrease caregiver burden.     Rehab identified problem list/impairments: weakness, impaired endurance, impaired self care skills, impaired functional mobility, gait instability, impaired balance, impaired cognition, decreased coordination, decreased upper extremity function, decreased lower extremity function, decreased safety awareness, decreased ROM, impaired coordination    Rehab potential is fair.    Activity tolerance: Fair    Discharge recommendations: Moderate Intensity Therapy      Barriers to  discharge:      Equipment recommendations: to be determined by next level of care     GOALS:   Multidisciplinary Problems       Physical Therapy Goals          Problem: Physical Therapy    Goal Priority Disciplines Outcome Goal Variances Interventions   Physical Therapy Goal     PT, PT/OT Ongoing, Progressing     Description: Goals to be met by 11/10/23.  1. Pt will complete bed mobility MOD I.  2. Pt will complete sit to stand SBA.  3. Pt will transfer bed to chair MIN A.  4. Pt will increase AMPAC score by 2 points to progress functional mobility.                       PLAN:    Patient to be seen 3 x/week to address the above listed problems via gait training, therapeutic activities, therapeutic exercises  Plan of Care expires: 11/10/23  Plan of Care reviewed with: patient         11/02/2023

## 2023-11-02 NOTE — PROGRESS NOTES
[facility]  Hospital Medicine  Progress Note    Patient Name: Juan Francisco Parker  MRN: [id number]  Patient Class: IP- Inpatient   Admission Date: 10/24/2023  Length of Stay: 9 days  Attending Physician: Ghanshyam Penn MD  Primary Care Provider: [facility] Primary Doctor        Subjective:     Principal Problem:Sepsis        HPI:  Juan Francisco Parker is a 58 y.o. male with a PMH  has a past medical history of Diabetes mellitus, type 2 (2004), Hyperlipidemia, Hypertension, Mental disability, Paranoid personality disorder, Peripheral neuropathy, and Unspecified intellectual disabilities. who presented to the ED to the ED via EMS for further evaluation of worsening altered mental status since this morning.  History mainly obtained through chart review, ED sign-out, and caregiver at bedside as patient has underlying intellectual disability and only able to provide minimal information.  Patient has history of recurrent UTIs and was complaining of belly pain over the past few days.  Caregiver unsure of patient's last bowel movement but has continued to eat pureed food and drink fluids.  Patient usually ambulates with aid of wheelchair but is able to stand and walk around but has been mostly bed-bound the past few days.  No known alleviating or aggravating factors with noted with all other review of systems negative except as stated above.  Initial workup in the ED revealed patient met SIRS criteria for sepsis with UA positive for UTI.  Patient initiated on sepsis protocol and continued on Rocephin with cultures obtained and pending.  Patient being admitted to Hospital Medicine inpatient for continued medical management.       PCP: [facility] Primary Doctor        Overview/Hospital Course:  Mr Parker was initially admitted with severe sepsis requiring fluid resuscitation and a leela hugger.  With improvement in BP the fluids were stopped and with improvements in his temp the leela hugger has been weaned, requiring intermittent restarts.   On day 2 of hospitalization patient because lethargic  He appeared to be trying to cough up mucus but due to extreme weakness was not able to do so.  After aggressive suctioning he was able to have some mucus removed, but continued to have a weak cough. Pulmonology  was consulted.  The patient was started on 3% saline nebs to help with the congestion.  Chest xray Bi basilar low-grade infiltrates and/or atelectasis.  PT/OT/ST  - dysphagia diet and SNF. Urine culture Providencia rettgeri. Patient completed 7 day course of ceftriaxone. Hematology consulted due to pancytopenia. Labs ordered as requested and no significant deficiencies noted. His mental status improved. He ishaving intermittent hypoglycemia requiring D5 infusion and dextrose tablets.       Interval History: patient with marginal sugars despite D5, Boost and eating all meals. Am cortisol low normal    Review of Systems  Objective:     Vital Signs (Most Recent):  Temp: 98.8 °F (37.1 °C) (11/02/23 1540)  Pulse: 81 (11/02/23 1540)  Resp: 18 (11/02/23 1540)  BP: (!) 104/49 (11/02/23 1540)  SpO2: 99 % (11/02/23 1540) Vital Signs (24h Range):  Temp:  [97.6 °F (36.4 °C)-98.8 °F (37.1 °C)] 98.8 °F (37.1 °C)  Pulse:  [66-93] 81  Resp:  [12-18] 18  SpO2:  [91 %-99 %] 99 %  BP: ()/(45-57) 104/49     Weight: 74.3 kg (163 lb 12.8 oz)  Body mass index is 21.61 kg/m².  No intake or output data in the 24 hours ending 11/02/23 1709      Physical Exam  HENT:      Head: Normocephalic and atraumatic.   Cardiovascular:      Rate and Rhythm: Normal rate and regular rhythm.      Heart sounds: No murmur heard.  Pulmonary:      Effort: Pulmonary effort is normal. No respiratory distress.      Breath sounds: Normal breath sounds. No wheezing.   Abdominal:      General: Bowel sounds are normal. There is no distension.      Palpations: Abdomen is soft.      Tenderness: There is no abdominal tenderness.   Musculoskeletal:         General: No swelling.   Skin:     General: Skin  "is warm and dry.   Neurological:      Mental Status: He is alert and oriented to person, place, and time. Mental status is at baseline.             Significant Labs: All pertinent labs within the past 24 hours have been reviewed.  CBC:   Recent Labs   Lab 11/01/23 0424 11/02/23  0330   WBC 2.76* 2.59*   HGB 9.3* 7.9*   HCT 28.8* 23.8*    156     CMP:   Recent Labs   Lab 11/01/23 0424 11/02/23  0330    135*   K 4.2 4.1    103   CO2 22* 26   GLU 91 106   BUN 13 16   CREATININE 0.6 0.5   CALCIUM 7.8* 7.4*   PROT 5.2* 4.7*   ALBUMIN 2.2* 2.1*   BILITOT 0.2 0.2   ALKPHOS 86 77   AST 20 15   ALT 20 21   ANIONGAP 10 6*       Significant Imaging: I have reviewed all pertinent imaging results/findings within the past 24 hours.      Assessment/Plan:      * Sepsis  This patient does have evidence of infective focus  My overall impression is sepsis.  Source: Urinary Tract  Antibiotics given-   Antibiotics (72h ago, onward)    None        Latest lactate reviewed-  No results for input(s): "LACTATE" in the last 72 hours.  Organ dysfunction indicated by Encephalopathy    Fluid challenge Ideal Body Weight- The patient's ideal body weight is Ideal body weight: 79.9 kg (176 lb 2.4 oz) which will be used to calculate fluid bolus of 30 ml/kg for treatment of septic shock.      Post- resuscitation assessment No - Post resuscitation assessment not needed     Will Not start Pressors- Levophed for MAP of 65  Source control achieved by: Rocephin         -resolved  -completed 7 day course of abx    Hypoglycemia  - currently on D5 NS  -requiring treatment with hypoglycemia protocol  -obtained morning cortisol and ACTH, may have some degree of cortisol insuffiency  -monitor      Pancytopenia  This patient is found to have pancytopenia, the likely etiology is mulitfactorial, will monitor CBC Daily. Will transfuse red blood cells if the hemoglobin is <7g/dL (or <8 in the setting of ACS). Will transfuse platelets if platelet " count is <50k (if undergoing surgical procedure or have active bleeding). Hold DVT prophylaxis if platelets are <50k. The patient's hemoglobin, white blood cell count, and platelet count results have been reviewed and are listed below.  Recent Labs   Lab 11/02/23  0330   HGB 7.9*   WBC 2.59*          - Hematology evaluated, labs ordered as recommended  -no significant deficiencies  -platelet count improving      Weak cough  - Appreciate pulmonary assistance  - 3% saline nebs  -dysphagia diet    History of DVT (deep vein thrombosis)  -continue Eliquis   -platelet count stable      Neuropathy  -continue gabapentin      Anxiety and depression  -Continue home medications       Benign prostatic hyperplasia  Currently on Flomax outpatient.  Plan:  -continue home medication      Hyperlipidemia  -continue statin      Controlled type 2 diabetes mellitus, without long-term current use of insulin  Patient's FSGs are uncontrolled due to hypoglycemia on current medication regimen.  Last A1c reviewed-   Lab Results   Component Value Date    HGBA1C 5.1 12/10/2021     Most recent fingerstick glucose reviewed-   Recent Labs   Lab 11/01/23  2235 11/02/23  0637 11/02/23  1137 11/02/23  1547   POCTGLUCOSE 81 85 78 92     Current correctional scale  none  Maintain anti-hyperglycemic dose as follows-   Antihyperglycemics (From admission, onward)    None        Hold Oral hypoglycemics while patient is in the hospital.  -currently on D5        Primary hypertension  -not currently on medication as lasix discontinued  due to soft blood pressures  -monitor blood pressure      Intellectual disability  - from group home      VTE Risk Mitigation (From admission, onward)         Ordered     Place sequential compression device  Until discontinued         10/24/23 1907     IP VTE LOW RISK PATIENT  Once         10/24/23 1907                Discharge Planning   GEOFF:      Code Status: Full Code   Is the patient medically ready for discharge?:      Reason for patient still in hospital (select all that apply): Patient trending condition, Laboratory test and Treatment  Discharge Plan A: Skilled Nursing Facility                  Ghanshyam Penn MD  Department of Hospital Medicine   [facility]

## 2023-11-02 NOTE — PLAN OF CARE
Spoke with Agueda at [facility].  Patient is clinically accepted and waiting for medical clearance and 142.   will notify Agueda when 142 received.       11/02/23 0856   Post-Acute Status   Post-Acute Authorization Placement   Post-Acute Placement Status Pending medical clearance/testing   Discharge Plan   Discharge Plan A Skilled Nursing Facility

## 2023-11-02 NOTE — PLAN OF CARE
Pt bed mobility SBA. Gait trained x300 feet with RW mod A with max verbal cues for safety with RW and turns. Stood x5 min for perineal care total A due to incontinent episode. Transferred to bedside chair mod A.   Rec moderate intensity therapy at [facility]

## 2023-11-02 NOTE — SUBJECTIVE & OBJECTIVE
Interval History: patient with marginal sugars despite D5, Boost and eating all meals. Am cortisol low normal    Review of Systems  Objective:     Vital Signs (Most Recent):  Temp: 98.8 °F (37.1 °C) (11/02/23 1540)  Pulse: 81 (11/02/23 1540)  Resp: 18 (11/02/23 1540)  BP: (!) 104/49 (11/02/23 1540)  SpO2: 99 % (11/02/23 1540) Vital Signs (24h Range):  Temp:  [97.6 °F (36.4 °C)-98.8 °F (37.1 °C)] 98.8 °F (37.1 °C)  Pulse:  [66-93] 81  Resp:  [12-18] 18  SpO2:  [91 %-99 %] 99 %  BP: ()/(45-57) 104/49     Weight: 74.3 kg (163 lb 12.8 oz)  Body mass index is 21.61 kg/m².  No intake or output data in the 24 hours ending 11/02/23 1709      Physical Exam  HENT:      Head: Normocephalic and atraumatic.   Cardiovascular:      Rate and Rhythm: Normal rate and regular rhythm.      Heart sounds: No murmur heard.  Pulmonary:      Effort: Pulmonary effort is normal. No respiratory distress.      Breath sounds: Normal breath sounds. No wheezing.   Abdominal:      General: Bowel sounds are normal. There is no distension.      Palpations: Abdomen is soft.      Tenderness: There is no abdominal tenderness.   Musculoskeletal:         General: No swelling.   Skin:     General: Skin is warm and dry.   Neurological:      Mental Status: He is alert and oriented to person, place, and time. Mental status is at baseline.             Significant Labs: All pertinent labs within the past 24 hours have been reviewed.  CBC:   Recent Labs   Lab 11/01/23 0424 11/02/23 0330   WBC 2.76* 2.59*   HGB 9.3* 7.9*   HCT 28.8* 23.8*    156     CMP:   Recent Labs   Lab 11/01/23 0424 11/02/23 0330    135*   K 4.2 4.1    103   CO2 22* 26   GLU 91 106   BUN 13 16   CREATININE 0.6 0.5   CALCIUM 7.8* 7.4*   PROT 5.2* 4.7*   ALBUMIN 2.2* 2.1*   BILITOT 0.2 0.2   ALKPHOS 86 77   AST 20 15   ALT 20 21   ANIONGAP 10 6*       Significant Imaging: I have reviewed all pertinent imaging results/findings within the past 24 hours.

## 2023-11-02 NOTE — ASSESSMENT & PLAN NOTE
This patient is found to have pancytopenia, the likely etiology is mulitfactorial, will monitor CBC Daily. Will transfuse red blood cells if the hemoglobin is <7g/dL (or <8 in the setting of ACS). Will transfuse platelets if platelet count is <50k (if undergoing surgical procedure or have active bleeding). Hold DVT prophylaxis if platelets are <50k. The patient's hemoglobin, white blood cell count, and platelet count results have been reviewed and are listed below.  Recent Labs   Lab 11/02/23  0330   HGB 7.9*   WBC 2.59*          - Hematology evaluated, labs ordered as recommended  -no significant deficiencies  -platelet count improving
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
## 2023-11-02 NOTE — ASSESSMENT & PLAN NOTE
Patient's FSGs are uncontrolled due to hypoglycemia on current medication regimen.  Last A1c reviewed-   Lab Results   Component Value Date    HGBA1C 5.1 12/10/2021     Most recent fingerstick glucose reviewed-   Recent Labs   Lab 11/01/23  2235 11/02/23  0637 11/02/23  1137 11/02/23  1547   POCTGLUCOSE 81 85 78 92     Current correctional scale  none  Maintain anti-hyperglycemic dose as follows-   Antihyperglycemics (From admission, onward)    None        Hold Oral hypoglycemics while patient is in the hospital.  -currently on D5

## 2023-11-03 LAB
ACTH PLAS-MCNC: 5 PG/ML (ref 0–46)
ALBUMIN SERPL BCP-MCNC: 2.4 G/DL (ref 3.5–5.2)
ALP SERPL-CCNC: 78 U/L (ref 55–135)
ALT SERPL W/O P-5'-P-CCNC: 19 U/L (ref 10–44)
ANION GAP SERPL CALC-SCNC: 7 MMOL/L (ref 8–16)
ANISOCYTOSIS BLD QL SMEAR: SLIGHT
AST SERPL-CCNC: 17 U/L (ref 10–40)
BASOPHILS # BLD AUTO: 0.03 K/UL (ref 0–0.2)
BASOPHILS NFR BLD: 1.1 % (ref 0–1.9)
BILIRUB SERPL-MCNC: 0.2 MG/DL (ref 0.1–1)
BUN SERPL-MCNC: 13 MG/DL (ref 6–20)
CALCIUM SERPL-MCNC: 8.1 MG/DL (ref 8.7–10.5)
CHLORIDE SERPL-SCNC: 101 MMOL/L (ref 95–110)
CO2 SERPL-SCNC: 28 MMOL/L (ref 23–29)
CREAT SERPL-MCNC: 0.6 MG/DL (ref 0.5–1.4)
DIFFERENTIAL METHOD: ABNORMAL
EOSINOPHIL # BLD AUTO: 0.1 K/UL (ref 0–0.5)
EOSINOPHIL NFR BLD: 3.8 % (ref 0–8)
ERYTHROCYTE [DISTWIDTH] IN BLOOD BY AUTOMATED COUNT: 17 % (ref 11.5–14.5)
EST. GFR  (NO RACE VARIABLE): >60 ML/MIN/1.73 M^2
GLUCOSE SERPL-MCNC: 88 MG/DL (ref 70–110)
HCT VFR BLD AUTO: 27.9 % (ref 40–54)
HGB BLD-MCNC: 8.7 G/DL (ref 14–18)
IMM GRANULOCYTES # BLD AUTO: 0.06 K/UL (ref 0–0.04)
IMM GRANULOCYTES NFR BLD AUTO: 2.3 % (ref 0–0.5)
LYMPHOCYTES # BLD AUTO: 0.6 K/UL (ref 1–4.8)
LYMPHOCYTES NFR BLD: 24.3 % (ref 18–48)
MAGNESIUM SERPL-MCNC: 1.9 MG/DL (ref 1.6–2.6)
MCH RBC QN AUTO: 31.9 PG (ref 27–31)
MCHC RBC AUTO-ENTMCNC: 31.2 G/DL (ref 32–36)
MCV RBC AUTO: 102 FL (ref 82–98)
MONOCYTES # BLD AUTO: 0.5 K/UL (ref 0.3–1)
MONOCYTES NFR BLD: 18.6 % (ref 4–15)
NEUTROPHILS # BLD AUTO: 1.3 K/UL (ref 1.8–7.7)
NEUTROPHILS NFR BLD: 49.9 % (ref 38–73)
NRBC BLD-RTO: 0 /100 WBC
OVALOCYTES BLD QL SMEAR: ABNORMAL
PHOSPHATE SERPL-MCNC: 3.4 MG/DL (ref 2.7–4.5)
PLATELET # BLD AUTO: 258 K/UL (ref 150–450)
PLATELET BLD QL SMEAR: ABNORMAL
PMV BLD AUTO: 9.7 FL (ref 9.2–12.9)
POCT GLUCOSE: 109 MG/DL (ref 70–110)
POCT GLUCOSE: 167 MG/DL (ref 70–110)
POCT GLUCOSE: 58 MG/DL (ref 70–110)
POCT GLUCOSE: 66 MG/DL (ref 70–110)
POCT GLUCOSE: 70 MG/DL (ref 70–110)
POLYCHROMASIA BLD QL SMEAR: ABNORMAL
POTASSIUM SERPL-SCNC: 4.8 MMOL/L (ref 3.5–5.1)
PROT SERPL-MCNC: 5.3 G/DL (ref 6–8.4)
RBC # BLD AUTO: 2.73 M/UL (ref 4.6–6.2)
SODIUM SERPL-SCNC: 136 MMOL/L (ref 136–145)
WBC # BLD AUTO: 2.63 K/UL (ref 3.9–12.7)

## 2023-11-03 PROCEDURE — 97530 THERAPEUTIC ACTIVITIES: CPT

## 2023-11-03 PROCEDURE — 25000003 PHARM REV CODE 250: Performed by: FAMILY MEDICINE

## 2023-11-03 PROCEDURE — A4216 STERILE WATER/SALINE, 10 ML: HCPCS | Performed by: FAMILY MEDICINE

## 2023-11-03 PROCEDURE — 84100 ASSAY OF PHOSPHORUS: CPT | Performed by: HOSPITALIST

## 2023-11-03 PROCEDURE — 11000001 HC ACUTE MED/SURG PRIVATE ROOM

## 2023-11-03 PROCEDURE — 83735 ASSAY OF MAGNESIUM: CPT | Performed by: HOSPITALIST

## 2023-11-03 PROCEDURE — 25000003 PHARM REV CODE 250: Performed by: INTERNAL MEDICINE

## 2023-11-03 PROCEDURE — 25000003 PHARM REV CODE 250: Performed by: HOSPITALIST

## 2023-11-03 PROCEDURE — 97116 GAIT TRAINING THERAPY: CPT | Mod: CQ

## 2023-11-03 PROCEDURE — 80053 COMPREHEN METABOLIC PANEL: CPT | Performed by: HOSPITALIST

## 2023-11-03 PROCEDURE — 85025 COMPLETE CBC W/AUTO DIFF WBC: CPT | Performed by: HOSPITALIST

## 2023-11-03 PROCEDURE — 36415 COLL VENOUS BLD VENIPUNCTURE: CPT | Performed by: HOSPITALIST

## 2023-11-03 PROCEDURE — 97530 THERAPEUTIC ACTIVITIES: CPT | Mod: CQ

## 2023-11-03 PROCEDURE — 97535 SELF CARE MNGMENT TRAINING: CPT

## 2023-11-03 RX ORDER — HYDROCORTISONE 10 MG/1
10 TABLET ORAL DAILY
Status: DISCONTINUED | OUTPATIENT
Start: 2023-11-03 | End: 2023-11-05 | Stop reason: HOSPADM

## 2023-11-03 RX ORDER — HYDROCORTISONE 5 MG/1
5 TABLET ORAL NIGHTLY
Status: DISCONTINUED | OUTPATIENT
Start: 2023-11-03 | End: 2023-11-05 | Stop reason: HOSPADM

## 2023-11-03 RX ADMIN — HYDROCORTISONE 5 MG: 5 TABLET ORAL at 08:11

## 2023-11-03 RX ADMIN — SODIUM CHLORIDE, PRESERVATIVE FREE 10 ML: 5 INJECTION INTRAVENOUS at 11:11

## 2023-11-03 RX ADMIN — QUETIAPINE FUMARATE 400 MG: 100 TABLET ORAL at 08:11

## 2023-11-03 RX ADMIN — DIVALPROEX SODIUM 1000 MG: 500 TABLET, FILM COATED, EXTENDED RELEASE ORAL at 08:11

## 2023-11-03 RX ADMIN — HYDROCORTISONE 10 MG: 10 TABLET ORAL at 08:11

## 2023-11-03 RX ADMIN — FLUOXETINE 40 MG: 20 CAPSULE ORAL at 08:11

## 2023-11-03 RX ADMIN — SODIUM CHLORIDE, PRESERVATIVE FREE 10 ML: 5 INJECTION INTRAVENOUS at 12:11

## 2023-11-03 RX ADMIN — TAMSULOSIN HYDROCHLORIDE 0.4 MG: 0.4 CAPSULE ORAL at 08:11

## 2023-11-03 RX ADMIN — OXCARBAZEPINE 600 MG: 150 TABLET, FILM COATED ORAL at 08:11

## 2023-11-03 RX ADMIN — DIVALPROEX SODIUM 250 MG: 500 TABLET, FILM COATED, EXTENDED RELEASE ORAL at 08:11

## 2023-11-03 RX ADMIN — RISPERIDONE 0.5 MG: 0.5 TABLET ORAL at 08:11

## 2023-11-03 RX ADMIN — ATORVASTATIN CALCIUM 10 MG: 10 TABLET, FILM COATED ORAL at 08:11

## 2023-11-03 RX ADMIN — Medication 16 G: at 11:11

## 2023-11-03 RX ADMIN — POTASSIUM CHLORIDE 20 MEQ: 1500 TABLET, EXTENDED RELEASE ORAL at 08:11

## 2023-11-03 RX ADMIN — GABAPENTIN 300 MG: 300 CAPSULE ORAL at 08:11

## 2023-11-03 RX ADMIN — SODIUM CHLORIDE, PRESERVATIVE FREE 10 ML: 5 INJECTION INTRAVENOUS at 06:11

## 2023-11-03 RX ADMIN — DEXTROSE MONOHYDRATE 125 ML: 100 INJECTION, SOLUTION INTRAVENOUS at 11:11

## 2023-11-03 RX ADMIN — GABAPENTIN 300 MG: 300 CAPSULE ORAL at 02:11

## 2023-11-03 NOTE — TREATMENT PLAN
"Pt seen at bedside. He is awake and alert in good spirits watching " shows" and told me he's waiting on "a new home". He is on RA in NAD and has been tolerating diet well. No new recs from a pulm standpoint; we will s/o. Please call with questions.   "

## 2023-11-03 NOTE — PT/OT/SLP PROGRESS
Physical Therapy  Treatment    Juan Francisco Parker   MRN: [id number]   Admitting Diagnosis: Sepsis    PT Received On: 11/03/23  PT Start Time: 1230     PT Stop Time: 1300    PT Total Time (min): 30 min       Billable Minutes:  Gait Training 15 and Therapeutic Activity 15    Treatment Type: Treatment  PT/PTA: PTA     Number of PTA visits since last PT visit: 2       General Precautions: Standard, fall  Orthopedic Precautions: N/A  Braces: N/A  Respiratory Status: Room air    Spiritual, Cultural Beliefs, Shinto Practices, Values that Affect Care: no    Subjective:  Communicated with patient's nurse, Chelo, and completed Epic chart review prior to session.  Patient agreed to PT session.    Pain/Comfort  Pain Rating 1: 0/10  Pain Rating Post-Intervention 1: 0/10    Objective:   Patient found with: PICC line, telemetry, Other (comments) (AVASYS)    Patient found sitting up in chair.     STS from chair > RW: Min A (VC for hand placement)    300ft w/ RW Mod A (intermittent VC for safety w/ RW mgmt)    Attempted toilet transfer for transfer training purposes but patient was unable to lower himself to the toilet due to low height.   Stand pivot T/F to toilet w/ RW: Mod A (VC for safety w/ RW mgmt and sequencing of task)    8ft from toilet > chair w/ RW: Mod A     Stand pivot T/F to chair w/ RW: Mod A (VC for safety w/ RW mgmt and sequencing of task)    Reviewed AROM TE to BLE including: hip flex/ext, knee flex/ext, ankle PF/DF  To be completed a minimum of 10 reps for each LE in order to promote return of function, strength and ROM.      Educated patient on importance of increased tolerance to upright position and direct impact on CV endurance and strength. Patient encouraged to sit up in chair/ EOB, for a minimum of 2 consecutive hours, 3x per day. Encouraged patient to perform AROM TE to BLE throughout the day within all available planes of motion. Re enforced importance of utilizing call light to meet needs in room and not  attempt to get up without staff assistance. Patient verbalized understanding and agreed to comply.      AM-PAC 6 CLICK MOBILITY  How much help from another person does this patient currently need?   1 = Unable, Total/Dependent Assistance  2 = A lot, Maximum/Moderate Assistance  3 = A little, Minimum/Contact Guard/Supervision  4 = None, Modified [place]/Independent    Turning over in bed (including adjusting bedclothes, sheets and blankets)?: 1 (NT)  Sitting down on and standing up from a chair with arms (e.g., wheelchair, bedside commode, etc.): 3  Moving from lying on back to sitting on the side of the bed?: 1 (NT)  Moving to and from a bed to a chair (including a wheelchair)?: 2  Need to walk in hospital room?: 2  Climbing 3-5 steps with a railing?: 1 (NT)  Basic Mobility Total Score: 10    AM-PAC Raw Score CMS G-Code Modifier Level of Impairment Assistance   6 % Total / Unable   7 - 9 CM 80 - 100% Maximal Assist   10 - 14 CL 60 - 80% Moderate Assist   15 - 19 CK 40 - 60% Moderate Assist   20 - 22 CJ 20 - 40% Minimal Assist   23 CI 1-20% SBA / CGA   24 CH 0% Independent/ Mod I     Patient left up in chair with call button in reach, chair alarm on, and AVASYS present.    Assessment:  Juan Francisco Parker is a 58 y.o. male with a medical diagnosis of Sepsis and presents with overall decline in functional mobility. Patient would continue to benefit from skilled PT to address functional limitations listed below in order to return to PLOF/decrease caregiver burden.     Rehab identified problem list/impairments: weakness, impaired self care skills, impaired functional mobility, gait instability, impaired balance, impaired cognition, decreased coordination, decreased lower extremity function, decreased safety awareness, decreased ROM    Rehab potential is good.    Activity tolerance: Fair    Discharge recommendations: Moderate Intensity Therapy      Barriers to discharge:      Equipment recommendations: to be  determined by next level of care     GOALS:   Multidisciplinary Problems       Physical Therapy Goals          Problem: Physical Therapy    Goal Priority Disciplines Outcome Goal Variances Interventions   Physical Therapy Goal     PT, PT/OT Ongoing, Progressing     Description: Goals to be met by 11/10/23.  1. Pt will complete bed mobility MOD I.  2. Pt will complete sit to stand SBA.  3. Pt will transfer bed to chair MIN A.  4. Pt will increase AMPAC score by 2 points to progress functional mobility.                       PLAN:    Patient to be seen 3 x/week to address the above listed problems via gait training, therapeutic activities, therapeutic exercises  Plan of Care expires: 11/10/23  Plan of Care reviewed with: patient         11/03/2023

## 2023-11-03 NOTE — PLAN OF CARE
Patient not medically cleared for transfer to [facility].  Notified liaisonBrianna.       11/03/23 1136   Post-Acute Status   Post-Acute Authorization Placement   Post-Acute Placement Status Pending medical clearance/testing   Discharge Plan   Discharge Plan A Skilled Nursing Facility

## 2023-11-03 NOTE — PROGRESS NOTES
[facility]  Hospital Medicine  Progress Note    Patient Name: Juan Francisco Parker  MRN: [id number]  Patient Class: IP- Inpatient   Admission Date: 10/24/2023  Length of Stay: 10 days  Attending Physician: Ghanshyam Penn MD  Primary Care Provider: [facility] Primary Doctor        Subjective:     Principal Problem:Sepsis        HPI:  Juan Francisco Parker is a 58 y.o. male with a PMH  has a past medical history of Diabetes mellitus, type 2 (2004), Hyperlipidemia, Hypertension, Mental disability, Paranoid personality disorder, Peripheral neuropathy, and Unspecified intellectual disabilities. who presented to the ED to the ED via EMS for further evaluation of worsening altered mental status since this morning.  History mainly obtained through chart review, ED sign-out, and caregiver at bedside as patient has underlying intellectual disability and only able to provide minimal information.  Patient has history of recurrent UTIs and was complaining of belly pain over the past few days.  Caregiver unsure of patient's last bowel movement but has continued to eat pureed food and drink fluids.  Patient usually ambulates with aid of wheelchair but is able to stand and walk around but has been mostly bed-bound the past few days.  No known alleviating or aggravating factors with noted with all other review of systems negative except as stated above.  Initial workup in the ED revealed patient met SIRS criteria for sepsis with UA positive for UTI.  Patient initiated on sepsis protocol and continued on Rocephin with cultures obtained and pending.  Patient being admitted to Hospital Medicine inpatient for continued medical management.       PCP: Meaghan Primary Doctor        Overview/Hospital Course:  Mr Parker was initially admitted with severe sepsis requiring fluid resuscitation and a leela hugger.  With improvement in BP the fluids were stopped and with improvements in his temp the leela hugger has been weaned, requiring intermittent restarts.   On day 2 of hospitalization patient because lethargic  He appeared to be trying to cough up mucus but due to extreme weakness was not able to do so.  After aggressive suctioning he was able to have some mucus removed, but continued to have a weak cough. Pulmonology  was consulted.  The patient was started on 3% saline nebs to help with the congestion.  Chest xray Bi basilar low-grade infiltrates and/or atelectasis.  PT/OT/ST  - dysphagia diet and SNF. Urine culture Providencia rettgeri. Patient completed 7 day course of ceftriaxone. Hematology consulted due to pancytopenia. Labs ordered as requested and no significant deficiencies noted. His mental status improved. He ishaving intermittent hypoglycemia requiring D5 infusion and dextrose tablets.       Interval History: hypoglycemia intermittently, no symptoms.    Review of Systems  Objective:     Vital Signs (Most Recent):  Temp: 97.7 °F (36.5 °C) (11/03/23 1609)  Pulse: 82 (11/03/23 1609)  Resp: 19 (11/03/23 1609)  BP: (!) 93/48 (11/03/23 1609)  SpO2: 100 % (11/03/23 1609) Vital Signs (24h Range):  Temp:  [97.6 °F (36.4 °C)-98.6 °F (37 °C)] 97.7 °F (36.5 °C)  Pulse:  [70-90] 82  Resp:  [15-20] 19  SpO2:  [79 %-100 %] 100 %  BP: ()/(45-66) 93/48     Weight: 74.3 kg (163 lb 12.8 oz)  Body mass index is 21.61 kg/m².  No intake or output data in the 24 hours ending 11/03/23 1848      Physical Exam  HENT:      Head: Normocephalic and atraumatic.   Cardiovascular:      Rate and Rhythm: Normal rate and regular rhythm.      Heart sounds: No murmur heard.  Pulmonary:      Effort: Pulmonary effort is normal. No respiratory distress.      Breath sounds: Normal breath sounds. No wheezing.   Abdominal:      General: Bowel sounds are normal. There is no distension.      Palpations: Abdomen is soft.      Tenderness: There is no abdominal tenderness.   Musculoskeletal:         General: No swelling.   Skin:     General: Skin is warm and dry.   Neurological:      Mental  "Status: He is alert and oriented to person, place, and time. Mental status is at baseline.             Significant Labs: All pertinent labs within the past 24 hours have been reviewed.  CBC:   Recent Labs   Lab 11/02/23 0330 11/03/23  0446   WBC 2.59* 2.63*   HGB 7.9* 8.7*   HCT 23.8* 27.9*    258     CMP:   Recent Labs   Lab 11/02/23 0330 11/03/23  0446   * 136   K 4.1 4.8    101   CO2 26 28    88   BUN 16 13   CREATININE 0.5 0.6   CALCIUM 7.4* 8.1*   PROT 4.7* 5.3*   ALBUMIN 2.1* 2.4*   BILITOT 0.2 0.2   ALKPHOS 77 78   AST 15 17   ALT 21 19   ANIONGAP 6* 7*       Significant Imaging:       Assessment/Plan:      * Sepsis  This patient does have evidence of infective focus  My overall impression is sepsis.  Source: Urinary Tract  Antibiotics given-   Antibiotics (72h ago, onward)    None        Latest lactate reviewed-  No results for input(s): "LACTATE" in the last 72 hours.  Organ dysfunction indicated by Encephalopathy    Fluid challenge Ideal Body Weight- The patient's ideal body weight is Ideal body weight: 79.9 kg (176 lb 2.4 oz) which will be used to calculate fluid bolus of 30 ml/kg for treatment of septic shock.      Post- resuscitation assessment No - Post resuscitation assessment not needed     Will Not start Pressors- Levophed for MAP of 65  Source control achieved by: Rocephin         -resolved  -completed 7 day course of abx    Hypoglycemia  - currently on D5 NS  -requiring treatment with hypoglycemia protocol  -may have some degree of cortisol insuffiency  -started Cortef  -monitor      Pancytopenia  This patient is found to have pancytopenia, the likely etiology is mulitfactorial, will monitor CBC Daily. Will transfuse red blood cells if the hemoglobin is <7g/dL (or <8 in the setting of ACS). Will transfuse platelets if platelet count is <50k (if undergoing surgical procedure or have active bleeding). Hold DVT prophylaxis if platelets are <50k. The patient's hemoglobin, " white blood cell count, and platelet count results have been reviewed and are listed below.  Recent Labs   Lab 11/02/23  0330   HGB 7.9*   WBC 2.59*          - Hematology evaluated, labs ordered as recommended  -no significant deficiencies  -platelet count improving      Weak cough  - Appreciate pulmonary assistance  - 3% saline nebs  -dysphagia diet    History of DVT (deep vein thrombosis)  -continue Eliquis   -platelet count stable      Neuropathy  -continue gabapentin      Anxiety and depression  -Continue home medications       Benign prostatic hyperplasia  Currently on Flomax outpatient.  Plan:  -continue home medication      Hyperlipidemia  -continue statin      Controlled type 2 diabetes mellitus, without long-term current use of insulin  Patient's FSGs are uncontrolled due to hypoglycemia on current medication regimen.  Last A1c reviewed-   Lab Results   Component Value Date    HGBA1C 5.1 12/10/2021     Most recent fingerstick glucose reviewed-   Recent Labs   Lab 11/03/23  0624 11/03/23  1132 11/03/23  1257 11/03/23  1742   POCTGLUCOSE 66* 58* 70 167*     Current correctional scale  none  Maintain anti-hyperglycemic dose as follows-   Antihyperglycemics (From admission, onward)    None        Hold Oral hypoglycemics while patient is in the hospital.  -currently on D5        Primary hypertension  -not currently on medication as lasix discontinued  due to soft blood pressures  -monitor blood pressure      Intellectual disability  - from group home        VTE Risk Mitigation (From admission, onward)         Ordered     Place sequential compression device  Until discontinued         10/24/23 1907     IP VTE LOW RISK PATIENT  Once         10/24/23 1907                Discharge Planning   GEOFF:      Code Status: Full Code   Is the patient medically ready for discharge?:     Reason for patient still in hospital (select all that apply): Patient trending condition, Laboratory test and Treatment  Discharge Plan  A: Skilled Nursing Facility                  Ghanshyam Penn MD  Department of Hospital Medicine   [facility]

## 2023-11-03 NOTE — PLAN OF CARE
Isabella from [facility] in [place] was sent by [facility] ( for this patient).  His PCP Dr. Lilly requested he go [place] [facility] [place] as [place] rounds there and he has been his PCP for many years.  Spoke with Joyce to verify this information.  Asked for the patient's assigned guardian name and was told the patient is competent to make his own decisions and one had not been assigned.    Clinical [place] [facility] and [place].  They are able to transfer the patient over the weekend if he becomes medically stable.  Please call Isabella Acevedo @ [phone] if he is ready for transfer over the weekend.

## 2023-11-03 NOTE — ASSESSMENT & PLAN NOTE
Patient's FSGs are uncontrolled due to hypoglycemia on current medication regimen.  Last A1c reviewed-   Lab Results   Component Value Date    HGBA1C 5.1 12/10/2021     Most recent fingerstick glucose reviewed-   Recent Labs   Lab 11/03/23  0624 11/03/23  1132 11/03/23  1257 11/03/23  1742   POCTGLUCOSE 66* 58* 70 167*     Current correctional scale  none  Maintain anti-hyperglycemic dose as follows-   Antihyperglycemics (From admission, onward)    None        Hold Oral hypoglycemics while patient is in the hospital.  -currently on D5

## 2023-11-03 NOTE — PLAN OF CARE
PATIENT WITH DIFFICULTY ACHIEVING TOILET T/F DESPITE HAND SUPPORT FROM GRAB BAR DUE TO TOILET TOO LOW.  PATIENT WOULD BENEFIT FROM A HIGH TOILET OR COMMODE OVER TOILET TO DECREASE FALL RISK.

## 2023-11-03 NOTE — PT/OT/SLP PROGRESS
Occupational Therapy  Treatment    Juan Francisco Parker   MRN: [id number]   Admitting Diagnosis: Sepsis    OT Date of Treatment: 11/03/23   OT Start Time: 1230  OT Stop Time: 1254  OT Total Time (min): 24 min    Billable Minutes:  Therapeutic Activity 14 and Therapeutic Exercise 0   AND SELF CARE 10    OT/JELENA: OT          General Precautions: Standard, fall  Orthopedic Precautions: N/A  Braces: N/A  Respiratory Status: Room air         Subjective:  Communicated with NURSE prior to session.       Pain/Comfort  Pain Rating 1: 0/10  Pain Rating Post-Intervention 1: 0/10  Pain Rating 2: 0/10  Pain Rating Post-Intervention 2: 0/10    Objective:  Patient found with: PICC line, telemetry (AVASYS)     Functional Mobility:  Bed Mobility:  NT    Transfers:   PATIENT ATTEMPTED TOILET T/F WITHOUT SUCCESS DUE TO TOILET TOO LOW.  PATIENT WOULD BENEFIT FROM COMMODE OVER TOILET TO INCREASE HEIGHT OF SITTING SURFACE TO DECREASE FALL RISK.  PATIENT T/F'ED B/S <> STAND WITH MIN (A) WITH RW WITH MAX VC FOR PROPER HAND PLACEMENT BETWEEN CHAIR AND RW TO DECREASE FALL RISK.           Functional Ambulation: PATIENT AMBULATED 300 FEET WITH RW MOD (A).    Activities of Daily Living:     Feeding adaptive equipment:  NONE     UE adaptive equipment: NT     LE adaptive equipment: NT                    Bathing adaptive equipment: TBD    Balance:   Static Sit: FAIR-: Maintains without assist but inconsistent   Dynamic Sit: FAIR: Cannot move trunk without losing balance  Static Stand: POOR: Needs MODERATE assist to maintain  Dynamic stand: POOR: Needs MOD (moderate) assist during gait    Therapeutic Activities and Exercises:  PATIENT PRACTICED SAFETY WITH SIT <> STAND WITH B/S CHAIR AND ATTEMPTED TOILET T/F WITH USE OF GRAB BAR WITHOUT SUCCESS DUE TO TOILET TOO LOW.  PATIENT DEMO'ED (I) TO OPEN TWIST TOP ON HIS NUTRITION SHAKE CONTAINER.    AM-PAC 6 CLICK ADL   How much help from another person does this patient currently need?   1 = Unable, Total/Dependent  "Assistance  2 = A lot, Maximum/Moderate Assistance  3 = A little, Minimum/Contact Guard/Supervision  4 = None, Modified [place]/Independent    Putting on and taking off regular lower body clothing? : 1  Bathing (including washing, rinsing, drying)?: 2  Toileting, which includes using toilet, bedpan, or urinal? : 2  Putting on and taking off regular upper body clothing?: 2  Taking care of personal grooming such as brushing teeth?: 3  Eating meals?: 4  Daily Activity Total Score: 14     AM-PAC Raw Score CMS "G-Code Modifier Level of Impairment Assistance   6 % Total / Unable   7 - 8 CM 80 - 100% Maximal Assist   9-13 CL 60 - 80% Moderate Assist   14 - 19 CK 40 - 60% Moderate Assist   20 - 22 CJ 20 - 40% Minimal Assist   23 CI 1-20% SBA / CGA   24 CH 0% Independent/ Mod I       Patient left up in chair with all lines intact and call button in reach NURSE AWARE.    ASSESSMENT:  Juan Francisco Parker is a 58 y.o. male with a medical diagnosis of Sepsis and presents with SELF CARE DEBILITY .    Rehab identified problem list/impairments:  weakness, impaired endurance, impaired self care skills, impaired functional mobility, gait instability, impaired balance, impaired cognition, decreased coordination, decreased upper extremity function, decreased lower extremity function, decreased safety awareness    Rehab potential is good.    Activity tolerance: Good    Discharge recommendations: Moderate Intensity Therapy   Barriers to discharge: Barriers to Discharge: None    Equipment recommendations: to be determined by next level of care    GOALS:   Multidisciplinary Problems       Occupational Therapy Goals          Problem: Occupational Therapy    Goal Priority Disciplines Outcome Interventions   Occupational Therapy Goal     OT, PT/OT Ongoing, Progressing    Description: Goals to be met by: 11/10/23     Patient will increase functional independence with ADLs by performing:    UE Dressing with Mod Assistance.  Supine to sit " with Mod Assistance.  Increased functional strength to BUE grossly by 1/2 MMT grades.                         Plan:  Patient to be seen 2 x/week to address the above listed problems via therapeutic exercises, self-care/home management, therapeutic activities  Plan of Care expires: 11/10/23  Plan of Care reviewed with: patient         11/03/2023

## 2023-11-03 NOTE — SUBJECTIVE & OBJECTIVE
Interval History: hypoglycemia intermittently, no symptoms.    Review of Systems  Objective:     Vital Signs (Most Recent):  Temp: 97.7 °F (36.5 °C) (11/03/23 1609)  Pulse: 82 (11/03/23 1609)  Resp: 19 (11/03/23 1609)  BP: (!) 93/48 (11/03/23 1609)  SpO2: 100 % (11/03/23 1609) Vital Signs (24h Range):  Temp:  [97.6 °F (36.4 °C)-98.6 °F (37 °C)] 97.7 °F (36.5 °C)  Pulse:  [70-90] 82  Resp:  [15-20] 19  SpO2:  [79 %-100 %] 100 %  BP: ()/(45-66) 93/48     Weight: 74.3 kg (163 lb 12.8 oz)  Body mass index is 21.61 kg/m².  No intake or output data in the 24 hours ending 11/03/23 1848      Physical Exam  HENT:      Head: Normocephalic and atraumatic.   Cardiovascular:      Rate and Rhythm: Normal rate and regular rhythm.      Heart sounds: No murmur heard.  Pulmonary:      Effort: Pulmonary effort is normal. No respiratory distress.      Breath sounds: Normal breath sounds. No wheezing.   Abdominal:      General: Bowel sounds are normal. There is no distension.      Palpations: Abdomen is soft.      Tenderness: There is no abdominal tenderness.   Musculoskeletal:         General: No swelling.   Skin:     General: Skin is warm and dry.   Neurological:      Mental Status: He is alert and oriented to person, place, and time. Mental status is at baseline.             Significant Labs: All pertinent labs within the past 24 hours have been reviewed.  CBC:   Recent Labs   Lab 11/02/23 0330 11/03/23 0446   WBC 2.59* 2.63*   HGB 7.9* 8.7*   HCT 23.8* 27.9*    258     CMP:   Recent Labs   Lab 11/02/23 0330 11/03/23 0446   * 136   K 4.1 4.8    101   CO2 26 28    88   BUN 16 13   CREATININE 0.5 0.6   CALCIUM 7.4* 8.1*   PROT 4.7* 5.3*   ALBUMIN 2.1* 2.4*   BILITOT 0.2 0.2   ALKPHOS 77 78   AST 15 17   ALT 21 19   ANIONGAP 6* 7*       Significant Imaging:

## 2023-11-03 NOTE — ASSESSMENT & PLAN NOTE
- currently on D5 NS  -requiring treatment with hypoglycemia protocol  -may have some degree of cortisol insuffiency  -started Cortef  -monitor

## 2023-11-04 LAB
ALBUMIN SERPL BCP-MCNC: 2.4 G/DL (ref 3.5–5.2)
ALP SERPL-CCNC: 85 U/L (ref 55–135)
ALT SERPL W/O P-5'-P-CCNC: 21 U/L (ref 10–44)
ANION GAP SERPL CALC-SCNC: 9 MMOL/L (ref 8–16)
AST SERPL-CCNC: 15 U/L (ref 10–40)
BASOPHILS # BLD AUTO: 0.02 K/UL (ref 0–0.2)
BASOPHILS NFR BLD: 0.8 % (ref 0–1.9)
BILIRUB SERPL-MCNC: 0.2 MG/DL (ref 0.1–1)
BUN SERPL-MCNC: 12 MG/DL (ref 6–20)
CALCIUM SERPL-MCNC: 7.8 MG/DL (ref 8.7–10.5)
CHLORIDE SERPL-SCNC: 100 MMOL/L (ref 95–110)
CO2 SERPL-SCNC: 26 MMOL/L (ref 23–29)
CREAT SERPL-MCNC: 0.5 MG/DL (ref 0.5–1.4)
DIFFERENTIAL METHOD: ABNORMAL
EOSINOPHIL # BLD AUTO: 0.1 K/UL (ref 0–0.5)
EOSINOPHIL NFR BLD: 2.4 % (ref 0–8)
ERYTHROCYTE [DISTWIDTH] IN BLOOD BY AUTOMATED COUNT: 17 % (ref 11.5–14.5)
EST. GFR  (NO RACE VARIABLE): >60 ML/MIN/1.73 M^2
GLUCOSE SERPL-MCNC: 93 MG/DL (ref 70–110)
HCT VFR BLD AUTO: 27 % (ref 40–54)
HGB BLD-MCNC: 8.8 G/DL (ref 14–18)
IMM GRANULOCYTES # BLD AUTO: 0.03 K/UL (ref 0–0.04)
IMM GRANULOCYTES NFR BLD AUTO: 1.2 % (ref 0–0.5)
LYMPHOCYTES # BLD AUTO: 0.7 K/UL (ref 1–4.8)
LYMPHOCYTES NFR BLD: 25.6 % (ref 18–48)
MAGNESIUM SERPL-MCNC: 1.9 MG/DL (ref 1.6–2.6)
MCH RBC QN AUTO: 33.1 PG (ref 27–31)
MCHC RBC AUTO-ENTMCNC: 32.6 G/DL (ref 32–36)
MCV RBC AUTO: 102 FL (ref 82–98)
METHYLMALONATE SERPL-SCNC: 0.13 UMOL/L
MONOCYTES # BLD AUTO: 0.4 K/UL (ref 0.3–1)
MONOCYTES NFR BLD: 17.3 % (ref 4–15)
NEUTROPHILS # BLD AUTO: 1.3 K/UL (ref 1.8–7.7)
NEUTROPHILS NFR BLD: 52.7 % (ref 38–73)
NRBC BLD-RTO: 0 /100 WBC
PHOSPHATE SERPL-MCNC: 3.8 MG/DL (ref 2.7–4.5)
PLATELET # BLD AUTO: 270 K/UL (ref 150–450)
PMV BLD AUTO: 9.6 FL (ref 9.2–12.9)
POCT GLUCOSE: 101 MG/DL (ref 70–110)
POCT GLUCOSE: 74 MG/DL (ref 70–110)
POCT GLUCOSE: 80 MG/DL (ref 70–110)
POCT GLUCOSE: 89 MG/DL (ref 70–110)
POTASSIUM SERPL-SCNC: 4.4 MMOL/L (ref 3.5–5.1)
PROT SERPL-MCNC: 5.2 G/DL (ref 6–8.4)
RBC # BLD AUTO: 2.66 M/UL (ref 4.6–6.2)
SODIUM SERPL-SCNC: 135 MMOL/L (ref 136–145)
WBC # BLD AUTO: 2.54 K/UL (ref 3.9–12.7)
ZINC SERPL-MCNC: 73 UG/DL (ref 60–130)

## 2023-11-04 PROCEDURE — 83735 ASSAY OF MAGNESIUM: CPT | Performed by: HOSPITALIST

## 2023-11-04 PROCEDURE — 85025 COMPLETE CBC W/AUTO DIFF WBC: CPT | Performed by: HOSPITALIST

## 2023-11-04 PROCEDURE — 63600175 PHARM REV CODE 636 W HCPCS: Performed by: INTERNAL MEDICINE

## 2023-11-04 PROCEDURE — 25000003 PHARM REV CODE 250: Performed by: HOSPITALIST

## 2023-11-04 PROCEDURE — 11000001 HC ACUTE MED/SURG PRIVATE ROOM

## 2023-11-04 PROCEDURE — 80053 COMPREHEN METABOLIC PANEL: CPT | Performed by: HOSPITALIST

## 2023-11-04 PROCEDURE — 25000003 PHARM REV CODE 250: Performed by: INTERNAL MEDICINE

## 2023-11-04 PROCEDURE — 36415 COLL VENOUS BLD VENIPUNCTURE: CPT | Performed by: HOSPITALIST

## 2023-11-04 PROCEDURE — 84100 ASSAY OF PHOSPHORUS: CPT | Performed by: HOSPITALIST

## 2023-11-04 RX ADMIN — RISPERIDONE 0.5 MG: 0.5 TABLET ORAL at 08:11

## 2023-11-04 RX ADMIN — DIVALPROEX SODIUM 250 MG: 500 TABLET, FILM COATED, EXTENDED RELEASE ORAL at 08:11

## 2023-11-04 RX ADMIN — RISPERIDONE 0.5 MG: 0.5 TABLET ORAL at 09:11

## 2023-11-04 RX ADMIN — GABAPENTIN 300 MG: 300 CAPSULE ORAL at 02:11

## 2023-11-04 RX ADMIN — QUETIAPINE FUMARATE 400 MG: 100 TABLET ORAL at 09:11

## 2023-11-04 RX ADMIN — GABAPENTIN 300 MG: 300 CAPSULE ORAL at 08:11

## 2023-11-04 RX ADMIN — POTASSIUM CHLORIDE 20 MEQ: 1500 TABLET, EXTENDED RELEASE ORAL at 08:11

## 2023-11-04 RX ADMIN — HYDROCORTISONE 10 MG: 10 TABLET ORAL at 08:11

## 2023-11-04 RX ADMIN — OXCARBAZEPINE 600 MG: 150 TABLET, FILM COATED ORAL at 10:11

## 2023-11-04 RX ADMIN — POTASSIUM CHLORIDE 20 MEQ: 1500 TABLET, EXTENDED RELEASE ORAL at 09:11

## 2023-11-04 RX ADMIN — OXCARBAZEPINE 600 MG: 150 TABLET, FILM COATED ORAL at 09:11

## 2023-11-04 RX ADMIN — ATORVASTATIN CALCIUM 10 MG: 10 TABLET, FILM COATED ORAL at 08:11

## 2023-11-04 RX ADMIN — GABAPENTIN 300 MG: 300 CAPSULE ORAL at 09:11

## 2023-11-04 RX ADMIN — DEXTROSE AND SODIUM CHLORIDE: 5; 900 INJECTION, SOLUTION INTRAVENOUS at 12:11

## 2023-11-04 RX ADMIN — TAMSULOSIN HYDROCHLORIDE 0.4 MG: 0.4 CAPSULE ORAL at 08:11

## 2023-11-04 RX ADMIN — HYDROCORTISONE 5 MG: 5 TABLET ORAL at 09:11

## 2023-11-04 RX ADMIN — DIVALPROEX SODIUM 1000 MG: 500 TABLET, FILM COATED, EXTENDED RELEASE ORAL at 09:11

## 2023-11-04 RX ADMIN — FLUOXETINE 40 MG: 20 CAPSULE ORAL at 08:11

## 2023-11-04 NOTE — SUBJECTIVE & OBJECTIVE
Interval History: added Alexisef yesterday, asking for lemon pie    Review of Systems  Objective:     Vital Signs (Most Recent):  Temp: 98.3 °F (36.8 °C) (11/04/23 1629)  Pulse: 76 (11/04/23 1629)  Resp: 20 (11/04/23 1629)  BP: 115/71 (11/04/23 1629)  SpO2: (!) 94 % (11/04/23 1629) Vital Signs (24h Range):  Temp:  [97.3 °F (36.3 °C)-98.3 °F (36.8 °C)] 98.3 °F (36.8 °C)  Pulse:  [68-82] 76  Resp:  [18-20] 20  SpO2:  [93 %-99 %] 94 %  BP: ()/(42-71) 115/71     Weight: 82.2 kg (181 lb 3.5 oz)  Body mass index is 23.91 kg/m².    Intake/Output Summary (Last 24 hours) at 11/4/2023 1655  Last data filed at 11/4/2023 0925  Gross per 24 hour   Intake 1840 ml   Output --   Net 1840 ml         Physical Exam  HENT:      Head: Normocephalic and atraumatic.   Cardiovascular:      Rate and Rhythm: Normal rate and regular rhythm.      Heart sounds: No murmur heard.  Pulmonary:      Effort: Pulmonary effort is normal. No respiratory distress.      Breath sounds: Normal breath sounds. No wheezing.   Abdominal:      General: Bowel sounds are normal. There is no distension.      Palpations: Abdomen is soft.      Tenderness: There is no abdominal tenderness.   Musculoskeletal:         General: No swelling.   Skin:     General: Skin is warm and dry.   Neurological:      Mental Status: He is alert and oriented to person, place, and time. Mental status is at baseline.             Significant Labs: All pertinent labs within the past 24 hours have been reviewed.  CBC:   Recent Labs   Lab 11/03/23 0446 11/04/23 0402   WBC 2.63* 2.54*   HGB 8.7* 8.8*   HCT 27.9* 27.0*    270     CMP:   Recent Labs   Lab 11/03/23 0446 11/04/23 0402    135*   K 4.8 4.4    100   CO2 28 26   GLU 88 93   BUN 13 12   CREATININE 0.6 0.5   CALCIUM 8.1* 7.8*   PROT 5.3* 5.2*   ALBUMIN 2.4* 2.4*   BILITOT 0.2 0.2   ALKPHOS 78 85   AST 17 15   ALT 19 21   ANIONGAP 7* 9       Significant Imaging: I have reviewed all pertinent imaging  results/findings within the past 24 hours.

## 2023-11-04 NOTE — PLAN OF CARE
Problem: Adult Inpatient Plan of Care  Goal: Plan of Care Review  Outcome: Ongoing, Progressing  Flowsheets (Taken 11/4/2023 1554)  Plan of Care Reviewed With: patient     Problem: Adult Inpatient Plan of Care  Goal: Optimal Comfort and Wellbeing  Outcome: Ongoing, Progressing  Intervention: Provide Person-Centered Care  Flowsheets (Taken 11/4/2023 1554)  Trust Relationship/Rapport:   care explained   choices provided   emotional support provided   empathic listening provided   questions answered   questions encouraged   reassurance provided   thoughts/feelings acknowledged

## 2023-11-04 NOTE — NURSING
Received sitting in bed awake and alert, resp even and unlabored. Denies any pain or discomfort at this time. Assessment per flowsheet. Plan of care, reinforcement needed. Bed low, call light within raech. Will continue to monitor.

## 2023-11-04 NOTE — PROGRESS NOTES
[facility]  Hospital Medicine  Progress Note    Patient Name: Juan Francisco Parker  MRN: [id number]  Patient Class: IP- Inpatient   Admission Date: 10/24/2023  Length of Stay: 11 days  Attending Physician: Ghanshyam Penn MD  Primary Care Provider: Meaghan Primary Doctor        Subjective:     Principal Problem:Sepsis        HPI:  Juan Francisco Parker is a 58 y.o. male with a PMH  has a past medical history of Diabetes mellitus, type 2 (2004), Hyperlipidemia, Hypertension, Mental disability, Paranoid personality disorder, Peripheral neuropathy, and Unspecified intellectual disabilities. who presented to the ED to the ED via EMS for further evaluation of worsening altered mental status since this morning.  History mainly obtained through chart review, ED sign-out, and caregiver at bedside as patient has underlying intellectual disability and only able to provide minimal information.  Patient has history of recurrent UTIs and was complaining of belly pain over the past few days.  Caregiver unsure of patient's last bowel movement but has continued to eat pureed food and drink fluids.  Patient usually ambulates with aid of wheelchair but is able to stand and walk around but has been mostly bed-bound the past few days.  No known alleviating or aggravating factors with noted with all other review of systems negative except as stated above.  Initial workup in the ED revealed patient met SIRS criteria for sepsis with UA positive for UTI.  Patient initiated on sepsis protocol and continued on Rocephin with cultures obtained and pending.  Patient being admitted to Hospital Medicine inpatient for continued medical management.       PCP: Meaghan Primary Doctor        Overview/Hospital Course:  Mr Parker was initially admitted with severe sepsis requiring fluid resuscitation and a leela hugger.  With improvement in BP the fluids were stopped and with improvements in his temp the leela hugger has been weaned, requiring intermittent restarts.   On day 2 of hospitalization patient because lethargic  He appeared to be trying to cough up mucus but due to extreme weakness was not able to do so.  After aggressive suctioning he was able to have some mucus removed, but continued to have a weak cough. Pulmonology  was consulted.  The patient was started on 3% saline nebs to help with the congestion.  Chest xray Bi basilar low-grade infiltrates and/or atelectasis.  PT/OT/ST  - dysphagia diet and SNF. Urine culture Providencia rettgeri. Patient completed 7 day course of ceftriaxone. Hematology consulted due to pancytopenia. Labs ordered as requested and no significant deficiencies noted. His mental status improved. He is having intermittent hypoglycemia requiring D5 infusion and dextrose tablets.       Interval History: added Cortef yesterday, asking for lemon pie    Review of Systems  Objective:     Vital Signs (Most Recent):  Temp: 98.3 °F (36.8 °C) (11/04/23 1629)  Pulse: 76 (11/04/23 1629)  Resp: 20 (11/04/23 1629)  BP: 115/71 (11/04/23 1629)  SpO2: (!) 94 % (11/04/23 1629) Vital Signs (24h Range):  Temp:  [97.3 °F (36.3 °C)-98.3 °F (36.8 °C)] 98.3 °F (36.8 °C)  Pulse:  [68-82] 76  Resp:  [18-20] 20  SpO2:  [93 %-99 %] 94 %  BP: ()/(42-71) 115/71     Weight: 82.2 kg (181 lb 3.5 oz)  Body mass index is 23.91 kg/m².    Intake/Output Summary (Last 24 hours) at 11/4/2023 1655  Last data filed at 11/4/2023 0925  Gross per 24 hour   Intake 1840 ml   Output --   Net 1840 ml         Physical Exam  HENT:      Head: Normocephalic and atraumatic.   Cardiovascular:      Rate and Rhythm: Normal rate and regular rhythm.      Heart sounds: No murmur heard.  Pulmonary:      Effort: Pulmonary effort is normal. No respiratory distress.      Breath sounds: Normal breath sounds. No wheezing.   Abdominal:      General: Bowel sounds are normal. There is no distension.      Palpations: Abdomen is soft.      Tenderness: There is no abdominal tenderness.   Musculoskeletal:         " General: No swelling.   Skin:     General: Skin is warm and dry.   Neurological:      Mental Status: He is alert and oriented to person, place, and time. Mental status is at baseline.             Significant Labs: All pertinent labs within the past 24 hours have been reviewed.  CBC:   Recent Labs   Lab 11/03/23 0446 11/04/23  0402   WBC 2.63* 2.54*   HGB 8.7* 8.8*   HCT 27.9* 27.0*    270     CMP:   Recent Labs   Lab 11/03/23 0446 11/04/23  0402    135*   K 4.8 4.4    100   CO2 28 26   GLU 88 93   BUN 13 12   CREATININE 0.6 0.5   CALCIUM 8.1* 7.8*   PROT 5.3* 5.2*   ALBUMIN 2.4* 2.4*   BILITOT 0.2 0.2   ALKPHOS 78 85   AST 17 15   ALT 19 21   ANIONGAP 7* 9       Significant Imaging: I have reviewed all pertinent imaging results/findings within the past 24 hours.      Assessment/Plan:      * Sepsis  This patient does have evidence of infective focus  My overall impression is sepsis.  Source: Urinary Tract  Antibiotics given-   Antibiotics (72h ago, onward)    None        Latest lactate reviewed-  No results for input(s): "LACTATE" in the last 72 hours.  Organ dysfunction indicated by Encephalopathy    Fluid challenge Ideal Body Weight- The patient's ideal body weight is Ideal body weight: 79.9 kg (176 lb 2.4 oz) which will be used to calculate fluid bolus of 30 ml/kg for treatment of septic shock.      Post- resuscitation assessment No - Post resuscitation assessment not needed     Will Not start Pressors- Levophed for MAP of 65  Source control achieved by: Rocephin         -resolved  -completed 7 day course of abx    Hypoglycemia  - currently on D5 NS, decreased rate   -requiring treatment with hypoglycemia protocol  -may have some degree of cortisol insuffiency  Continue Cortef  -monitor      Pancytopenia  This patient is found to have pancytopenia, the likely etiology is mulitfactorial, will monitor CBC Daily. Will transfuse red blood cells if the hemoglobin is <7g/dL (or <8 in the setting of " ACS). Will transfuse platelets if platelet count is <50k (if undergoing surgical procedure or have active bleeding). Hold DVT prophylaxis if platelets are <50k. The patient's hemoglobin, white blood cell count, and platelet count results have been reviewed and are listed below.  Recent Labs   Lab 11/04/23  0402   HGB 8.8*   WBC 2.54*          - Hematology evaluated, labs ordered as recommended  -no significant deficiencies  -platelet count improving      Weak cough  - Appreciate pulmonary assistance  - 3% saline nebs  -dysphagia diet    History of DVT (deep vein thrombosis)  -continue Eliquis   -platelet count stable      Neuropathy  -continue gabapentin      Anxiety and depression  -Continue home medications       Benign prostatic hyperplasia  Currently on Flomax outpatient.  Plan:  -continue home medication      Hyperlipidemia  -continue statin      Controlled type 2 diabetes mellitus, without long-term current use of insulin  Patient's FSGs are uncontrolled due to hypoglycemia on current medication regimen.  Last A1c reviewed-   Lab Results   Component Value Date    HGBA1C 5.1 12/10/2021     Most recent fingerstick glucose reviewed-   Recent Labs   Lab 11/03/23  1954 11/04/23  0605 11/04/23  1204 11/04/23  1630   POCTGLUCOSE 109 80 74 89     Current correctional scale  none  Maintain anti-hyperglycemic dose as follows-   Antihyperglycemics (From admission, onward)    None        Hold Oral hypoglycemics while patient is in the hospital.  -currently on D5        Primary hypertension  -not currently on medication as lasix discontinued  due to soft blood pressures  -monitor blood pressure      Intellectual disability  - from group home      VTE Risk Mitigation (From admission, onward)         Ordered     Place sequential compression device  Until discontinued         10/24/23 1907     IP VTE LOW RISK PATIENT  Once         10/24/23 1907                Discharge Planning   GEOFF:      Code Status: Full Code   Is  the patient medically ready for discharge?:     Reason for patient still in hospital (select all that apply): Patient trending condition, Laboratory test and Treatment  Discharge Plan A: Long-term acute care facility (LTAC)   Discharge Delays: (!) Ambulance Transport/Facility Transport              Ghanshyam Penn MD  Department of Hospital Medicine   [facility]

## 2023-11-04 NOTE — PLAN OF CARE
[facility]  Discharge Reassessment    Primary Care Provider: No, Primary Doctor    Expected Discharge Date:     Reassessment (most recent)       Discharge Reassessment - 11/04/23 1013          Discharge Reassessment    Assessment Type Discharge Planning Assessment (P)      Did the patient's condition or plan change since previous assessment? No (P)      Communicated GEOFF with patient/caregiver Date not available/Unable to determine (P)      Discharge Plan A Long-term acute care facility (LTAC) (P)      DME Needed Upon Discharge  none (P)      Transition of Care Barriers None (P)      Why the patient remains in the hospital Requires continued medical care;Placement issues (P)         Post-Acute Status    Post-Acute Authorization Placement (P)    [facility]    Post-Acute Placement Status Pending medical clearance/testing (P)      Coverage MEDICARE PART A & B (P)      Discharge Delays Ambulance Transport/Facility Transport (P)                    Ketty Chinchilla LMSW 11/4/2023 10:16 AM

## 2023-11-04 NOTE — ASSESSMENT & PLAN NOTE
- currently on D5 NS, decreased rate   -requiring treatment with hypoglycemia protocol  -may have some degree of cortisol insuffiency  Continue Cortef  -monitor

## 2023-11-04 NOTE — ASSESSMENT & PLAN NOTE
This patient is found to have pancytopenia, the likely etiology is mulitfactorial, will monitor CBC Daily. Will transfuse red blood cells if the hemoglobin is <7g/dL (or <8 in the setting of ACS). Will transfuse platelets if platelet count is <50k (if undergoing surgical procedure or have active bleeding). Hold DVT prophylaxis if platelets are <50k. The patient's hemoglobin, white blood cell count, and platelet count results have been reviewed and are listed below.  Recent Labs   Lab 11/04/23  0402   HGB 8.8*   WBC 2.54*          - Hematology evaluated, labs ordered as recommended  -no significant deficiencies  -platelet count improving

## 2023-11-04 NOTE — ASSESSMENT & PLAN NOTE
Patient's FSGs are uncontrolled due to hypoglycemia on current medication regimen.  Last A1c reviewed-   Lab Results   Component Value Date    HGBA1C 5.1 12/10/2021     Most recent fingerstick glucose reviewed-   Recent Labs   Lab 11/03/23  1954 11/04/23  0605 11/04/23  1204 11/04/23  1630   POCTGLUCOSE 109 80 74 89     Current correctional scale  none  Maintain anti-hyperglycemic dose as follows-   Antihyperglycemics (From admission, onward)    None        Hold Oral hypoglycemics while patient is in the hospital.  -currently on D5

## 2023-11-05 VITALS
OXYGEN SATURATION: 94 % | SYSTOLIC BLOOD PRESSURE: 102 MMHG | TEMPERATURE: 98 F | RESPIRATION RATE: 20 BRPM | HEIGHT: 73 IN | DIASTOLIC BLOOD PRESSURE: 50 MMHG | BODY MASS INDEX: 24.01 KG/M2 | WEIGHT: 181.19 LBS | HEART RATE: 70 BPM

## 2023-11-05 LAB
ALBUMIN SERPL BCP-MCNC: 2.4 G/DL (ref 3.5–5.2)
ALP SERPL-CCNC: 88 U/L (ref 55–135)
ALT SERPL W/O P-5'-P-CCNC: 21 U/L (ref 10–44)
ANION GAP SERPL CALC-SCNC: 7 MMOL/L (ref 8–16)
AST SERPL-CCNC: 14 U/L (ref 10–40)
BASOPHILS # BLD AUTO: 0.06 K/UL (ref 0–0.2)
BASOPHILS NFR BLD: 2.3 % (ref 0–1.9)
BILIRUB SERPL-MCNC: 0.2 MG/DL (ref 0.1–1)
BUN SERPL-MCNC: 11 MG/DL (ref 6–20)
CALCIUM SERPL-MCNC: 7.6 MG/DL (ref 8.7–10.5)
CHLORIDE SERPL-SCNC: 102 MMOL/L (ref 95–110)
CO2 SERPL-SCNC: 24 MMOL/L (ref 23–29)
CREAT SERPL-MCNC: 0.6 MG/DL (ref 0.5–1.4)
DIFFERENTIAL METHOD: ABNORMAL
EOSINOPHIL # BLD AUTO: 0 K/UL (ref 0–0.5)
EOSINOPHIL NFR BLD: 1.5 % (ref 0–8)
ERYTHROCYTE [DISTWIDTH] IN BLOOD BY AUTOMATED COUNT: 17.8 % (ref 11.5–14.5)
EST. GFR  (NO RACE VARIABLE): >60 ML/MIN/1.73 M^2
GLUCOSE SERPL-MCNC: 84 MG/DL (ref 70–110)
HCT VFR BLD AUTO: 29.5 % (ref 40–54)
HGB BLD-MCNC: 9.7 G/DL (ref 14–18)
IMM GRANULOCYTES # BLD AUTO: 0.04 K/UL (ref 0–0.04)
IMM GRANULOCYTES NFR BLD AUTO: 1.5 % (ref 0–0.5)
LYMPHOCYTES # BLD AUTO: 0.6 K/UL (ref 1–4.8)
LYMPHOCYTES NFR BLD: 21.5 % (ref 18–48)
MAGNESIUM SERPL-MCNC: 1.9 MG/DL (ref 1.6–2.6)
MCH RBC QN AUTO: 33.6 PG (ref 27–31)
MCHC RBC AUTO-ENTMCNC: 32.9 G/DL (ref 32–36)
MCV RBC AUTO: 102 FL (ref 82–98)
MONOCYTES # BLD AUTO: 0.5 K/UL (ref 0.3–1)
MONOCYTES NFR BLD: 18.5 % (ref 4–15)
NEUTROPHILS # BLD AUTO: 1.5 K/UL (ref 1.8–7.7)
NEUTROPHILS NFR BLD: 54.7 % (ref 38–73)
NRBC BLD-RTO: 0 /100 WBC
PHOSPHATE SERPL-MCNC: 3.6 MG/DL (ref 2.7–4.5)
PLATELET # BLD AUTO: 262 K/UL (ref 150–450)
PMV BLD AUTO: 11.1 FL (ref 9.2–12.9)
POCT GLUCOSE: 74 MG/DL (ref 70–110)
POCT GLUCOSE: 92 MG/DL (ref 70–110)
POTASSIUM SERPL-SCNC: 4.5 MMOL/L (ref 3.5–5.1)
PROT SERPL-MCNC: 5.3 G/DL (ref 6–8.4)
RBC # BLD AUTO: 2.89 M/UL (ref 4.6–6.2)
SODIUM SERPL-SCNC: 133 MMOL/L (ref 136–145)
WBC # BLD AUTO: 2.65 K/UL (ref 3.9–12.7)

## 2023-11-05 PROCEDURE — 90686 IIV4 VACC NO PRSV 0.5 ML IM: CPT | Performed by: INTERNAL MEDICINE

## 2023-11-05 PROCEDURE — 25000003 PHARM REV CODE 250: Performed by: INTERNAL MEDICINE

## 2023-11-05 PROCEDURE — 97116 GAIT TRAINING THERAPY: CPT | Mod: CQ

## 2023-11-05 PROCEDURE — 85025 COMPLETE CBC W/AUTO DIFF WBC: CPT | Performed by: HOSPITALIST

## 2023-11-05 PROCEDURE — 83735 ASSAY OF MAGNESIUM: CPT | Performed by: INTERNAL MEDICINE

## 2023-11-05 PROCEDURE — 80053 COMPREHEN METABOLIC PANEL: CPT | Performed by: INTERNAL MEDICINE

## 2023-11-05 PROCEDURE — G0008 ADMIN INFLUENZA VIRUS VAC: HCPCS | Performed by: INTERNAL MEDICINE

## 2023-11-05 PROCEDURE — 90471 IMMUNIZATION ADMIN: CPT | Performed by: INTERNAL MEDICINE

## 2023-11-05 PROCEDURE — 63600175 PHARM REV CODE 636 W HCPCS: Performed by: INTERNAL MEDICINE

## 2023-11-05 PROCEDURE — 84100 ASSAY OF PHOSPHORUS: CPT | Performed by: INTERNAL MEDICINE

## 2023-11-05 PROCEDURE — 36415 COLL VENOUS BLD VENIPUNCTURE: CPT | Performed by: HOSPITALIST

## 2023-11-05 PROCEDURE — 25000003 PHARM REV CODE 250: Performed by: HOSPITALIST

## 2023-11-05 PROCEDURE — 36415 COLL VENOUS BLD VENIPUNCTURE: CPT | Performed by: INTERNAL MEDICINE

## 2023-11-05 PROCEDURE — 97530 THERAPEUTIC ACTIVITIES: CPT | Mod: CQ

## 2023-11-05 RX ORDER — HYDROCORTISONE 5 MG/1
5 TABLET ORAL NIGHTLY
Refills: 0
Start: 2023-11-05

## 2023-11-05 RX ORDER — HYDROCORTISONE 10 MG/1
10 TABLET ORAL DAILY
Refills: 0
Start: 2023-11-06

## 2023-11-05 RX ADMIN — FLUOXETINE 40 MG: 20 CAPSULE ORAL at 08:11

## 2023-11-05 RX ADMIN — RISPERIDONE 0.5 MG: 0.5 TABLET ORAL at 08:11

## 2023-11-05 RX ADMIN — TAMSULOSIN HYDROCHLORIDE 0.4 MG: 0.4 CAPSULE ORAL at 08:11

## 2023-11-05 RX ADMIN — INFLUENZA VIRUS VACCINE 0.5 ML: 15; 15; 15; 15 SUSPENSION INTRAMUSCULAR at 10:11

## 2023-11-05 RX ADMIN — HYDROCORTISONE 10 MG: 10 TABLET ORAL at 08:11

## 2023-11-05 RX ADMIN — DIVALPROEX SODIUM 250 MG: 500 TABLET, FILM COATED, EXTENDED RELEASE ORAL at 08:11

## 2023-11-05 RX ADMIN — POTASSIUM CHLORIDE 20 MEQ: 1500 TABLET, EXTENDED RELEASE ORAL at 08:11

## 2023-11-05 RX ADMIN — ATORVASTATIN CALCIUM 10 MG: 10 TABLET, FILM COATED ORAL at 08:11

## 2023-11-05 RX ADMIN — OXCARBAZEPINE 600 MG: 150 TABLET, FILM COATED ORAL at 08:11

## 2023-11-05 RX ADMIN — GABAPENTIN 300 MG: 300 CAPSULE ORAL at 08:11

## 2023-11-05 NOTE — PLAN OF CARE
[facility]  Discharge Final Note    Primary Care Provider: No, Primary Doctor    Expected Discharge Date: 11/5/2023    Final Discharge Note (most recent)       Final Note - 11/05/23 0944          Final Note    Assessment Type Final Discharge Note     Anticipated Discharge Disposition Rehab Facility        Post-Acute Status    Post-Acute Authorization Placement     Post-Acute Placement Status Set-up Complete/Auth obtained     Discharge Delays None known at this time                   Contact Info       Powered by [facility]   Specialty: Urology    [address] [facility] [address]   Phone: [phone]       Next Steps: Schedule an appointment as soon as possible for a visit in 2 day(s)    Instructions: Return to the Emergency Room, If symptoms worsen          Jacky spoke with Isabella at [facility]; she provided number for Jacky to call report and requested that d/c orders be faxed. [facility] [place] once report is called and d/c orders are obtained.

## 2023-11-05 NOTE — DISCHARGE SUMMARY
[facility]  Hospital Medicine  Discharge Summary      Patient Name: Juan Francisco Parker  MRN: [id number]  POLLY: [id number]  Patient Class: IP- Inpatient  Admission Date: 10/24/2023  Hospital Length of Stay: 12 days  Discharge Date and Time:  11/05/2023 9:36 AM  Attending Physician: Ghanshyam Penn MD   Discharging Provider: Ghanshyam Penn MD  Primary Care Provider: Meaghan Primary Doctor    Primary Care Team: [facility] MEDICINE [facility]    HPI:   Juan Francisco Parker is a 58 y.o. male with a PMH  has a past medical history of Diabetes mellitus, type 2 (2004), Hyperlipidemia, Hypertension, Mental disability, Paranoid personality disorder, Peripheral neuropathy, and Unspecified intellectual disabilities. who presented to the ED to the ED via EMS for further evaluation of worsening altered mental status since this morning.  History mainly obtained through chart review, ED sign-out, and caregiver at bedside as patient has underlying intellectual disability and only able to provide minimal information.  Patient has history of recurrent UTIs and was complaining of belly pain over the past few days.  Caregiver unsure of patient's last bowel movement but has continued to eat pureed food and drink fluids.  Patient usually ambulates with aid of wheelchair but is able to stand and walk around but has been mostly bed-bound the past few days.  No known alleviating or aggravating factors with noted with all other review of systems negative except as stated above.  Initial workup in the ED revealed patient met SIRS criteria for sepsis with UA positive for UTI.  Patient initiated on sepsis protocol and continued on Rocephin with cultures obtained and pending.  Patient being admitted to Hospital Medicine inpatient for continued medical management.       PCP: Meaghan, Primary Doctor        * No surgery found *      Hospital Course:   Mr Parker was initially admitted with severe sepsis requiring fluid resuscitation and a leela hugger.  With  improvement in BP the fluids were stopped and with improvements in his temp the leela hugger has been weaned, requiring intermittent restarts.  On day 2 of hospitalization patient because lethargic  He appeared to be trying to cough up mucus but due to extreme weakness was not able to do so.  After aggressive suctioning he was able to have some mucus removed, but continued to have a weak cough. Pulmonology  was consulted.  The patient was started on 3% saline nebs to help with the congestion.  Chest xray Bi basilar low-grade infiltrates and/or atelectasis.  PT/OT/ST  - dysphagia diet and SNF. Urine culture Providencia rettgeri. Patient completed 7 day course of ceftriaxone. Hematology consulted due to pancytopenia. Labs ordered as requested and no significant deficiencies noted. His mental status improved. He had intermittent hypoglycemia requiring D5 infusion and dextrose tablets. Morning cortisol was low normal. Cortef was given. His blood sugar stabilized. His blood pressure medications were held due to soft blood pressure readings. [place]CP [place] [facility] seen and examined, stable for discharge.       Goals of Care Treatment Preferences:  Code Status: Full Code      Consults:   Consults (From admission, onward)        Status Ordering Provider     Inpatient consult to Hematology/Oncology  Once        Provider:  Berna Carter MD    Completed TONY MANCIA     Inpatient consult to Pulmonology  Once        Provider:  Kahlil Cotter MD    Completed TONY MANCIA          Pulmonary  Weak cough  - Appreciate pulmonary assistance  -dysphagia diet    Cardiac/Vascular  Primary hypertension  -not currently on medication  due to soft blood pressures  -home medications listed on AVS but were not given during hospitalization  -monitor blood pressure      ID  * Sepsis  -resolved  -completed 7 day course of abx    Oncology  Pancytopenia  This patient is found to have pancytopenia, the likely  etiology is mulitfactorial, will monitor CBC Daily. Will transfuse red blood cells if the hemoglobin is <7g/dL (or <8 in the setting of ACS). Will transfuse platelets if platelet count is <50k (if undergoing surgical procedure or have active bleeding). Hold DVT prophylaxis if platelets are <50k. The patient's hemoglobin, white blood cell count, and platelet count results have been reviewed and are listed below.  Recent Labs   Lab 11/05/23  0614   HGB 9.7*   WBC 2.65*          - Hematology evaluated, labs ordered as recommended  -no significant deficiencies  -platelet count improving daily        Endocrine  Hypoglycemia  -may have some degree of cortisol insuffiency  -Continue Cortef  -blood sugar stable over last 36 hours    Controlled type 2 diabetes mellitus, without long-term current use of insulin  Patient's FSGs are controlled on current medication regimen.  Last A1c reviewed-   Lab Results   Component Value Date    HGBA1C 5.1 12/10/2021     Most recent fingerstick glucose reviewed-   Recent Labs   Lab 11/04/23  1204 11/04/23  1630 11/04/23  2109 11/05/23  0721   POCTGLUCOSE 74 89 101 92     Current correctional scale  none  Maintain anti-hyperglycemic dose as follows-   Antihyperglycemics (From admission, onward)    None        Hold Oral hypoglycemics while patient is in the hospital.          Final Active Diagnoses:    Diagnosis Date Noted POA    PRINCIPAL PROBLEM:  Sepsis [A41.9] 10/24/2023 Yes    Pancytopenia [D61.818] 10/29/2023 Yes    Hypoglycemia [E16.2] 10/29/2023 Yes    Weak cough [R05.8] 10/26/2023 No    Anxiety and depression [F41.9, F32.A] 10/25/2023 Yes    Neuropathy [G62.9] 10/25/2023 Yes    History of DVT (deep vein thrombosis) [Z86.718] 10/25/2023 Not Applicable    Intellectual disability [F79] 08/25/2022 Yes    Primary hypertension [I10] 08/25/2022 Yes    Controlled type 2 diabetes mellitus, without long-term current use of insulin [E11.9] 08/25/2022 Yes    Hyperlipidemia  [E78.5] 08/25/2022 Yes    Benign prostatic hyperplasia [N40.0] 08/25/2022 Yes      Problems Resolved During this Admission:       Discharged Condition: stable    Disposition: Another Health Care Inst*    Follow Up:   Follow-up Information     [facility]. Schedule an appointment as soon as possible for a visit in 2 days.    Specialty: Urology  Why: Return to the Emergency Room, If symptoms worsen  Contact information:  [address] [facility] [id number] [phone]                     Patient Instructions:      Ambulatory referral/consult to Urology   Standing Status: Future   Referral Priority: Routine Referral Type: Consultation   Referral Reason: Specialty Services Required   Requested Specialty: Urology   Number of Visits Requested: 1     Diet Dysphagia Mechanical Soft     Follow-up primary physician   Standing Status: Future Standing Exp. Date: 11/04/24     Activity as tolerated       Significant Diagnostic Studies: Radiology:   Imaging Results    None           Pending Diagnostic Studies:     Procedure Component Value Units Date/Time    CBC auto differential [[id number]]     Order Status: Sent Lab Status: No result     Specimen: Blood     Copper, serum [[id number]] Collected: 10/31/23 0625    Order Status: Sent Lab Status: In process Updated: 10/31/23 1326    Specimen: Blood          Medications:  Reconciled Home Medications:      Medication List      START taking these medications    * hydrocortisone 5 MG Tab  Commonly known as: CORTEF  Take 1 tablet (5 mg total) by mouth every evening.     * hydrocortisone 10 MG Tab  Commonly known as: CORTEF  Take 1 tablet (10 mg total) by mouth once daily.  Start taking on: November 6, 2023         * This list has 2 medication(s) that are the same as other medications prescribed for you. Read the directions carefully, and ask your doctor or other care provider to review them with you.            CONTINUE taking these medications    amLODIPine 5  MG tablet  Commonly known as: NORVASC  Take 5 mg by mouth once daily.     atorvastatin 10 MG tablet  Commonly known as: LIPITOR  Take 10 mg by mouth once daily.     bisacodyL 5 mg EC tablet  Commonly known as: DULCOLAX  Take 5 mg by mouth daily as needed for Constipation.     cyproheptadine 4 mg tablet  Commonly known as: PERIACTIN  Take 4 mg by mouth 2 (two) times daily.     * divalproex  MG 24 hr tablet  Commonly known as: DEPAKOTE ER  Take 250 mg by mouth once daily.     * divalproex 500 MG Tb24  Take 1,000 mg by mouth nightly.     ELIQUIS 5 mg Tab  Generic drug: apixaban  Take 5 mg by mouth 2 (two) times daily.     furosemide 20 MG tablet  Commonly known as: LASIX  Take 20 mg by mouth 2 (two) times daily.     gabapentin 300 MG capsule  Commonly known as: NEURONTIN  Take 300 mg by mouth 3 (three) times daily.     JOHN 7-7-1.5 gram Pwpk  Generic drug: arginine-glutamine-calcium HMB  Take by mouth once daily.     lactulose 10 gram packet  Commonly known as: CEPHULAC  Take 10 g by mouth 3 (three) times daily.     OXcarbazepine 600 MG Tab  Commonly known as: TRILEPTAL  Take 600 mg by mouth 2 (two) times daily.     potassium chloride SA 20 MEQ tablet  Commonly known as: K-DUR,KLOR-CON  Take 20 mEq by mouth 2 (two) times daily.     QUEtiapine 400 MG tablet  Commonly known as: SEROQUEL  Take 400 mg by mouth nightly.     risperiDONE 0.5 MG Tab  Commonly known as: RISPERDAL  Take 0.5 mg by mouth 2 (two) times daily.     tamsulosin 0.4 mg Cap  Commonly known as: FLOMAX  Take 1 capsule by mouth once daily.     VITAMIN C 100 MG tablet  Generic drug: ascorbic acid (vitamin C)  Take 100 mg by mouth once daily.         * This list has 2 medication(s) that are the same as other medications prescribed for you. Read the directions carefully, and ask your doctor or other care provider to review them with you.                Indwelling Lines/Drains at time of discharge:   Lines/Drains/Airways     None                 Time  spent on the discharge of patient: 31 minutes         Ghanshyam Penn MD  Department of Hospital Medicine  [facility]

## 2023-11-05 NOTE — PLAN OF CARE
BED MOB ELTON GTO THE RIGHT WITH CG    SITTING EOB WITH GOOD DYNAMIC BALANCE    SIT<-->STAND VC FOR UPPER EXTREMITY PLACEMENT TO INCREASE HIS SAFETY    RW AMBULATED 2 X 60' WITH CG AND WITH CONVERSATION     EDUCATED ON CALL DON'T FALL PROCEDURE.     HE PARTICIPATED GOOD WITH THIS SESSION AND WAS EXCITED TO BE MOVING TO ANOTHER HOSPITAL IN THE NEAR FUTURE

## 2023-11-05 NOTE — ASSESSMENT & PLAN NOTE
This patient is found to have pancytopenia, the likely etiology is mulitfactorial, will monitor CBC Daily. Will transfuse red blood cells if the hemoglobin is <7g/dL (or <8 in the setting of ACS). Will transfuse platelets if platelet count is <50k (if undergoing surgical procedure or have active bleeding). Hold DVT prophylaxis if platelets are <50k. The patient's hemoglobin, white blood cell count, and platelet count results have been reviewed and are listed below.  Recent Labs   Lab 11/05/23  0614   HGB 9.7*   WBC 2.65*          - Hematology evaluated, labs ordered as recommended  -no significant deficiencies  -platelet count improving daily

## 2023-11-05 NOTE — PT/OT/SLP PROGRESS
Physical Therapy  Treatment    Juan Francisco Parker   MRN: [id number]   Admitting Diagnosis: Sepsis       PT Start Time: 0840     PT Stop Time: 0905    PT Total Time (min): 25 min       Billable Minutes:  Gait Training 15 and Therapeutic Activity 10    Treatment Type: Treatment  PT/PTA: PTA     Number of PTA visits since last PT visit: 3       General Precautions: Standard, fall  Orthopedic Precautions: N/A  Braces: N/A  Spiritual, Cultural Beliefs, Restoration Practices, Values that Affect Care: no    Subjective:  Communicated with NSG prior to session.      Pain/Comfort  Pain Rating 1: 0/10  Pain Rating Post-Intervention 1: 0/10      Treatment and Education:    BED MOB [place] GTO THE RIGHT WITH CG    SITTING EOB WITH GOOD DYNAMIC BALANCE    SIT<-->STAND VC FOR UPPER EXTREMITY PLACEMENT TO INCREASE HIS SAFETY    RW AMBULATED 2 X 60' WITH CG AND WITH CONVERSATION     EDUCATED ON CALL DON'T FALL PROCEDURE.     HE PARTICIPATED GOOD WITH THIS SESSION AND WAS EXCITED TO BE MOVING TO ANOTHER HOSPITAL IN THE NEAR FUTURE     AM-PAC 6 CLICK MOBILITY  How much help from another person does this patient currently need?   1 = Unable, Total/Dependent Assistance  2 = A lot, Maximum/Moderate Assistance  3 = A little, Minimum/Contact Guard/Supervision  4 = None, Modified [place]/Independent    Turning over in bed (including adjusting bedclothes, sheets and blankets)?: 3  Sitting down on and standing up from a chair with arms (e.g., wheelchair, bedside commode, etc.): 3  Moving from lying on back to sitting on the side of the bed?: 3  Moving to and from a bed to a chair (including a wheelchair)?:  (NA)  Need to walk in hospital room?: 3  Climbing 3-5 steps with a railing?: 1    AM-PAC Raw Score CMS G-Code Modifier Level of Impairment Assistance   6 % Total / Unable   7 - 9 CM 80 - 100% Maximal Assist   10 - 14 CL 60 - 80% Moderate Assist   15 - 19 CK 40 - 60% Moderate Assist   20 - 22 CJ 20 - 40% Minimal Assist   23 CI 1-20% SBA  / CGA   24 CH 0% Independent/ Mod I     Patient left up in chair with all lines intact, call button in reach, chair alarm on, AVASYS notified, and   present.    Assessment:  Juan Francisco Parker is a 58 y.o. male with a medical diagnosis of Sepsis and presents with .    Rehab identified problem list/impairments: weakness, gait instability, impaired self care skills, impaired functional mobility, decreased lower extremity function, decreased upper extremity function, decreased safety awareness    Rehab potential is fair.    Activity tolerance: Fair    Discharge recommendations: Moderate Intensity Therapy      Barriers to discharge:      Equipment recommendations: walker, rolling     GOALS:   Multidisciplinary Problems       Physical Therapy Goals          Problem: Physical Therapy    Goal Priority Disciplines Outcome Goal Variances Interventions   Physical Therapy Goal     PT, PT/OT Ongoing, Progressing     Description: Goals to be met by 11/10/23.  1. Pt will complete bed mobility MOD I.  2. Pt will complete sit to stand SBA.  3. Pt will transfer bed to chair MIN A.  4. Pt will increase AMPAC score by 2 points to progress functional mobility.                       PLAN:    Patient to be seen 3 x/week to address the above listed problems via gait training, therapeutic activities, therapeutic exercises  Plan of Care expires: 11/10/23  Plan of Care reviewed with: patient         11/05/2023

## 2023-11-05 NOTE — NURSING
[place] [facility]waiting [place] (to be [place] [facility]

## 2023-11-05 NOTE — ASSESSMENT & PLAN NOTE
Patient's FSGs are controlled on current medication regimen.  Last A1c reviewed-   Lab Results   Component Value Date    HGBA1C 5.1 12/10/2021     Most recent fingerstick glucose reviewed-   Recent Labs   Lab 11/04/23  1204 11/04/23  1630 11/04/23  2109 11/05/23  0721   POCTGLUCOSE 74 89 101 92     Current correctional scale  none  Maintain anti-hyperglycemic dose as follows-   Antihyperglycemics (From admission, onward)    None        Hold Oral hypoglycemics while patient is in the hospital.

## 2023-11-05 NOTE — ASSESSMENT & PLAN NOTE
-not currently on medication  due to soft blood pressures  -home medications listed on AVS but were not given during hospitalization  -monitor blood pressure

## 2023-11-05 NOTE — ASSESSMENT & PLAN NOTE
-may have some degree of cortisol insuffiency  -Continue Cortef  -blood sugar stable over last 36 hours

## 2023-11-05 NOTE — NURSING
Received lying in bed watching the game. Assessment per flowsheet, denies any pain or discomfort at this time. Plan of care discussed, reinforcement needed. Bed low, call light within reach. Will continue to monitor.

## 2023-11-06 LAB — COPPER SERPL-MCNC: 1000 UG/L (ref 665–1480)

## 2023-12-27 ENCOUNTER — LAB VISIT (OUTPATIENT)
Dept: LAB | Facility: HOSPITAL | Age: 58
End: 2023-12-27
Attending: NURSE PRACTITIONER
Payer: MEDICARE

## 2023-12-27 ENCOUNTER — OFFICE VISIT (OUTPATIENT)
Dept: UROLOGY | Facility: CLINIC | Age: 58
End: 2023-12-27
Payer: MEDICARE

## 2023-12-27 VITALS
BODY MASS INDEX: 20.41 KG/M2 | HEART RATE: 87 BPM | SYSTOLIC BLOOD PRESSURE: 109 MMHG | TEMPERATURE: 97 F | WEIGHT: 154 LBS | RESPIRATION RATE: 16 BRPM | DIASTOLIC BLOOD PRESSURE: 68 MMHG | HEIGHT: 73 IN

## 2023-12-27 DIAGNOSIS — Z12.5 PROSTATE CANCER SCREENING: ICD-10-CM

## 2023-12-27 DIAGNOSIS — N39.0 RECURRENT UTI: Primary | ICD-10-CM

## 2023-12-27 DIAGNOSIS — N40.0 BENIGN PROSTATIC HYPERPLASIA WITHOUT LOWER URINARY TRACT SYMPTOMS: ICD-10-CM

## 2023-12-27 PROBLEM — Z02.89 ENCOUNTER FOR COMPLETION OF FORM WITH PATIENT: Status: RESOLVED | Noted: 2022-08-25 | Resolved: 2023-12-27

## 2023-12-27 LAB
COMPLEXED PSA SERPL-MCNC: 0.4 NG/ML (ref 0–4)
POC RESIDUAL URINE VOLUME: 61 ML (ref 0–100)

## 2023-12-27 PROCEDURE — 99999PBSHW POCT BLADDER SCAN: Mod: PBBFAC,,,

## 2023-12-27 PROCEDURE — 99214 OFFICE O/P EST MOD 30 MIN: CPT | Mod: S$PBB,,, | Performed by: NURSE PRACTITIONER

## 2023-12-27 PROCEDURE — 36415 COLL VENOUS BLD VENIPUNCTURE: CPT | Performed by: NURSE PRACTITIONER

## 2023-12-27 PROCEDURE — 99214 PR OFFICE/OUTPT VISIT, EST, LEVL IV, 30-39 MIN: ICD-10-PCS | Mod: S$PBB,,, | Performed by: NURSE PRACTITIONER

## 2023-12-27 PROCEDURE — 84153 ASSAY OF PSA TOTAL: CPT | Performed by: NURSE PRACTITIONER

## 2023-12-27 PROCEDURE — 99999 PR PBB SHADOW E&M-EST. PATIENT-LVL V: CPT | Mod: PBBFAC,,, | Performed by: NURSE PRACTITIONER

## 2023-12-27 PROCEDURE — 99999 PR PBB SHADOW E&M-EST. PATIENT-LVL V: ICD-10-PCS | Mod: PBBFAC,,, | Performed by: NURSE PRACTITIONER

## 2023-12-27 PROCEDURE — 99999PBSHW POCT BLADDER SCAN: ICD-10-PCS | Mod: PBBFAC,,,

## 2023-12-27 PROCEDURE — 99215 OFFICE O/P EST HI 40 MIN: CPT | Mod: PBBFAC | Performed by: NURSE PRACTITIONER

## 2023-12-27 PROCEDURE — 51798 US URINE CAPACITY MEASURE: CPT | Mod: PBBFAC | Performed by: NURSE PRACTITIONER

## 2023-12-27 RX ORDER — METHENAMINE HIPPURATE 1000 MG/1
1 TABLET ORAL 2 TIMES DAILY
Qty: 60 TABLET | Refills: 11 | Status: SHIPPED | OUTPATIENT
Start: 2023-12-27

## 2023-12-27 RX ORDER — FLUOXETINE HYDROCHLORIDE 40 MG/1
40 CAPSULE ORAL DAILY
COMMUNITY

## 2023-12-27 RX ORDER — FINASTERIDE 5 MG/1
5 TABLET, FILM COATED ORAL DAILY
COMMUNITY

## 2023-12-27 RX ORDER — MONTELUKAST SODIUM 10 MG/1
10 TABLET ORAL DAILY
COMMUNITY

## 2023-12-27 RX ORDER — DOCUSATE SODIUM 100 MG/1
100 CAPSULE, LIQUID FILLED ORAL 2 TIMES DAILY
COMMUNITY

## 2023-12-27 RX ORDER — CLONAZEPAM 2 MG/1
0.25 TABLET ORAL NIGHTLY
COMMUNITY

## 2023-12-27 NOTE — PROGRESS NOTES
Chief Complaint:   Recurrent urinary infections    HPI:   Patient is a 58-year-old male that is presenting with caregiver, patient has MR.  Caregiver states that patient has had several positive urine cultures and history of urosepsis.  Is currently on finasteride and tamsulosin, nocturia twice nightly and daytime stream is fairly strong, pr caregiver.  Denies gross hematuria.  Urine in clinic is negative and PVR was 24 mL.  In reviewing EMR, no PSA levels seen.  No  cancers in his family per caregiver.  Patient lives in a group home.    Allergies:  Cephalexin    Medications:  has a current medication list which includes the following prescription(s): amlodipine, sophie, ascorbic acid (vitamin c), atorvastatin, bisacodyl, cyproheptadine, divalproex er, divalproex er, eliquis, furosemide, gabapentin, hydrocortisone, hydrocortisone, lactulose, oxcarbazepine, potassium chloride sa, quetiapine, risperidone, tamsulosin, clonazepam, docusate sodium, finasteride, fluoxetine, lactobacillus rhamnosus gg, methenamine, and montelukast.    Review of Systems:  General: No fever, chills, fatigability, or weight loss.  Skin: No rashes, itching, or changes in color or texture of skin.  Chest: Denies PISANO, cyanosis, wheezing, cough, and sputum production.  Abdomen: Appetite fine. No weight loss. Denies diarrhea, abdominal pain, hematemesis, or blood in stool.  Musculoskeletal: No joint stiffness or swelling. Denies back pain.  : As above.  All other review of systems negative.    PMH:   has a past medical history of Diabetes mellitus, type 2 (2004), Hyperlipidemia, Hypertension, Mental disability, Paranoid personality disorder, Peripheral neuropathy, and Unspecified intellectual disabilities.    PSH:  None    FamHx:  None    SocHx:  reports that he has quit smoking. He has never used smokeless tobacco. No history on file for alcohol use and drug use.      Physical Exam:  Vitals:    12/27/23 1524   BP: 109/68   Pulse: 87   Resp: 16    Temp: 97.1 °F (36.2 °C)     General: A&Ox3, no apparent distress, no deformities  Neck: No masses, normal thyroid  Lungs: normal inspiration, no use of accessory muscles  Heart: normal pulse, no arrhythmias  Abdomen: Soft, NT, ND, no masses, no hernias, no hepatosplenomegaly  Lymphatic: Neck and groin nodes negative  Skin: The skin is warm and dry. No jaundice.    Labs/Studies:  See HPI    Impression/Plan:   1. Prostate cancer screening  Would not tolerate TREVON, will proceed with PSA labs.    2. Recurrent urinary tract infections  Patient was prescribed Hiprex to be taken twice daily and to return to clinic in 3-4 months for re-evaluation.    3. BPH  Remain on tamsulosin and finasteride.

## 2023-12-28 ENCOUNTER — TELEPHONE (OUTPATIENT)
Dept: UROLOGY | Facility: CLINIC | Age: 58
End: 2023-12-28
Payer: MEDICARE

## 2023-12-28 PROBLEM — R05.8 WEAK COUGH: Status: RESOLVED | Noted: 2023-10-26 | Resolved: 2023-12-28

## 2023-12-28 NOTE — TELEPHONE ENCOUNTER
----- Message from Rylee Marcum NP sent at 12/28/2023  7:44 AM CST -----  Please call group home and inform patient's nurse that PSA is normal 0.40.

## 2024-02-05 PROBLEM — A41.9 SEPSIS: Status: RESOLVED | Noted: 2023-10-24 | Resolved: 2024-02-05

## 2024-03-06 ENCOUNTER — TELEPHONE (OUTPATIENT)
Dept: OPHTHALMOLOGY | Facility: CLINIC | Age: 59
End: 2024-03-06
Payer: MEDICARE

## 2024-03-06 NOTE — TELEPHONE ENCOUNTER
----- Message from Raymond Whitley sent at 3/6/2024  2:59 PM CST -----  Contact: Yulia/xavier Bender is needing a call back in regards to scheduling an eye exam for the pt please call back at [phone]